# Patient Record
Sex: MALE | Race: BLACK OR AFRICAN AMERICAN | NOT HISPANIC OR LATINO | Employment: FULL TIME | URBAN - METROPOLITAN AREA
[De-identification: names, ages, dates, MRNs, and addresses within clinical notes are randomized per-mention and may not be internally consistent; named-entity substitution may affect disease eponyms.]

---

## 2021-05-02 ENCOUNTER — HOSPITAL ENCOUNTER (EMERGENCY)
Facility: HOSPITAL | Age: 56
Discharge: HOME | End: 2021-05-02
Attending: EMERGENCY MEDICINE

## 2021-05-02 VITALS
TEMPERATURE: 98 F | DIASTOLIC BLOOD PRESSURE: 83 MMHG | HEIGHT: 70 IN | SYSTOLIC BLOOD PRESSURE: 144 MMHG | WEIGHT: 189 LBS | OXYGEN SATURATION: 99 % | HEART RATE: 58 BPM | RESPIRATION RATE: 16 BRPM | BODY MASS INDEX: 27.06 KG/M2

## 2021-05-02 DIAGNOSIS — M10.9 ACUTE GOUT OF RIGHT FOOT, UNSPECIFIED CAUSE: Primary | ICD-10-CM

## 2021-05-02 LAB
CREAT SERPL-MCNC: 1.2 MG/DL (ref 0.5–1.4)
ERYTHROCYTE [SEDIMENTATION RATE] IN BLOOD BY WESTERGREN METHOD: 9 MM/HR (ref 0–10)
SAMPLE: NORMAL
URATE SERPL-MCNC: 8.8 MG/DL (ref 3.4–7)

## 2021-05-02 PROCEDURE — 85651 RBC SED RATE NONAUTOMATED: CPT | Performed by: EMERGENCY MEDICINE

## 2021-05-02 PROCEDURE — 99284 EMERGENCY DEPT VISIT MOD MDM: CPT | Mod: 25

## 2021-05-02 PROCEDURE — 36415 COLL VENOUS BLD VENIPUNCTURE: CPT | Performed by: EMERGENCY MEDICINE

## 2021-05-02 PROCEDURE — 84550 ASSAY OF BLOOD/URIC ACID: CPT | Performed by: EMERGENCY MEDICINE

## 2021-05-02 PROCEDURE — 63600175 PHARM REV CODE 636 W HCPCS: Performed by: STUDENT IN AN ORGANIZED HEALTH CARE EDUCATION/TRAINING PROGRAM

## 2021-05-02 PROCEDURE — 96372 THER/PROPH/DIAG INJ SC/IM: CPT

## 2021-05-02 RX ORDER — KETOROLAC TROMETHAMINE 30 MG/ML
30 INJECTION, SOLUTION INTRAMUSCULAR; INTRAVENOUS
Status: COMPLETED | OUTPATIENT
Start: 2021-05-02 | End: 2021-05-02

## 2021-05-02 RX ORDER — DEXAMETHASONE SODIUM PHOSPHATE 4 MG/ML
12 INJECTION, SOLUTION INTRA-ARTICULAR; INTRALESIONAL; INTRAMUSCULAR; INTRAVENOUS; SOFT TISSUE
Status: COMPLETED | OUTPATIENT
Start: 2021-05-02 | End: 2021-05-02

## 2021-05-02 RX ADMIN — DEXAMETHASONE SODIUM PHOSPHATE 12 MG: 4 INJECTION, SOLUTION INTRA-ARTICULAR; INTRALESIONAL; INTRAMUSCULAR; INTRAVENOUS; SOFT TISSUE at 05:05

## 2021-05-02 RX ADMIN — KETOROLAC TROMETHAMINE 30 MG: 30 INJECTION, SOLUTION INTRAMUSCULAR; INTRAVENOUS at 05:05

## 2021-05-05 ENCOUNTER — TELEPHONE (OUTPATIENT)
Dept: EMERGENCY MEDICINE | Facility: HOSPITAL | Age: 56
End: 2021-05-05

## 2021-05-05 RX ORDER — PREDNISONE 20 MG/1
40 TABLET ORAL DAILY
Qty: 10 TABLET | Refills: 0 | Status: SHIPPED | OUTPATIENT
Start: 2021-05-05 | End: 2021-05-10

## 2021-08-12 ENCOUNTER — TELEPHONE (OUTPATIENT)
Dept: FAMILY MEDICINE | Facility: CLINIC | Age: 56
End: 2021-08-12

## 2021-08-12 ENCOUNTER — CLINICAL SUPPORT (OUTPATIENT)
Dept: FAMILY MEDICINE | Facility: CLINIC | Age: 56
End: 2021-08-12
Payer: OTHER GOVERNMENT

## 2021-08-12 DIAGNOSIS — U07.1 COVID-19 VIRUS INFECTION: Primary | ICD-10-CM

## 2021-08-12 DIAGNOSIS — Z20.822 EXPOSURE TO COVID-19 VIRUS: Primary | ICD-10-CM

## 2021-08-12 LAB — SARS-COV-2 RDRP RESP QL NAA+PROBE: POSITIVE

## 2021-08-12 PROCEDURE — U0002 COVID-19 LAB TEST NON-CDC: HCPCS | Performed by: FAMILY MEDICINE

## 2021-08-16 ENCOUNTER — INFUSION (OUTPATIENT)
Dept: INFECTIOUS DISEASES | Facility: HOSPITAL | Age: 56
End: 2021-08-16
Attending: PHYSICIAN ASSISTANT

## 2021-08-16 VITALS
RESPIRATION RATE: 18 BRPM | OXYGEN SATURATION: 98 % | HEART RATE: 47 BPM | DIASTOLIC BLOOD PRESSURE: 94 MMHG | TEMPERATURE: 98 F | SYSTOLIC BLOOD PRESSURE: 160 MMHG

## 2021-08-16 DIAGNOSIS — U07.1 COVID-19 VIRUS INFECTION: Primary | ICD-10-CM

## 2021-08-16 PROCEDURE — 63600175 PHARM REV CODE 636 W HCPCS: Performed by: PHYSICIAN ASSISTANT

## 2021-08-16 PROCEDURE — M0243 CASIRIVI AND IMDEVI INFUSION: HCPCS | Performed by: PHYSICIAN ASSISTANT

## 2021-08-16 PROCEDURE — 25000003 PHARM REV CODE 250: Performed by: PHYSICIAN ASSISTANT

## 2021-08-16 RX ORDER — DIPHENHYDRAMINE HYDROCHLORIDE 50 MG/ML
25 INJECTION INTRAMUSCULAR; INTRAVENOUS ONCE AS NEEDED
Status: DISCONTINUED | OUTPATIENT
Start: 2021-08-16 | End: 2022-07-30

## 2021-08-16 RX ORDER — ACETAMINOPHEN 325 MG/1
650 TABLET ORAL ONCE AS NEEDED
Status: DISPENSED | OUTPATIENT
Start: 2021-08-16 | End: 2033-01-11

## 2021-08-16 RX ORDER — ALBUTEROL SULFATE 90 UG/1
2 AEROSOL, METERED RESPIRATORY (INHALATION)
Status: DISCONTINUED | OUTPATIENT
Start: 2021-08-16 | End: 2022-09-06

## 2021-08-16 RX ORDER — EPINEPHRINE 0.3 MG/.3ML
0.3 INJECTION SUBCUTANEOUS
Status: DISCONTINUED | OUTPATIENT
Start: 2021-08-16 | End: 2022-07-30

## 2021-08-16 RX ORDER — SODIUM CHLORIDE 0.9 % (FLUSH) 0.9 %
10 SYRINGE (ML) INJECTION
Status: DISCONTINUED | OUTPATIENT
Start: 2021-08-16 | End: 2022-07-30

## 2021-08-16 RX ORDER — ONDANSETRON 4 MG/1
4 TABLET, ORALLY DISINTEGRATING ORAL ONCE AS NEEDED
Status: DISCONTINUED | OUTPATIENT
Start: 2021-08-16 | End: 2022-07-30

## 2021-08-16 RX ADMIN — CASIRIVIMAB AND IMDEVIMAB 600 MG: 600; 600 INJECTION, SOLUTION, CONCENTRATE INTRAVENOUS at 08:08

## 2021-08-16 RX ADMIN — SODIUM CHLORIDE: 0.9 INJECTION, SOLUTION INTRAVENOUS at 08:08

## 2022-05-10 ENCOUNTER — HOSPITAL ENCOUNTER (EMERGENCY)
Facility: HOSPITAL | Age: 57
Discharge: HOME | End: 2022-05-10
Attending: EMERGENCY MEDICINE
Payer: COMMERCIAL

## 2022-05-10 VITALS
DIASTOLIC BLOOD PRESSURE: 104 MMHG | SYSTOLIC BLOOD PRESSURE: 163 MMHG | TEMPERATURE: 98 F | BODY MASS INDEX: 25.05 KG/M2 | RESPIRATION RATE: 16 BRPM | HEIGHT: 70 IN | HEART RATE: 68 BPM | WEIGHT: 175 LBS | OXYGEN SATURATION: 100 %

## 2022-05-10 DIAGNOSIS — M10.9 ACUTE GOUT OF RIGHT FOOT, UNSPECIFIED CAUSE: Primary | ICD-10-CM

## 2022-05-10 PROCEDURE — 25000003 PHARM REV CODE 250: Performed by: EMERGENCY MEDICINE

## 2022-05-10 PROCEDURE — 63600175 PHARM REV CODE 636 W HCPCS: Performed by: EMERGENCY MEDICINE

## 2022-05-10 PROCEDURE — 99284 EMERGENCY DEPT VISIT MOD MDM: CPT | Mod: 25

## 2022-05-10 PROCEDURE — 96372 THER/PROPH/DIAG INJ SC/IM: CPT | Performed by: EMERGENCY MEDICINE

## 2022-05-10 RX ORDER — METOPROLOL SUCCINATE 100 MG/1
100 TABLET, EXTENDED RELEASE ORAL
Status: ON HOLD | COMMUNITY
Start: 2022-04-04 | End: 2022-07-30 | Stop reason: SDUPTHER

## 2022-05-10 RX ORDER — LOSARTAN POTASSIUM 25 MG/1
25 TABLET ORAL
Status: ON HOLD | COMMUNITY
Start: 2022-04-04 | End: 2022-07-30 | Stop reason: SDUPTHER

## 2022-05-10 RX ORDER — KETOROLAC TROMETHAMINE 10 MG/1
10 TABLET, FILM COATED ORAL
Status: COMPLETED | OUTPATIENT
Start: 2022-05-10 | End: 2022-05-10

## 2022-05-10 RX ORDER — HYDROMORPHONE HYDROCHLORIDE 2 MG/ML
1 INJECTION, SOLUTION INTRAMUSCULAR; INTRAVENOUS; SUBCUTANEOUS
Status: COMPLETED | OUTPATIENT
Start: 2022-05-10 | End: 2022-05-10

## 2022-05-10 RX ORDER — PREDNISONE 20 MG/1
40 TABLET ORAL DAILY
Qty: 10 TABLET | Refills: 0 | Status: SHIPPED | OUTPATIENT
Start: 2022-05-10 | End: 2022-05-15

## 2022-05-10 RX ORDER — PREDNISONE 20 MG/1
40 TABLET ORAL
Status: COMPLETED | OUTPATIENT
Start: 2022-05-10 | End: 2022-05-10

## 2022-05-10 RX ORDER — HYDROCODONE BITARTRATE AND ACETAMINOPHEN 10; 325 MG/1; MG/1
1 TABLET ORAL EVERY 4 HOURS PRN
Qty: 12 TABLET | Refills: 0 | Status: ON HOLD | OUTPATIENT
Start: 2022-05-10 | End: 2022-07-30

## 2022-05-10 RX ADMIN — KETOROLAC TROMETHAMINE 10 MG: 10 TABLET, FILM COATED ORAL at 02:05

## 2022-05-10 RX ADMIN — HYDROMORPHONE HYDROCHLORIDE 1 MG: 2 INJECTION INTRAMUSCULAR; INTRAVENOUS; SUBCUTANEOUS at 02:05

## 2022-05-10 RX ADMIN — PREDNISONE 40 MG: 20 TABLET ORAL at 02:05

## 2022-05-10 NOTE — ED PROVIDER NOTES
Encounter Date: 5/10/2022       History     Chief Complaint   Patient presents with    Gout     To right foot since this morning     56-year-old male history of gout history of CABG presents to the ER with right foot pain consistent with prior gout.  He has had several episodes all affecting the right foot.  He estimates that this is his 4th episode.  Last episode was about a year ago and he went to [facility].  No other affected areas.        Review of patient's allergies indicates:  No Known Allergies  Past Medical History:   Diagnosis Date    Coronary artery disease     COVID-19     Gout     Hyperlipidemia     Hypertension      Past Surgical History:   Procedure Laterality Date    CORONARY ARTERY BYPASS GRAFT       No family history on file.  Social History     Tobacco Use    Smoking status: Former Smoker   Substance Use Topics    Alcohol use: Not Currently     Review of Systems   Constitutional: Negative for fever.   Musculoskeletal: Positive for arthralgias and gait problem.   Skin: Negative for wound.       Physical Exam     Initial Vitals [05/10/22 0156]   BP Pulse Resp Temp SpO2   (!) 156/93 60 18 98 °F (36.7 °C) 99 %      MAP       --         Physical Exam    Nursing note and vitals reviewed.  Constitutional: He appears well-developed and well-nourished. He is not diaphoretic.  Non-toxic appearance. He does not have a sickly appearance. He does not appear ill. No distress.   HENT:   Head: Normocephalic and atraumatic.   Eyes: EOM are normal.   Neck: Neck supple.   Normal range of motion.  Pulmonary/Chest: No respiratory distress.   Musculoskeletal:         General: Normal range of motion.      Cervical back: Normal range of motion and neck supple. No rigidity. Normal range of motion.      Right foot: Tenderness and bony tenderness present.        Legs:       Comments: Tenderness to the 1st metatarsal shaft of the right foot     Neurological: He is alert and oriented to person, place, and  time.   Skin: Skin is warm and dry. No rash noted.   Psychiatric: He has a normal mood and affect. His behavior is normal. Judgment and thought content normal.         ED Course   Procedures  Labs Reviewed - No data to display       Imaging Results    None          Medications   HYDROmorphone (PF) injection 1 mg (1 mg Intramuscular Given 5/10/22 0245)   ketorolac tablet 10 mg (10 mg Oral Given 5/10/22 0243)   predniSONE tablet 40 mg (40 mg Oral Given 5/10/22 0244)     Medical Decision Making:   History:   Old Medical Records: I decided to obtain old medical records.             ED Course as of 05/10/22 0338   Tue May 10, 2022   0204 BP(!): 156/93 [EF]   0204 Temp: 98 °F (36.7 °C) [EF]   0204 Temp src: Oral [EF]   0204 Pulse: 60 [EF]   0204 Resp: 18 [EF]   0204 SpO2: 99 % [EF]      ED Course User Index  [EF] Jamie Alba MD             Clinical Impression:   Final diagnoses:  [M10.9] Acute gout of right foot, unspecified cause (Primary)          ED Disposition Condition    Discharge Stable        ED Prescriptions     Medication Sig Dispense Start Date End Date Auth. Provider    predniSONE (DELTASONE) 20 MG tablet Take 2 tablets (40 mg total) by mouth once daily. for 5 days 10 tablet 5/10/2022 5/15/2022 Jamie Alba MD    HYDROcodone-acetaminophen (NORCO)  mg per tablet Take 1 tablet by mouth every 4 (four) hours as needed for Pain. 12 tablet 5/10/2022  Jamie Alba MD        Follow-up Information     Follow up With Specialties Details Why Contact [facility] Emergency Medicine  As needed, If symptoms worsen [address] [place] [id number] [phone]    [facility] Podiatry Schedule an appointment as soon as possible for a visit   [address] [place] [address] [address]  [facility] [id number] [phone]          56-year-old with history of gout presents to the emergency room with right foot pain reminiscent of previous gout episodes.  Pain  well controlled in the emergency room.  He is discharged with prednisone and Norco.  Follow up with Podiatry as an outpatient.     Jamie Alba MD  05/10/22 8218

## 2022-05-10 NOTE — ED NOTES
Pt present with complaints of pain in right foot along left lateral side. No noted swelling or redness. Pt reports eating seafood on mother's day that he believes has caused his gout in that foot to start hurting. Pain level is 9/10. Pt is alert and oriented with neuro intact.

## 2022-05-11 ENCOUNTER — HOSPITAL ENCOUNTER (EMERGENCY)
Facility: HOSPITAL | Age: 57
Discharge: HOME | End: 2022-05-11
Attending: EMERGENCY MEDICINE
Payer: COMMERCIAL

## 2022-05-11 VITALS
BODY MASS INDEX: 25.05 KG/M2 | WEIGHT: 175 LBS | OXYGEN SATURATION: 97 % | RESPIRATION RATE: 17 BRPM | SYSTOLIC BLOOD PRESSURE: 133 MMHG | HEIGHT: 70 IN | HEART RATE: 58 BPM | DIASTOLIC BLOOD PRESSURE: 75 MMHG | TEMPERATURE: 98 F

## 2022-05-11 DIAGNOSIS — M10.00 ACUTE IDIOPATHIC GOUT, UNSPECIFIED SITE: Primary | ICD-10-CM

## 2022-05-11 PROCEDURE — 99284 EMERGENCY DEPT VISIT MOD MDM: CPT | Mod: 25

## 2022-05-11 PROCEDURE — 63600175 PHARM REV CODE 636 W HCPCS: Performed by: EMERGENCY MEDICINE

## 2022-05-11 PROCEDURE — 96372 THER/PROPH/DIAG INJ SC/IM: CPT | Performed by: EMERGENCY MEDICINE

## 2022-05-11 PROCEDURE — 25000003 PHARM REV CODE 250: Performed by: EMERGENCY MEDICINE

## 2022-05-11 RX ORDER — KETOROLAC TROMETHAMINE 30 MG/ML
30 INJECTION, SOLUTION INTRAMUSCULAR; INTRAVENOUS
Status: COMPLETED | OUTPATIENT
Start: 2022-05-11 | End: 2022-05-11

## 2022-05-11 RX ORDER — COLCHICINE 0.6 MG/1
1.2 TABLET, FILM COATED ORAL
Status: COMPLETED | OUTPATIENT
Start: 2022-05-11 | End: 2022-05-11

## 2022-05-11 RX ORDER — COLCHICINE 0.6 MG/1
TABLET ORAL
Qty: 30 TABLET | Refills: 0 | Status: ON HOLD | OUTPATIENT
Start: 2022-05-11 | End: 2022-07-30

## 2022-05-11 RX ORDER — INDOMETHACIN 50 MG/1
50 CAPSULE ORAL 3 TIMES DAILY PRN
Qty: 15 CAPSULE | Refills: 0 | Status: ON HOLD | OUTPATIENT
Start: 2022-05-11 | End: 2022-07-30

## 2022-05-11 RX ADMIN — COLCHICINE 1.2 MG: 0.6 TABLET, FILM COATED ORAL at 06:05

## 2022-05-11 RX ADMIN — KETOROLAC TROMETHAMINE 30 MG: 30 INJECTION, SOLUTION INTRAMUSCULAR at 06:05

## 2022-05-11 NOTE — ED NOTES
Pt identifiers checked and accurate with Irwin Richard    Pt presents to ED with complaints of foot pain x 2 days with hx of gout. Pt denies all other complaints at this time.

## 2022-05-12 NOTE — ED PROVIDER NOTES
"Encounter Date: 5/11/2022       History     Chief Complaint   Patient presents with    Gout     States dx with gout to R foot x 2 days ago -- has appt with podiatrist but "can't wait that long"     Patient is a 56-year-old male who presents emergency room with right foot pain that has been present for the past 2 days.  He is currently taking Norco and prednisone.  He has not been taking anti-inflammatories or colchicine.  He is not on allopurinol.  No other complaints at this time.  No trauma no fevers.        Review of patient's allergies indicates:  No Known Allergies  Past Medical History:   Diagnosis Date    Coronary artery disease     COVID-19     Gout     Hyperlipidemia     Hypertension      Past Surgical History:   Procedure Laterality Date    CORONARY ARTERY BYPASS GRAFT       History reviewed. No pertinent family history.  Social History     Tobacco Use    Smoking status: Former Smoker   Substance Use Topics    Alcohol use: Not Currently     Review of Systems   Constitutional: Negative for fever.   Musculoskeletal: Positive for arthralgias.   Skin: Negative for wound.   Neurological: Negative for weakness and numbness.       Physical Exam     Initial Vitals [05/11/22 1756]   BP Pulse Resp Temp SpO2   133/75 (!) 58 17 97.9 °F (36.6 °C) 97 %      MAP       --         Physical Exam    Nursing note and vitals reviewed.  Constitutional: He appears well-developed and well-nourished.   Cardiovascular: Normal rate, regular rhythm, normal heart sounds and intact distal pulses.   Abdominal: There is no abdominal tenderness.   Musculoskeletal:         General: Tenderness (Tenderness over the right great MTP) present.     Neurological: He is alert and oriented to person, place, and time. He has normal strength. No sensory deficit.         ED Course   Procedures  Labs Reviewed - No data to display       Imaging Results    None          Medications   colchicine capsule/tablet 1.2 mg (1.2 mg Oral Given 5/11/22 " 1830)   ketorolac injection 30 mg (30 mg Intramuscular Given 5/11/22 1820)     Medical Decision Making:   Patient has gout.  Stable for discharge at this time                      Clinical Impression:   Final diagnoses:  [M10.00] Acute idiopathic gout, unspecified site (Primary)          ED Disposition Condition    Discharge Stable        ED Prescriptions     Medication Sig Dispense Start Date End Date Auth. Provider    colchicine (COLCRYS) 0.6 mg tablet Take 2 tablets by mouth.  If the pain is not improving take another tablet 1 hour later.  Do not take more than 3 tablets in 1 day. 30 tablet 5/11/2022  Shubham Lu MD    indomethacin (INDOCIN) 50 MG capsule Take 1 capsule (50 mg total) by mouth 3 (three) times daily as needed. 15 capsule 5/11/2022  Shubham Lu MD        Follow-up Information     Follow up With Specialties Details Why Contact Info        Follow-up with the podiatrist           Shubham Lu MD  05/11/22 0056

## 2022-05-24 ENCOUNTER — HOSPITAL ENCOUNTER (EMERGENCY)
Facility: HOSPITAL | Age: 57
Discharge: HOME | End: 2022-05-24
Attending: EMERGENCY MEDICINE
Payer: COMMERCIAL

## 2022-05-24 VITALS
HEART RATE: 83 BPM | SYSTOLIC BLOOD PRESSURE: 113 MMHG | OXYGEN SATURATION: 96 % | DIASTOLIC BLOOD PRESSURE: 80 MMHG | RESPIRATION RATE: 18 BRPM | BODY MASS INDEX: 25.05 KG/M2 | WEIGHT: 175 LBS | HEIGHT: 70 IN | TEMPERATURE: 98 F

## 2022-05-24 DIAGNOSIS — K04.7 DENTAL ABSCESS: Primary | ICD-10-CM

## 2022-05-24 PROCEDURE — 99284 EMERGENCY DEPT VISIT MOD MDM: CPT

## 2022-05-24 PROCEDURE — 25000003 PHARM REV CODE 250: Performed by: EMERGENCY MEDICINE

## 2022-05-24 RX ORDER — AMOXICILLIN AND CLAVULANATE POTASSIUM 875; 125 MG/1; MG/1
1 TABLET, FILM COATED ORAL
Status: COMPLETED | OUTPATIENT
Start: 2022-05-24 | End: 2022-05-24

## 2022-05-24 RX ORDER — AMOXICILLIN AND CLAVULANATE POTASSIUM 875; 125 MG/1; MG/1
1 TABLET, FILM COATED ORAL 2 TIMES DAILY
Qty: 14 TABLET | Refills: 0 | Status: ON HOLD | OUTPATIENT
Start: 2022-05-24 | End: 2022-07-30

## 2022-05-24 RX ORDER — IBUPROFEN 600 MG/1
600 TABLET ORAL EVERY 6 HOURS PRN
Qty: 20 TABLET | Refills: 0 | Status: SHIPPED | OUTPATIENT
Start: 2022-05-24 | End: 2022-12-08

## 2022-05-24 RX ORDER — IBUPROFEN 600 MG/1
600 TABLET ORAL
Status: COMPLETED | OUTPATIENT
Start: 2022-05-24 | End: 2022-05-24

## 2022-05-24 RX ADMIN — AMOXICILLIN AND CLAVULANATE POTASSIUM 1 TABLET: 875; 125 TABLET, FILM COATED ORAL at 10:05

## 2022-05-24 RX ADMIN — IBUPROFEN 600 MG: 600 TABLET ORAL at 10:05

## 2022-05-24 NOTE — ED PROVIDER NOTES
Encounter Date: 5/24/2022    SCRIBE #1 NOTE: I, Emily Snyder, ayana scribing for, and in the presence of, Kareem Galaviz III, MD.       History     Chief Complaint   Patient presents with    Dental Problem     Time seen by provider: 10:41 AM on 05/24/2022    Irwin Richard is a 56 y.o. male who presents to the ED for dental problem. Patient reports right lower tooth fracture 2 days ago with associated pain. He went to [facility] this morning and states he was advised to see a medical provider first prompting ED visit today. The patient denies any other symptoms at this time. PMHx of HTN, CAD, and HLD. PSHx of CABG.    The history is provided by the patient.     Review of patient's allergies indicates:  No Known Allergies  Past Medical History:   Diagnosis Date    Coronary artery disease     COVID-19     Gout     Hyperlipidemia     Hypertension      Past Surgical History:   Procedure Laterality Date    CORONARY ARTERY BYPASS GRAFT       No family history on file.  Social History     Tobacco Use    Smoking status: Former Smoker   Substance Use Topics    Alcohol use: Not Currently     Review of Systems   Constitutional: Negative for fever.   HENT: Positive for dental problem.    Respiratory: Negative for shortness of breath.    Genitourinary: Negative for flank pain.   Musculoskeletal: Negative for gait problem.   Neurological: Negative for weakness.   Psychiatric/Behavioral: Negative for confusion.       Physical Exam     Initial Vitals [05/24/22 1036]   BP Pulse Resp Temp SpO2   113/80 83 18 98 °F (36.7 °C) 96 %      MAP       --         Physical Exam    Nursing note and vitals reviewed.  Constitutional: He appears well-developed and well-nourished.   HENT:   Head: Normocephalic and atraumatic.   Mouth/Throat: Uvula is midline and oropharynx is clear and moist. No trismus in the jaw. Dental caries present. No oropharyngeal exudate, posterior oropharyngeal edema or posterior oropharyngeal erythema.    Large dental gómez. No gingival swelling. Right mandibular swelling with associated tenderness.    Eyes: Conjunctivae are normal.   Neck: Neck supple. No stridor present.   Normal range of motion.  Cardiovascular: Normal rate, regular rhythm and normal heart sounds. Exam reveals no gallop and no friction rub.    No murmur heard.  Pulmonary/Chest: Breath sounds normal. No respiratory distress. He has no wheezes. He has no rhonchi. He has no rales.   Musculoskeletal:         General: Normal range of motion.      Cervical back: Normal range of motion and neck supple.     Neurological: He is alert and oriented to person, place, and time.   Skin: Skin is warm and dry.   Psychiatric: He has a normal mood and affect.         ED Course   Procedures  Labs Reviewed - No data to display       Imaging Results    None          Medications   amoxicillin-clavulanate 875-125mg per tablet 1 tablet (1 tablet Oral Given 5/24/22 1050)   ibuprofen tablet 600 mg (600 mg Oral Given 5/24/22 1050)     Medical Decision Making:   History:   Old Medical Records: I decided to obtain old medical records.  ED Management:  56-year-old male presents with dental pain with mandibular swelling.  There is no visible drainable abscess.  The symptoms suggest a dental abscess.  He is placed on Augmentin and referred to a dentist.       APC / Resident Notes:   I, Dr. Kareem Galaviz III, personally performed the services described in this documentation. All medical record entries made by the scribe were at my direction and in my presence.  I have reviewed the chart and agree that the record reflects my personal performance and is accurate and complete     Scribe Attestation:   Scribe #1: I performed the above scribed service and the documentation accurately describes the services I performed. I attest to the accuracy of the note.                 Clinical Impression:   Final diagnoses:  [K04.7] Dental abscess (Primary)          ED Disposition Condition     Discharge Stable        ED Prescriptions     Medication Sig Dispense Start Date End Date Auth. Provider    amoxicillin-clavulanate 875-125mg (AUGMENTIN) 875-125 mg per tablet Take 1 tablet by mouth 2 (two) times daily. 14 tablet 5/24/2022  Kareem Galaviz III, MD    ibuprofen (ADVIL,MOTRIN) 600 MG tablet Take 1 tablet (600 mg total) by mouth every 6 (six) hours as needed for Pain. 20 tablet 5/24/2022  Kareem Galaviz III, MD        Follow-up Information     Follow up With Specialties Details Why Contact Info    [facility]  In 1 day  [facility]  [address] [facility] ANGELA Manning III, MD  05/24/22 5399

## 2022-05-24 NOTE — ED NOTES
Assumed care:  Irwin Richard is awake, alert and oriented x 3, skin warm and dry, in NAD.  Patient CO left lower tooth pain.    Patient identifiers for Irwin Richard checked and correct.  LOC:  Irwin Richard is awake, alert, and aware of environment with an appropriate affect. He is oriented x 3 and speaking appropriately.  APPEARANCE:  He is resting comfortably and in no acute distress. He is clean and well groomed, patient's clothing is properly fastened.  SKIN:  The skin is warm and dry. He has normal skin turgor and moist mucus membranes. Skin is intact; no bruising or breakdown noted.  MUSCULOSKELETAL:  He is moving all extremities well, no obvious deformities noted. Pulses intact.   RESPIRATORY:  Airway is open and patent. Respirations are spontaneous and non-labored with normal effort and rate.  CARDIAC:  He has a normal rate and rhythm. No peripheral edema noted. Capillary refill < 3 seconds.  ABDOMEN:  No distention noted.  Soft and non-tender upon palpation.  NEUROLOGICAL:  PERRL. Facial expression is symmetrical. Hand grasps are equal bilaterally. Normal sensation in all extremities when touched with finger.  Allergies reported:  Review of patient's allergies indicates:  No Known Allergies  OTHER NOTES:

## 2022-07-28 ENCOUNTER — HOSPITAL ENCOUNTER (INPATIENT)
Facility: HOSPITAL | Age: 57
LOS: 1 days | Discharge: HOME | DRG: 554 | End: 2022-07-30
Attending: EMERGENCY MEDICINE | Admitting: INTERNAL MEDICINE
Payer: COMMERCIAL

## 2022-07-28 DIAGNOSIS — R07.9 CHEST PAIN: ICD-10-CM

## 2022-07-28 DIAGNOSIS — I50.9 NEW ONSET OF CONGESTIVE HEART FAILURE: ICD-10-CM

## 2022-07-28 DIAGNOSIS — R07.89 CHEST TIGHTNESS: ICD-10-CM

## 2022-07-28 DIAGNOSIS — M1A.0710 IDIOPATHIC CHRONIC GOUT OF RIGHT FOOT WITHOUT TOPHUS: Primary | ICD-10-CM

## 2022-07-28 PROBLEM — Z95.1 HISTORY OF CORONARY ARTERY BYPASS GRAFT: Status: ACTIVE | Noted: 2020-01-15

## 2022-07-28 PROBLEM — I25.10 CAD (CORONARY ARTERY DISEASE): Status: ACTIVE | Noted: 2022-07-28

## 2022-07-28 PROBLEM — G89.29 CHRONIC BILATERAL LOW BACK PAIN WITHOUT SCIATICA: Status: ACTIVE | Noted: 2020-01-15

## 2022-07-28 PROBLEM — M10.9 GOUT OF FOOT: Status: ACTIVE | Noted: 2021-04-05

## 2022-07-28 PROBLEM — M54.50 CHRONIC BILATERAL LOW BACK PAIN WITHOUT SCIATICA: Status: ACTIVE | Noted: 2020-01-15

## 2022-07-28 PROBLEM — E78.5 HLD (HYPERLIPIDEMIA): Status: ACTIVE | Noted: 2022-07-28

## 2022-07-28 PROBLEM — I10 HTN (HYPERTENSION): Status: ACTIVE | Noted: 2022-07-28

## 2022-07-28 LAB
ALBUMIN SERPL BCP-MCNC: 3.9 G/DL (ref 3.5–5.2)
ALP SERPL-CCNC: 73 U/L (ref 55–135)
ALT SERPL W/O P-5'-P-CCNC: 12 U/L (ref 10–44)
ANION GAP SERPL CALC-SCNC: 9 MMOL/L (ref 8–16)
AST SERPL-CCNC: 15 U/L (ref 10–40)
BASOPHILS # BLD AUTO: 0.02 K/UL (ref 0–0.2)
BASOPHILS NFR BLD: 0.3 % (ref 0–1.9)
BILIRUB SERPL-MCNC: 0.4 MG/DL (ref 0.1–1)
BNP SERPL-MCNC: 126 PG/ML (ref 0–99)
BUN SERPL-MCNC: 12 MG/DL (ref 6–20)
CALCIUM SERPL-MCNC: 9.3 MG/DL (ref 8.7–10.5)
CHLORIDE SERPL-SCNC: 107 MMOL/L (ref 95–110)
CO2 SERPL-SCNC: 26 MMOL/L (ref 23–29)
CREAT SERPL-MCNC: 1 MG/DL (ref 0.5–1.4)
DIFFERENTIAL METHOD: ABNORMAL
EOSINOPHIL # BLD AUTO: 0.2 K/UL (ref 0–0.5)
EOSINOPHIL NFR BLD: 2.6 % (ref 0–8)
ERYTHROCYTE [DISTWIDTH] IN BLOOD BY AUTOMATED COUNT: 11.8 % (ref 11.5–14.5)
EST. GFR  (AFRICAN AMERICAN): >60 ML/MIN/1.73 M^2
EST. GFR  (NON AFRICAN AMERICAN): >60 ML/MIN/1.73 M^2
GLUCOSE SERPL-MCNC: 74 MG/DL (ref 70–110)
HCT VFR BLD AUTO: 40.5 % (ref 40–54)
HGB BLD-MCNC: 13.5 G/DL (ref 14–18)
IMM GRANULOCYTES # BLD AUTO: 0.03 K/UL (ref 0–0.04)
IMM GRANULOCYTES NFR BLD AUTO: 0.4 % (ref 0–0.5)
LYMPHOCYTES # BLD AUTO: 3.4 K/UL (ref 1–4.8)
LYMPHOCYTES NFR BLD: 42.2 % (ref 18–48)
MCH RBC QN AUTO: 30.5 PG (ref 27–31)
MCHC RBC AUTO-ENTMCNC: 33.3 G/DL (ref 32–36)
MCV RBC AUTO: 91 FL (ref 82–98)
MONOCYTES # BLD AUTO: 0.7 K/UL (ref 0.3–1)
MONOCYTES NFR BLD: 9.2 % (ref 4–15)
NEUTROPHILS # BLD AUTO: 3.6 K/UL (ref 1.8–7.7)
NEUTROPHILS NFR BLD: 45.3 % (ref 38–73)
NRBC BLD-RTO: 0 /100 WBC
PLATELET # BLD AUTO: 168 K/UL (ref 150–450)
PMV BLD AUTO: 10.6 FL (ref 9.2–12.9)
POTASSIUM SERPL-SCNC: 3.7 MMOL/L (ref 3.5–5.1)
PROT SERPL-MCNC: 7.4 G/DL (ref 6–8.4)
RBC # BLD AUTO: 4.43 M/UL (ref 4.6–6.2)
SODIUM SERPL-SCNC: 142 MMOL/L (ref 136–145)
TROPONIN I SERPL DL<=0.01 NG/ML-MCNC: <0.006 NG/ML (ref 0–0.03)
WBC # BLD AUTO: 7.96 K/UL (ref 3.9–12.7)

## 2022-07-28 PROCEDURE — 93010 ELECTROCARDIOGRAM REPORT: CPT | Mod: ,,, | Performed by: SPECIALIST

## 2022-07-28 PROCEDURE — 93005 ELECTROCARDIOGRAM TRACING: CPT

## 2022-07-28 PROCEDURE — 96374 THER/PROPH/DIAG INJ IV PUSH: CPT

## 2022-07-28 PROCEDURE — 96376 TX/PRO/DX INJ SAME DRUG ADON: CPT

## 2022-07-28 PROCEDURE — 25000003 PHARM REV CODE 250: Performed by: EMERGENCY MEDICINE

## 2022-07-28 PROCEDURE — 96361 HYDRATE IV INFUSION ADD-ON: CPT

## 2022-07-28 PROCEDURE — 93010 EKG 12-LEAD: ICD-10-PCS | Mod: ,,, | Performed by: SPECIALIST

## 2022-07-28 PROCEDURE — 36415 COLL VENOUS BLD VENIPUNCTURE: CPT | Performed by: EMERGENCY MEDICINE

## 2022-07-28 PROCEDURE — 83880 ASSAY OF NATRIURETIC PEPTIDE: CPT | Performed by: EMERGENCY MEDICINE

## 2022-07-28 PROCEDURE — 85025 COMPLETE CBC W/AUTO DIFF WBC: CPT | Performed by: EMERGENCY MEDICINE

## 2022-07-28 PROCEDURE — 96375 TX/PRO/DX INJ NEW DRUG ADDON: CPT

## 2022-07-28 PROCEDURE — 80053 COMPREHEN METABOLIC PANEL: CPT | Performed by: EMERGENCY MEDICINE

## 2022-07-28 PROCEDURE — 99285 EMERGENCY DEPT VISIT HI MDM: CPT | Mod: 25

## 2022-07-28 PROCEDURE — 84484 ASSAY OF TROPONIN QUANT: CPT | Mod: 91 | Performed by: EMERGENCY MEDICINE

## 2022-07-28 PROCEDURE — 63600175 PHARM REV CODE 636 W HCPCS: Performed by: EMERGENCY MEDICINE

## 2022-07-28 RX ORDER — MORPHINE SULFATE 4 MG/ML
8 INJECTION, SOLUTION INTRAMUSCULAR; INTRAVENOUS
Status: COMPLETED | OUTPATIENT
Start: 2022-07-28 | End: 2022-07-28

## 2022-07-28 RX ORDER — KETOROLAC TROMETHAMINE 30 MG/ML
15 INJECTION, SOLUTION INTRAMUSCULAR; INTRAVENOUS
Status: COMPLETED | OUTPATIENT
Start: 2022-07-28 | End: 2022-07-28

## 2022-07-28 RX ORDER — ASPIRIN 325 MG
325 TABLET ORAL
Status: COMPLETED | OUTPATIENT
Start: 2022-07-28 | End: 2022-07-28

## 2022-07-28 RX ORDER — LOSARTAN POTASSIUM 25 MG/1
25 TABLET ORAL DAILY
Status: ON HOLD | COMMUNITY
Start: 2022-05-25 | End: 2022-07-30 | Stop reason: SDUPTHER

## 2022-07-28 RX ORDER — METOPROLOL SUCCINATE 100 MG/1
100 TABLET, EXTENDED RELEASE ORAL DAILY
Status: ON HOLD | COMMUNITY
Start: 2022-05-25 | End: 2022-07-30 | Stop reason: SDUPTHER

## 2022-07-28 RX ADMIN — MORPHINE SULFATE 8 MG: 4 INJECTION INTRAVENOUS at 08:07

## 2022-07-28 RX ADMIN — KETOROLAC TROMETHAMINE 15 MG: 30 INJECTION, SOLUTION INTRAMUSCULAR; INTRAVENOUS at 11:07

## 2022-07-28 RX ADMIN — MORPHINE SULFATE 8 MG: 4 INJECTION INTRAVENOUS at 11:07

## 2022-07-28 RX ADMIN — ASPIRIN 325 MG ORAL TABLET 325 MG: 325 PILL ORAL at 08:07

## 2022-07-28 RX ADMIN — SODIUM CHLORIDE 500 ML: 0.9 INJECTION, SOLUTION INTRAVENOUS at 08:07

## 2022-07-28 NOTE — Clinical Note
Diagnosis: New onset of congestive heart failure [[id number]]   Future Attending Provider: NAMAN MATA [[id number]]   Admitting Provider:: NAMAN MATA [[id number]]   Special Needs:: No Special Needs [1]

## 2022-07-29 PROBLEM — I50.9 NEW ONSET OF CONGESTIVE HEART FAILURE: Status: ACTIVE | Noted: 2022-07-29

## 2022-07-29 LAB
AORTIC ROOT ANNULUS: 2.68 CM
AORTIC VALVE CUSP SEPERATION: 2.08 CM
ASCENDING AORTA: 2.55 CM
AV INDEX (PROSTH): 0.87
AV MEAN GRADIENT: 2 MMHG
AV PEAK GRADIENT: 4 MMHG
AV VALVE AREA: 3.01 CM2
AV VELOCITY RATIO: 0.81
BASOPHILS # BLD AUTO: 0.04 K/UL (ref 0–0.2)
BASOPHILS NFR BLD: 0.6 % (ref 0–1.9)
BSA FOR ECHO PROCEDURE: 2.01 M2
CHOLEST SERPL-MCNC: 202 MG/DL (ref 120–199)
CHOLEST/HDLC SERPL: 3.9 {RATIO} (ref 2–5)
CV ECHO LV RWT: 0.44 CM
CV STRESS BASE HR: 60 BPM
DIASTOLIC BLOOD PRESSURE: 76 MMHG
DIFFERENTIAL METHOD: ABNORMAL
DOP CALC AO PEAK VEL: 0.97 M/S
DOP CALC AO VTI: 16.47 CM
DOP CALC LVOT AREA: 3.5 CM2
DOP CALC LVOT DIAMETER: 2.1 CM
DOP CALC LVOT PEAK VEL: 0.79 M/S
DOP CALC LVOT STROKE VOLUME: 49.57 CM3
DOP CALC MV VTI: 20.93 CM
DOP CALCLVOT PEAK VEL VTI: 14.32 CM
E WAVE DECELERATION TIME: 301.53 MSEC
E/A RATIO: 0.5
E/E' RATIO: 4.4 M/S
ECHO LV POSTERIOR WALL: 0.95 CM (ref 0.6–1.1)
EJECTION FRACTION: 50 %
EOSINOPHIL # BLD AUTO: 0.3 K/UL (ref 0–0.5)
EOSINOPHIL NFR BLD: 3.7 % (ref 0–8)
ERYTHROCYTE [DISTWIDTH] IN BLOOD BY AUTOMATED COUNT: 11.7 % (ref 11.5–14.5)
ESTIMATED AVG GLUCOSE: 117 MG/DL (ref 68–131)
FRACTIONAL SHORTENING: 25 % (ref 28–44)
HBA1C MFR BLD: 5.7 % (ref 4–5.6)
HCT VFR BLD AUTO: 41.7 % (ref 40–54)
HDLC SERPL-MCNC: 52 MG/DL (ref 40–75)
HDLC SERPL: 25.7 % (ref 20–50)
HGB BLD-MCNC: 14 G/DL (ref 14–18)
IMM GRANULOCYTES # BLD AUTO: 0.02 K/UL (ref 0–0.04)
IMM GRANULOCYTES NFR BLD AUTO: 0.3 % (ref 0–0.5)
INTERVENTRICULAR SEPTUM: 1 CM (ref 0.6–1.1)
IVRT: 114.18 MSEC
LA MAJOR: 4.23 CM
LA MINOR: 3.91 CM
LA WIDTH: 2.82 CM
LDLC SERPL CALC-MCNC: 134.6 MG/DL (ref 63–159)
LEFT ATRIUM SIZE: 3.26 CM
LEFT ATRIUM VOLUME INDEX MOD: 14 ML/M2
LEFT ATRIUM VOLUME INDEX: 15.9 ML/M2
LEFT ATRIUM VOLUME MOD: 28.09 CM3
LEFT ATRIUM VOLUME: 31.75 CM3
LEFT INTERNAL DIMENSION IN SYSTOLE: 3.2 CM (ref 2.1–4)
LEFT VENTRICLE DIASTOLIC VOLUME INDEX: 40.91 ML/M2
LEFT VENTRICLE DIASTOLIC VOLUME: 81.82 ML
LEFT VENTRICLE MASS INDEX: 68 G/M2
LEFT VENTRICLE SYSTOLIC VOLUME INDEX: 20.5 ML/M2
LEFT VENTRICLE SYSTOLIC VOLUME: 40.96 ML
LEFT VENTRICULAR INTERNAL DIMENSION IN DIASTOLE: 4.27 CM (ref 3.5–6)
LEFT VENTRICULAR MASS: 136.04 G
LV LATERAL E/E' RATIO: 3.67 M/S
LV SEPTAL E/E' RATIO: 5.5 M/S
LYMPHOCYTES # BLD AUTO: 2.9 K/UL (ref 1–4.8)
LYMPHOCYTES NFR BLD: 40.1 % (ref 18–48)
MAGNESIUM SERPL-MCNC: 2.1 MG/DL (ref 1.6–2.6)
MCH RBC QN AUTO: 30.6 PG (ref 27–31)
MCHC RBC AUTO-ENTMCNC: 33.6 G/DL (ref 32–36)
MCV RBC AUTO: 91 FL (ref 82–98)
MONOCYTES # BLD AUTO: 0.7 K/UL (ref 0.3–1)
MONOCYTES NFR BLD: 9.2 % (ref 4–15)
MV A" WAVE DURATION": 10.28 MSEC
MV MEAN GRADIENT: 0 MMHG
MV PEAK A VEL: 0.66 M/S
MV PEAK E VEL: 0.33 M/S
MV PEAK GRADIENT: 3 MMHG
MV STENOSIS PRESSURE HALF TIME: 87.44 MS
MV VALVE AREA BY CONTINUITY EQUATION: 2.37 CM2
MV VALVE AREA P 1/2 METHOD: 2.52 CM2
NEUTROPHILS # BLD AUTO: 3.3 K/UL (ref 1.8–7.7)
NEUTROPHILS NFR BLD: 46.1 % (ref 38–73)
NONHDLC SERPL-MCNC: 150 MG/DL
NRBC BLD-RTO: 0 /100 WBC
OHS CV CPX 85 PERCENT MAX PREDICTED HEART RATE MALE: 139
OHS CV CPX MAX PREDICTED HEART RATE: 164
OHS CV CPX PATIENT IS FEMALE: 0
OHS CV CPX PATIENT IS MALE: 1
OHS CV CPX PEAK DIASTOLIC BLOOD PRESSURE: 94 MMHG
OHS CV CPX PEAK HEAR RATE: 83 BPM
OHS CV CPX PEAK RATE PRESSURE PRODUCT: NORMAL
OHS CV CPX PEAK SYSTOLIC BLOOD PRESSURE: 135 MMHG
OHS CV CPX PERCENT MAX PREDICTED HEART RATE ACHIEVED: 51
OHS CV CPX RATE PRESSURE PRODUCT PRESENTING: 6660
PHOSPHATE SERPL-MCNC: 3.5 MG/DL (ref 2.7–4.5)
PISA MRMAX VEL: 0.03 M/S
PISA TR MAX VEL: 2.28 M/S
PLATELET # BLD AUTO: 155 K/UL (ref 150–450)
PMV BLD AUTO: 10 FL (ref 9.2–12.9)
PULM VEIN S/D RATIO: 0.81
PV PEAK D VEL: 0.43 M/S
PV PEAK S VEL: 0.35 M/S
PV PEAK VELOCITY: 0.66 CM/S
RA MAJOR: 3.35 CM
RA PRESSURE: 3 MMHG
RA WIDTH: 2.17 CM
RBC # BLD AUTO: 4.58 M/UL (ref 4.6–6.2)
RIGHT VENTRICULAR END-DIASTOLIC DIMENSION: 2.62 CM
RV TISSUE DOPPLER FREE WALL SYSTOLIC VELOCITY 1 (APICAL 4 CHAMBER VIEW): 8.8 CM/S
SINUS: 2.62 CM
STJ: 2.51 CM
SYSTOLIC BLOOD PRESSURE: 111 MMHG
TDI LATERAL: 0.09 M/S
TDI SEPTAL: 0.06 M/S
TDI: 0.08 M/S
TR MAX PG: 21 MMHG
TRICUSPID ANNULAR PLANE SYSTOLIC EXCURSION: 1.99 CM
TRIGL SERPL-MCNC: 77 MG/DL (ref 30–150)
TROPONIN I SERPL DL<=0.01 NG/ML-MCNC: 0.01 NG/ML (ref 0–0.03)
TROPONIN I SERPL DL<=0.01 NG/ML-MCNC: <0.006 NG/ML (ref 0–0.03)
TSH SERPL DL<=0.005 MIU/L-ACNC: 3.81 UIU/ML (ref 0.4–4)
TV REST PULMONARY ARTERY PRESSURE: 24 MMHG
URATE SERPL-MCNC: 11.3 MG/DL (ref 3.4–7)
WBC # BLD AUTO: 7.1 K/UL (ref 3.9–12.7)

## 2022-07-29 PROCEDURE — 85025 COMPLETE CBC W/AUTO DIFF WBC: CPT | Performed by: NURSE PRACTITIONER

## 2022-07-29 PROCEDURE — 25000003 PHARM REV CODE 250: Performed by: NURSE PRACTITIONER

## 2022-07-29 PROCEDURE — 99223 1ST HOSP IP/OBS HIGH 75: CPT | Mod: 25,,, | Performed by: SPECIALIST

## 2022-07-29 PROCEDURE — 99223 PR INITIAL HOSPITAL CARE,LEVL III: ICD-10-PCS | Mod: 25,,, | Performed by: SPECIALIST

## 2022-07-29 PROCEDURE — 83735 ASSAY OF MAGNESIUM: CPT | Performed by: NURSE PRACTITIONER

## 2022-07-29 PROCEDURE — 63600175 PHARM REV CODE 636 W HCPCS: Performed by: EMERGENCY MEDICINE

## 2022-07-29 PROCEDURE — 63600175 PHARM REV CODE 636 W HCPCS: Performed by: NURSE PRACTITIONER

## 2022-07-29 PROCEDURE — 84100 ASSAY OF PHOSPHORUS: CPT | Performed by: NURSE PRACTITIONER

## 2022-07-29 PROCEDURE — 96375 TX/PRO/DX INJ NEW DRUG ADDON: CPT

## 2022-07-29 PROCEDURE — 36415 COLL VENOUS BLD VENIPUNCTURE: CPT | Performed by: SPECIALIST

## 2022-07-29 PROCEDURE — 63600175 PHARM REV CODE 636 W HCPCS: Performed by: SPECIALIST

## 2022-07-29 PROCEDURE — 36415 COLL VENOUS BLD VENIPUNCTURE: CPT | Performed by: NURSE PRACTITIONER

## 2022-07-29 PROCEDURE — 83036 HEMOGLOBIN GLYCOSYLATED A1C: CPT | Performed by: NURSE PRACTITIONER

## 2022-07-29 PROCEDURE — 84484 ASSAY OF TROPONIN QUANT: CPT | Performed by: NURSE PRACTITIONER

## 2022-07-29 PROCEDURE — 84443 ASSAY THYROID STIM HORMONE: CPT | Performed by: NURSE PRACTITIONER

## 2022-07-29 PROCEDURE — 84550 ASSAY OF BLOOD/URIC ACID: CPT | Performed by: SPECIALIST

## 2022-07-29 PROCEDURE — 80061 LIPID PANEL: CPT | Performed by: NURSE PRACTITIONER

## 2022-07-29 PROCEDURE — 12000002 HC ACUTE/MED SURGE SEMI-PRIVATE ROOM

## 2022-07-29 PROCEDURE — 96376 TX/PRO/DX INJ SAME DRUG ADON: CPT

## 2022-07-29 RX ORDER — FUROSEMIDE 10 MG/ML
60 INJECTION INTRAMUSCULAR; INTRAVENOUS
Status: COMPLETED | OUTPATIENT
Start: 2022-07-29 | End: 2022-07-29

## 2022-07-29 RX ORDER — ATORVASTATIN CALCIUM 40 MG/1
40 TABLET, FILM COATED ORAL DAILY
Status: DISCONTINUED | OUTPATIENT
Start: 2022-07-29 | End: 2022-07-30 | Stop reason: HOSPADM

## 2022-07-29 RX ORDER — SODIUM CHLORIDE 0.9 % (FLUSH) 0.9 %
10 SYRINGE (ML) INJECTION
Status: DISCONTINUED | OUTPATIENT
Start: 2022-07-29 | End: 2022-07-30 | Stop reason: HOSPADM

## 2022-07-29 RX ORDER — MORPHINE SULFATE 2 MG/ML
6 INJECTION, SOLUTION INTRAMUSCULAR; INTRAVENOUS EVERY 4 HOURS PRN
Status: DISCONTINUED | OUTPATIENT
Start: 2022-07-29 | End: 2022-07-30

## 2022-07-29 RX ORDER — COLCHICINE 0.6 MG/1
0.6 TABLET, FILM COATED ORAL DAILY
Status: DISCONTINUED | OUTPATIENT
Start: 2022-07-30 | End: 2022-07-30 | Stop reason: HOSPADM

## 2022-07-29 RX ORDER — COLCHICINE 0.6 MG/1
1.2 TABLET, FILM COATED ORAL ONCE
Status: COMPLETED | OUTPATIENT
Start: 2022-07-29 | End: 2022-07-29

## 2022-07-29 RX ORDER — METOPROLOL SUCCINATE 50 MG/1
100 TABLET, EXTENDED RELEASE ORAL DAILY
Status: DISCONTINUED | OUTPATIENT
Start: 2022-07-29 | End: 2022-07-30 | Stop reason: HOSPADM

## 2022-07-29 RX ORDER — ENOXAPARIN SODIUM 100 MG/ML
80 INJECTION SUBCUTANEOUS
Status: DISCONTINUED | OUTPATIENT
Start: 2022-07-29 | End: 2022-07-30 | Stop reason: HOSPADM

## 2022-07-29 RX ORDER — MORPHINE SULFATE 4 MG/ML
4 INJECTION, SOLUTION INTRAMUSCULAR; INTRAVENOUS EVERY 4 HOURS PRN
Status: DISCONTINUED | OUTPATIENT
Start: 2022-07-29 | End: 2022-07-30

## 2022-07-29 RX ORDER — REGADENOSON 0.08 MG/ML
0.4 INJECTION, SOLUTION INTRAVENOUS ONCE
Status: COMPLETED | OUTPATIENT
Start: 2022-07-29 | End: 2022-07-29

## 2022-07-29 RX ORDER — LOSARTAN POTASSIUM 25 MG/1
25 TABLET ORAL DAILY
Status: DISCONTINUED | OUTPATIENT
Start: 2022-07-29 | End: 2022-07-30 | Stop reason: HOSPADM

## 2022-07-29 RX ORDER — NAPROXEN SODIUM 220 MG/1
81 TABLET, FILM COATED ORAL DAILY
Status: DISCONTINUED | OUTPATIENT
Start: 2022-07-29 | End: 2022-07-30 | Stop reason: HOSPADM

## 2022-07-29 RX ORDER — FUROSEMIDE 10 MG/ML
40 INJECTION INTRAMUSCULAR; INTRAVENOUS DAILY
Status: DISCONTINUED | OUTPATIENT
Start: 2022-07-29 | End: 2022-07-30 | Stop reason: HOSPADM

## 2022-07-29 RX ORDER — ENOXAPARIN SODIUM 100 MG/ML
40 INJECTION SUBCUTANEOUS EVERY 24 HOURS
Status: DISCONTINUED | OUTPATIENT
Start: 2022-07-29 | End: 2022-07-29

## 2022-07-29 RX ADMIN — FUROSEMIDE 60 MG: 10 INJECTION, SOLUTION INTRAMUSCULAR; INTRAVENOUS at 12:07

## 2022-07-29 RX ADMIN — COLCHICINE 1.2 MG: 0.6 TABLET, FILM COATED ORAL at 02:07

## 2022-07-29 RX ADMIN — MORPHINE SULFATE 6 MG: 2 INJECTION, SOLUTION INTRAMUSCULAR; INTRAVENOUS at 11:07

## 2022-07-29 RX ADMIN — FUROSEMIDE 40 MG: 10 INJECTION, SOLUTION INTRAMUSCULAR; INTRAVENOUS at 11:07

## 2022-07-29 RX ADMIN — LOSARTAN POTASSIUM 25 MG: 25 TABLET, FILM COATED ORAL at 11:07

## 2022-07-29 RX ADMIN — NITROGLYCERIN 0.5 INCH: 20 OINTMENT TOPICAL at 11:07

## 2022-07-29 RX ADMIN — ASPIRIN 81 MG CHEWABLE TABLET 81 MG: 81 TABLET CHEWABLE at 02:07

## 2022-07-29 RX ADMIN — REGADENOSON 0.4 MG: 0.08 INJECTION, SOLUTION INTRAVENOUS at 10:07

## 2022-07-29 RX ADMIN — METOPROLOL SUCCINATE 100 MG: 50 TABLET, EXTENDED RELEASE ORAL at 11:07

## 2022-07-29 RX ADMIN — ATORVASTATIN CALCIUM 40 MG: 40 TABLET, FILM COATED ORAL at 11:07

## 2022-07-29 RX ADMIN — MORPHINE SULFATE 6 MG: 2 INJECTION, SOLUTION INTRAMUSCULAR; INTRAVENOUS at 05:07

## 2022-07-29 RX ADMIN — MORPHINE SULFATE 6 MG: 2 INJECTION, SOLUTION INTRAMUSCULAR; INTRAVENOUS at 10:07

## 2022-07-29 RX ADMIN — ENOXAPARIN SODIUM 80 MG: 80 INJECTION SUBCUTANEOUS at 02:07

## 2022-07-29 RX ADMIN — MORPHINE SULFATE 6 MG: 2 INJECTION, SOLUTION INTRAMUSCULAR; INTRAVENOUS at 04:07

## 2022-07-29 NOTE — CARE UPDATE
Seen and examined this morning with no acute events since admission.  Cardiology consulted.  NM stress this morning.

## 2022-07-29 NOTE — ED TRIAGE NOTES
Presents with c/o chest tightness and L sided CP that started earlier today; pt has significant cardiac Hx (quadruple bypass in 2016); also has c/o R foot pain

## 2022-07-29 NOTE — ASSESSMENT & PLAN NOTE
Patient is identified as having new onset heart failure that is Acute. CHF is currently uncontrolled due to Rales/crackles on pulmonary exam and Pulmonary edema/pleural effusion on CXR. Latest ECHO performed and demonstrates- No results found for this or any previous visit.  . Continue Beta Blocker ACE/ARB and monitor clinical status closely. Monitor on telemetry. Patient is on CHF pathway.  Monitor strict Is&Os and daily weights.  Place on fluid restriction of 1.5 L. Continue to stress to patient importance of self efficacy and  on diet for CHF. Last BNP reviewed- and noted below   Recent Labs   Lab 07/28/22 2036   *   .

## 2022-07-29 NOTE — ASSESSMENT & PLAN NOTE
Patient with known CAD s/p CABG, which is controlled Will continue Statin and monitor for S/Sx of angina/ACS. Continue to monitor on telemetry.   Trend troponin  #1 <0.006  #2 <0.006

## 2022-07-29 NOTE — ASSESSMENT & PLAN NOTE
Patient is chronically on statin.will continue for now. Monitor clinically. Last LDL was No results found for: LDLCALC

## 2022-07-29 NOTE — PLAN OF CARE
[facility] Emergency Dept  Initial Discharge Assessment       Primary Care Provider: Primary Doctor No    Admission Diagnosis: New onset of congestive heart failure [I50.9]  Chest tightness [R07.89]    Admission Date: 7/28/2022  Expected Discharge Date:     Discharge Barriers Identified: None    Payor: [employer] / Plan: [facility] CHOICE PLUS / Product Type: Commercial /     Extended Emergency Contact Information  Primary Emergency Contact: Jeff Raines  Mobile Phone: [phone]  Relation: Other  Preferred language: English   needed? No    Discharge Plan A: Home  Discharge Plan B: Home with family      [id number] - [place], [place] - [address]  [address]  [place] [address]  Phone: [phone] Fax: [phone]    SW met with patient at bedside to complete discharge planning assessment.  Patient alert and oriented xs 4.  Patient verified all demographic information on facesheet is correct.  Patient verified he has NO PCP.  Patient verified primary health insurance is [facility].  Patient with NO home health or DME.  Patient with NO POA or Living Will.  Patient not on dialysis or medication coumadin.  Patient with no 30 day admission.  Patient with no financial issues at this time.  Patient family will provide transportation upon discharge from facility.  Patient independent with ADLs, live with spouse, drives self.      Initial Assessment (most recent)     Adult Discharge Assessment - 07/29/22 1519        Discharge Assessment    Assessment Type Discharge Planning Assessment     Confirmed/corrected address, phone number and insurance Yes     Confirmed Demographics Correct on Facesheet     Source of Information patient     Communicated ARACELIS with patient/caregiver Date not available/Unable to determine     Lives With spouse     Facility Arrived From: home     Do you expect to return to your current living situation? Yes     Do you have help at home or someone to help you manage your care at home?  Yes     Who are your caregiver(s) and their phone number(s)? spouse     Prior to hospitilization cognitive status: Alert/Oriented     Current cognitive status: Alert/Oriented     Walking or Climbing Stairs Difficulty none     Dressing/Bathing Difficulty none     Equipment Currently Used at Home none     Readmission within 30 days? No     Patient currently being followed by outpatient case management? No     Do you currently have service(s) that help you manage your care at home? No     Do you take prescription medications? Yes     Do you have prescription coverage? Yes     Do you have any problems affording any of your prescribed medications? No     Is the patient taking medications as prescribed? yes     Who is going to help you get home at discharge? spouse     How do you get to doctors appointments? car, drives self     Are you on dialysis? No     Do you take coumadin? No     Discharge Plan A Home     Discharge Plan B Home with family     DME Needed Upon Discharge  none     Discharge Plan discussed with: Patient     Discharge Barriers Identified None

## 2022-07-29 NOTE — H&P
"[facility] Medicine  History & Physical    Patient Name: Irwin Richard  MRN: [id number]  Patient Class: OP- Observation  Admission Date: 7/28/2022  Attending Physician: Justino De Los Santos MD  Primary Care Provider: Primary Doctor No         Patient information was obtained from patient, past medical records and ER records.     Subjective:     Principal Problem:Chest pain  Chief Complaint:   Chief Complaint   Patient presents with    Chest Pain     C/o "chest tightness" and "L sided CP" that started earlier today; significant cardiac Hx        HPI: Irwin Richard is a 57 year old male with a past medical history of HTN, HLD, CAD, chronic back pain and gout, with a past surgical history of quadruple cardiac bypass in 2016, who presented to the ED with a complaint of left sided chest pain. He states it began around noon yesterday, sudden onset with associated SOB, left arm numbness and dizziness. He states its been persistent since the onset. He denies diaphoresis, nausea, or vomiting. He also reports right great toe pain secondary to gout, which he has a long history of.  He denies all other complaints. ED work up was significant for negative troponin x 2. BNP elevated at 126 and CXR showed pulmonary edema. The patient denies ever being told he has heart failure, but states he would not doubt it given his extensive cardiac history. EKG shows no acute ST elevation or depression. Hospital Medicine consulted for admission and further management.       Past Medical History:   Diagnosis Date    Coronary artery disease     COVID-19     Gout     Hyperlipidemia     Hypertension        Past Surgical History:   Procedure Laterality Date    CORONARY ARTERY BYPASS GRAFT         Review of patient's allergies indicates:  No Known Allergies    Current Facility-Administered Medications on File Prior to Encounter   Medication    acetaminophen tablet 650 mg    albuterol inhaler 2 puff    diphenhydrAMINE " injection 25 mg    EPINEPHrine (EPIPEN) 0.3 mg/0.3 mL pen injection 0.3 mg    methylPREDNISolone sodium succinate injection 40 mg    ondansetron disintegrating tablet 4 mg    sodium chloride 0.9% 500 mL flush bag    sodium chloride 0.9% flush 10 mL     Current Outpatient Medications on File Prior to Encounter   Medication Sig    losartan (COZAAR) 25 MG tablet Take 25 mg by mouth once daily.    metoprolol succinate (TOPROL-XL) 100 MG 24 hr tablet Take 100 mg by mouth once daily.    amoxicillin-clavulanate 875-125mg (AUGMENTIN) 875-125 mg per tablet Take 1 tablet by mouth 2 (two) times daily.    colchicine (COLCRYS) 0.6 mg tablet Take 2 tablets by mouth.  If the pain is not improving take another tablet 1 hour later.  Do not take more than 3 tablets in 1 day.    HYDROcodone-acetaminophen (NORCO)  mg per tablet Take 1 tablet by mouth every 4 (four) hours as needed for Pain.    ibuprofen (ADVIL,MOTRIN) 600 MG tablet Take 1 tablet (600 mg total) by mouth every 6 (six) hours as needed for Pain.    indomethacin (INDOCIN) 50 MG capsule Take 1 capsule (50 mg total) by mouth 3 (three) times daily as needed.    losartan (COZAAR) 25 MG tablet Take 25 mg by mouth.    metoprolol succinate (TOPROL-XL) 100 MG 24 hr tablet Take 100 mg by mouth.     Family History    None       Tobacco Use    Smoking status: Former Smoker    Smokeless tobacco: Not on file   Substance and Sexual Activity    Alcohol use: Not Currently    Drug use: Not on file    Sexual activity: Not on file     Review of Systems   Constitutional:  Negative for chills and fever.   HENT:  Negative for congestion and sore throat.    Eyes:  Negative for visual disturbance.   Respiratory:  Negative for cough and shortness of breath.    Cardiovascular:  Positive for chest pain. Negative for palpitations.   Gastrointestinal:  Negative for abdominal pain, constipation, diarrhea, nausea and vomiting.   Endocrine: Negative for cold intolerance and heat  intolerance.   Genitourinary:  Negative for dysuria and hematuria.   Musculoskeletal:  Positive for arthralgias and joint swelling. Negative for myalgias.   Skin:  Negative for rash.   Neurological:  Positive for numbness (left arm). Negative for tremors and seizures.   Hematological:  Negative for adenopathy. Does not bruise/bleed easily.   All other systems reviewed and are negative.  Objective:     Vital Signs (Most Recent):  Temp: 97.9 °F (36.6 °C) (07/28/22 2015)  Pulse: 60 (07/29/22 0002)  Resp: 18 (07/28/22 2340)  BP: (!) 146/97 (07/29/22 0002)  SpO2: 96 % (07/29/22 0002)   Vital Signs (24h Range):  Temp:  [97.9 °F (36.6 °C)] 97.9 °F (36.6 °C)  Pulse:  [53-61] 60  Resp:  [16-18] 18  SpO2:  [95 %-98 %] 96 %  BP: (122-155)/(80-99) 146/97     Weight: 81.6 kg (180 lb)  Body mass index is 25.83 kg/m².    Physical Exam  Vitals and nursing note reviewed.   Constitutional:       General: He is awake.      Appearance: Normal appearance. He is well-developed. He is not ill-appearing.   HENT:      Head: Normocephalic and atraumatic.      Nose: Nose normal. No septal deviation.      Mouth/Throat:      Lips: Pink.      Mouth: Mucous membranes are moist.   Eyes:      Conjunctiva/sclera: Conjunctivae normal.      Pupils: Pupils are equal, round, and reactive to light.   Neck:      Thyroid: No thyroid mass.      Vascular: No JVD.      Trachea: No tracheal tenderness or tracheal deviation.   Cardiovascular:      Rate and Rhythm: Normal rate and regular rhythm.      Heart sounds: Normal heart sounds, S1 normal and S2 normal. No murmur heard.    No friction rub. No gallop.   Pulmonary:      Effort: Pulmonary effort is normal.      Breath sounds: Rales present. No decreased breath sounds, wheezing or rhonchi.   Abdominal:      General: Bowel sounds are normal. There is no distension.      Palpations: Abdomen is soft. There is no hepatomegaly, splenomegaly or mass.      Tenderness: There is no abdominal tenderness.    Musculoskeletal:      Right lower leg: No edema.      Left lower leg: No edema.      Right foot: Swelling and tenderness present.        Feet:    Skin:     General: Skin is warm and dry.      Capillary Refill: Capillary refill takes less than 2 seconds.      Findings: Erythema present. No rash.   Neurological:      General: No focal deficit present.      Mental Status: He is alert and oriented to person, place, and time. Mental status is at baseline.      Cranial Nerves: No cranial nerve deficit.      Sensory: Sensation is intact. No sensory deficit.      Motor: Motor function is intact.   Psychiatric:         Mood and Affect: Mood normal.         Behavior: Behavior normal. Behavior is cooperative.         CRANIAL NERVES     CN III, IV, VI   Pupils are equal, round, and reactive to light.     Significant Labs: All pertinent labs within the past 24 hours have been reviewed.  CBC:   Recent Labs   Lab 07/28/22 2036   WBC 7.96   HGB 13.5*   HCT 40.5        CMP:   Recent Labs   Lab 07/28/22 2036      K 3.7      CO2 26   GLU 74   BUN 12   CREATININE 1.0   CALCIUM 9.3   PROT 7.4   ALBUMIN 3.9   BILITOT 0.4   ALKPHOS 73   AST 15   ALT 12   ANIONGAP 9   EGFRNONAA >60     Cardiac Markers:   Recent Labs   Lab 07/28/22 2036   *     Troponin:   Recent Labs   Lab 07/28/22 2036 07/28/22  2308   TROPONINI <0.006 0.008       Significant Imaging: I have reviewed all pertinent imaging results/findings within the past 24 hours.  Imaging Results              X-Ray Chest AP Portable (Final result)  Result time 07/28/22 22:00:06      Final result by Jose Page DO (07/28/22 22:00:06)                   Impression:      Mild interstitial prominence and pulmonary vascular congestion, suspicious for mild edema.    Cardiomegaly.      Electronically signed by: Jose Page  Date:    07/28/2022  Time:    22:00               Narrative:    EXAMINATION:  XR CHEST AP PORTABLE    CLINICAL HISTORY:  Chest pain,  unspecified    TECHNIQUE:  Single frontal view of the chest was performed.    COMPARISON:  None    FINDINGS:  The lungs are well expanded.  There is mild interstitial prominence and pulmonary vascular congestion.  Suture material overlies the left upper lung.  The pleural spaces are clear.    The cardiac silhouette is enlarged.  There are atherosclerotic calcifications of the aortic arch.    The visualized osseous structures demonstrate degenerative changes.  There are median sternotomy wires.                                        Assessment/Plan:     New onset of congestive heart failure  Patient is identified as having new onset heart failure that is Acute. CHF is currently uncontrolled due to Rales/crackles on pulmonary exam and Pulmonary edema/pleural effusion on CXR. Latest ECHO performed and demonstrates- No results found for this or any previous visit.  . Continue Beta Blocker ACE/ARB and monitor clinical status closely. Monitor on telemetry. Patient is on CHF pathway.  Monitor strict Is&Os and daily weights.  Place on fluid restriction of 1.5 L. Continue to stress to patient importance of self efficacy and  on diet for CHF. Last BNP reviewed- and noted below   Recent Labs   Lab 07/28/22 2036   *   .            CAD (coronary artery disease)  Patient with known CAD s/p CABG, which is controlled Will continue Statin and monitor for S/Sx of angina/ACS. Continue to monitor on telemetry.   Trend troponin  #1 <0.006  #2 <0.006        HLD (hyperlipidemia)     Patient is chronically on statin.will continue for now. Monitor clinically. Last LDL was No results found for: LDLCALC       HTN (hypertension)  Chronic, controlled.  Latest blood pressure and vitals reviewed-   Temp:  [97.9 °F (36.6 °C)]   Pulse:  [53-61]   Resp:  [16-18]   BP: (122-155)/(80-99)   SpO2:  [95 %-98 %] .   Home meds for hypertension were reviewed and noted below.   Hypertension Medications             losartan (COZAAR) 25 MG  tablet Take 25 mg by mouth.    losartan (COZAAR) 25 MG tablet Take 25 mg by mouth once daily.    metoprolol succinate (TOPROL-XL) 100 MG 24 hr tablet Take 100 mg by mouth.    metoprolol succinate (TOPROL-XL) 100 MG 24 hr tablet Take 100 mg by mouth once daily.          While in the hospital, will manage blood pressure as follows; Continue home antihypertensive regimen    Will utilize p.r.n. blood pressure medication only if patient's blood pressure greater than  180/110 and he develops symptoms such as worsening chest pain or shortness of breath.        Chronic bilateral low back pain without sciatica  Noted, chronic      Gout of foot  Noted  Continue gout diet  Pain meds PRN      History of coronary artery bypass graft    Noted, telemetry      VTE Risk Mitigation (From admission, onward)         Ordered     enoxaparin injection 40 mg  Daily         07/29/22 0031     IP VTE HIGH RISK PATIENT  Once         07/29/22 0031     Place sequential compression device  Until discontinued         07/29/22 0031                   ROHAN Osorio  Department of Hospital Medicine   [facility] - Emergency Dept

## 2022-07-29 NOTE — ED PROVIDER NOTES
"Encounter Date: 7/28/2022    SCRIBE #1 NOTE: I, Caitlyn Miranda, am scribing for, and in the presence of, Mike Holliday MD.       History     Chief Complaint   Patient presents with    Chest Pain     C/o "chest tightness" and "L sided CP" that started earlier today; significant cardiac Hx     Time seen by provider: 8:24 PM on 07/28/2022    Irwin Richard is a 57 y.o. male with a PMHx of HTN, HLD and CABG who presents to the ED with an onset of central and left chest pain that started around 12 pm today, as well as an episode of dizziness and numbness in his left arm. Patient also presents with right great toe pain and states he is having a gout flare-up. Patient denies current hx of smoking. The patient denies any other symptoms at this time.    The history is provided by the patient.     Review of patient's allergies indicates:  No Known Allergies  Past Medical History:   Diagnosis Date    Coronary artery disease     COVID-19     Gout     Hyperlipidemia     Hypertension      Past Surgical History:   Procedure Laterality Date    CORONARY ARTERY BYPASS GRAFT       Family History   Problem Relation Age of Onset    Heart disease Father      Social History     Tobacco Use    Smoking status: Former Smoker    Smokeless tobacco: Never Used   Substance Use Topics    Alcohol use: Not Currently     Review of Systems   Constitutional: Negative for fever.   HENT: Negative for congestion.    Eyes: Negative for visual disturbance.   Respiratory: Negative for wheezing.    Cardiovascular: Positive for chest pain.   Gastrointestinal: Negative for abdominal pain.   Genitourinary: Negative for dysuria.   Musculoskeletal: Positive for arthralgias. Negative for joint swelling.   Skin: Negative for rash.   Neurological: Positive for dizziness and numbness. Negative for syncope.   Hematological: Does not bruise/bleed easily.   Psychiatric/Behavioral: Negative for confusion.       Physical Exam     Initial Vitals [07/28/22 2015] "   BP Pulse Resp Temp SpO2   (!) 142/87 61 16 97.9 °F (36.6 °C) 98 %      MAP       --         Physical Exam    Nursing note and vitals reviewed.  Constitutional: Vital signs are normal. He appears well-nourished.   HENT:   Head: Normocephalic and atraumatic.   Eyes: Conjunctivae and EOM are normal.   Neck: Neck supple. No thyroid mass present.   Normal range of motion.  Cardiovascular: Normal rate, regular rhythm and normal heart sounds. Exam reveals no gallop and no friction rub.    No murmur heard.  Pulmonary/Chest: Breath sounds normal. He has no wheezes. He has no rhonchi. He has no rales.   Abdominal: Abdomen is soft. Bowel sounds are normal. There is no abdominal tenderness.   Musculoskeletal:      Cervical back: Normal range of motion and neck supple.      Comments: Right large toe pain. Well-healed midline sternotomy scar.     Neurological: He is alert and oriented to person, place, and time. He has normal strength. No cranial nerve deficit or sensory deficit.   Skin: Skin is warm and dry. No rash noted. No erythema.   Psychiatric: He has a normal mood and affect. His speech is normal. Cognition and memory are normal.         ED Course   Procedures  Labs Reviewed   CBC W/ AUTO DIFFERENTIAL - Abnormal; Notable for the following components:       Result Value    RBC 4.43 (*)     Hemoglobin 13.5 (*)     All other components within normal limits   B-TYPE NATRIURETIC PEPTIDE - Abnormal; Notable for the following components:     (*)     All other components within normal limits   COMPREHENSIVE METABOLIC PANEL   TROPONIN I   TROPONIN I   HEMOGLOBIN A1C   MAGNESIUM   LIPID PANEL   TSH   PHOSPHORUS   TROPONIN I   CBC W/ AUTO DIFFERENTIAL     EKG Readings: (Independently Interpreted)   Sinus bradycardia at a rate of 59 bpm. Normal axis. Normal intervals, possible left lateral infarct, age undetermined. No STEMI.       Imaging Results          X-Ray Chest AP Portable (Final result)  Result time 07/28/22 22:00:06     Final result by Jose Page DO (07/28/22 22:00:06)                 Impression:      Mild interstitial prominence and pulmonary vascular congestion, suspicious for mild edema.    Cardiomegaly.      Electronically signed by: Jose Page  Date:    07/28/2022  Time:    22:00             Narrative:    EXAMINATION:  XR CHEST AP PORTABLE    CLINICAL HISTORY:  Chest pain, unspecified    TECHNIQUE:  Single frontal view of the chest was performed.    COMPARISON:  None    FINDINGS:  The lungs are well expanded.  There is mild interstitial prominence and pulmonary vascular congestion.  Suture material overlies the left upper lung.  The pleural spaces are clear.    The cardiac silhouette is enlarged.  There are atherosclerotic calcifications of the aortic arch.    The visualized osseous structures demonstrate degenerative changes.  There are median sternotomy wires.                                 Medications   losartan tablet 25 mg (has no administration in time range)   metoprolol succinate (TOPROL-XL) 24 hr tablet 100 mg (has no administration in time range)   sodium chloride 0.9% flush 10 mL (has no administration in time range)   enoxaparin injection 40 mg (has no administration in time range)   furosemide injection 40 mg (has no administration in time range)   morphine injection 6 mg (has no administration in time range)   morphine injection 4 mg (has no administration in time range)   atorvastatin tablet 40 mg (has no administration in time range)   aspirin tablet 325 mg (325 mg Oral Given 7/28/22 2036)   sodium chloride 0.9% bolus 500 mL (0 mLs Intravenous Stopped 7/28/22 2339)   morphine injection 8 mg (8 mg Intravenous Given 7/28/22 2043)   ketorolac injection 15 mg (15 mg Intravenous Given 7/28/22 2341)   morphine injection 8 mg (8 mg Intravenous Given 7/28/22 2343)   furosemide injection 60 mg (60 mg Intravenous Given 7/29/22 0012)     Medical Decision Making:   History:   Old Medical Records: I decided to  obtain old medical records.  Independently Interpreted Test(s):   I have ordered and independently interpreted EKG Reading(s) - see prior notes  Clinical Tests:   Lab Tests: Ordered and Reviewed  Medical Tests: Ordered and Reviewed  ED Management:      Irwin Richard is a 57 y.o. male who presents to the Emergency Department with left-sided chest pain, left arm numbness.  My overall impression is congestive heart failure exacerbation.  I do not think the patient has acute coronary syndrome, pulmonary embolism, significant symptomatic anemia, grave electrolyte abnormality, pneumonia, pneumothorax.  Chest x-ray concerning for CHF.  BNP is minimally elevated.  Patient started on Lasix in the emergency department.  He will be placed in observation for further diuresing and cardiac workup.  He is in agreement with this plan of care and will primarily admitted by the on-call nurse practitioner hospitalist.          Scribe Attestation:   Scribe #1: I performed the above scribed service and the documentation accurately describes the services I performed. I attest to the accuracy of the note.               I, Dr. Mike Holliday, personally performed the services described in this documentation. All medical record entries made by the scribe were at my direction and in my presence.  I have reviewed the chart and agree that the record reflects my personal performance and is accurate and complete. Mike Holliday MD.  2:50 AM 07/29/2022    Clinical Impression:   Final diagnoses:  [R07.89] Chest tightness  [R07.9] Chest pain  [I50.9] New onset of congestive heart failure          ED Disposition Condition    Observation               Mike Holliday MD  07/29/22 0252

## 2022-07-29 NOTE — CONSULTS
[facility] Emergency Dept  Cardiology  Consult Note    Patient Name: Irwin Richard  MRN: [id number]  Admission Date: 7/28/2022  Hospital Length of Stay: 0 days  Code Status: Full Code   Attending Provider: Justino De Los Santos MD   Consulting Provider: Zeb Torres MD  Primary Care Physician: Primary Doctor No  Principal Problem:<principal problem not specified>    Patient information was obtained from patient and ER records.     Consults  Subjective:     Chief Complaint:  Chest pain and foot pain     HPI: * patient presented to the emergency room with right foot pain that he felt was gout as well as some pressure type sensation in his chest without acute ST-T changes or troponin rise is  Patient has had CABG in then and proximally 4 years ago had a stent placed  Is admitted now for observation   He is on losartan and metoprolol  He had a nuclear stress test today EKG portion was normal  He MPI images did demonstrate a old anterior lateral infarction small to moderate degree was slight reversibility noted      Past Medical History:   Diagnosis Date    Coronary artery disease     COVID-19     Gout     Hyperlipidemia     Hypertension        Past Surgical History:   Procedure Laterality Date    CORONARY ARTERY BYPASS GRAFT         Review of patient's allergies indicates:  No Known Allergies    Current Facility-Administered Medications on File Prior to Encounter   Medication    acetaminophen tablet 650 mg    albuterol inhaler 2 puff    diphenhydrAMINE injection 25 mg    EPINEPHrine (EPIPEN) 0.3 mg/0.3 mL pen injection 0.3 mg    methylPREDNISolone sodium succinate injection 40 mg    ondansetron disintegrating tablet 4 mg    sodium chloride 0.9% 500 mL flush bag    sodium chloride 0.9% flush 10 mL     Current Outpatient Medications on File Prior to Encounter   Medication Sig    losartan (COZAAR) 25 MG tablet Take 25 mg by mouth once daily.    metoprolol succinate (TOPROL-XL) 100 MG 24 hr tablet Take 100 mg  by mouth once daily.    amoxicillin-clavulanate 875-125mg (AUGMENTIN) 875-125 mg per tablet Take 1 tablet by mouth 2 (two) times daily.    colchicine (COLCRYS) 0.6 mg tablet Take 2 tablets by mouth.  If the pain is not improving take another tablet 1 hour later.  Do not take more than 3 tablets in 1 day.    HYDROcodone-acetaminophen (NORCO)  mg per tablet Take 1 tablet by mouth every 4 (four) hours as needed for Pain.    ibuprofen (ADVIL,MOTRIN) 600 MG tablet Take 1 tablet (600 mg total) by mouth every 6 (six) hours as needed for Pain.    indomethacin (INDOCIN) 50 MG capsule Take 1 capsule (50 mg total) by mouth 3 (three) times daily as needed.    losartan (COZAAR) 25 MG tablet Take 25 mg by mouth.    metoprolol succinate (TOPROL-XL) 100 MG 24 hr tablet Take 100 mg by mouth.     Family History     Problem Relation (Age of Onset)    Heart disease Father        Tobacco Use    Smoking status: Former Smoker    Smokeless tobacco: Never Used   Substance and Sexual Activity    Alcohol use: Not Currently    Drug use: Not on file    Sexual activity: Not on file     ROS  Objective:     Vital Signs (Most Recent):  Temp: 97.7 °F (36.5 °C) (07/29/22 1300)  Pulse: (!) 59 (07/29/22 1400)  Resp: 19 (07/29/22 1136)  BP: 125/89 (07/29/22 1400)  SpO2: 97 % (07/29/22 1400) Vital Signs (24h Range):  Temp:  [97.7 °F (36.5 °C)-97.9 °F (36.6 °C)] 97.7 °F (36.5 °C)  Pulse:  [50-68] 59  Resp:  [16-19] 19  SpO2:  [94 %-99 %] 97 %  BP: (104-155)/(59-99) 125/89     Weight: 81.6 kg (180 lb)  Body mass index is 25.83 kg/m².    SpO2: 97 %         Intake/Output Summary (Last 24 hours) at 7/29/2022 1600  Last data filed at 7/29/2022 1215  Gross per 24 hour   Intake 860 ml   Output 2800 ml   Net -1940 ml       Lines/Drains/Airways     Peripheral Intravenous Line  Duration                Peripheral IV - Single Lumen 07/28/22 2030 20 G Left Antecubital <1 day                Physical Exam blood pressure is 150/90  No bruit  Lungs are  clear  Cardiac S4  Abdomen no mass  Extremities slight decreased pulses    Significant Labs:   CMP   Recent Labs   Lab 07/28/22 2036      K 3.7      CO2 26   GLU 74   BUN 12   CREATININE 1.0   CALCIUM 9.3   PROT 7.4   ALBUMIN 3.9   BILITOT 0.4   ALKPHOS 73   AST 15   ALT 12   ANIONGAP 9   ESTGFRAFRICA >60   EGFRNONAA >60   , CBC   Recent Labs   Lab 07/28/22 2036 07/29/22  0525   WBC 7.96 7.10   HGB 13.5* 14.0   HCT 40.5 41.7    155    and Troponin   Recent Labs   Lab 07/28/22 2036 07/28/22  2308 07/29/22  0525   TROPONINI <0.006 0.008 <0.006       · Significant ImagingThe left ventricle is normal in size with concentric remodeling and low normal systolic function.  · The estimated ejection fraction is 50%.  · Normal left ventricular diastolic function.  · There is mild left ventricular global hypokinesis.  · Normal right ventricular size with normal right ventricular systolic function.  · Normal central venous pressure (3 mmHg).  · The estimated PA systolic pressure is 24 mmHg.       :   Assessment and Plan:   And gout-she should be on Ecotrin 81 a day.  He may not be on this because of increasing uric acid at 0 8 but him on this and then treat uric acid with allopurinol or  Probenecid  2-he does have smallerarea reversible ischemia at slight of old infarction-be medically for now considering Isordil  Keep diastolic pressure below 80  If he continues with chest pain especially with nitroglycerin whole need repeat angiography    Active Diagnoses:    Diagnosis Date Noted POA    New onset of congestive heart failure [I50.9] 07/29/2022 Yes    HTN (hypertension) [I10] 07/28/2022 Yes    HLD (hyperlipidemia) [E78.5] 07/28/2022 Yes    CAD (coronary artery disease) [I25.10] 07/28/2022 Yes    Gout of foot [M10.9] 04/05/2021 Yes    History of coronary artery bypass graft [Z95.1] 01/15/2020 Not Applicable    Chronic bilateral low back pain without sciatica [M54.50, G89.29] 01/15/2020 Yes       Problems Resolved During this Admission:       VTE Risk Mitigation (From admission, onward)         Ordered     enoxaparin injection 80 mg  Every 24 hours (non-standard times)         07/29/22 1332     IP VTE HIGH RISK PATIENT  Once         07/29/22 0031     Place sequential compression device  Until discontinued         07/29/22 0031              I personally reviewed old and new ecg's, lab reports,, xray reports  and  other cardiovascular studies including  echo's, stress tests, angiogram reports, holters,and vascular studies .  In addition I evaluated original cardiac cath  ___echo  ____cxr ______ct ____scan on [employer] view or [employer] or other viewing platforms .  I reviewed  office and hospital notes Yes x ____ of  referring providers.  Thank you for your consult.     Zeb Torres MD  Cardiology   [facility] - Emergency Dept

## 2022-07-29 NOTE — ASSESSMENT & PLAN NOTE
Chronic, controlled.  Latest blood pressure and vitals reviewed-   Temp:  [97.9 °F (36.6 °C)]   Pulse:  [53-61]   Resp:  [16-18]   BP: (122-155)/(80-99)   SpO2:  [95 %-98 %] .   Home meds for hypertension were reviewed and noted below.   Hypertension Medications             losartan (COZAAR) 25 MG tablet Take 25 mg by mouth.    losartan (COZAAR) 25 MG tablet Take 25 mg by mouth once daily.    metoprolol succinate (TOPROL-XL) 100 MG 24 hr tablet Take 100 mg by mouth.    metoprolol succinate (TOPROL-XL) 100 MG 24 hr tablet Take 100 mg by mouth once daily.          While in the hospital, will manage blood pressure as follows; Continue home antihypertensive regimen    Will utilize p.r.n. blood pressure medication only if patient's blood pressure greater than  180/110 and he develops symptoms such as worsening chest pain or shortness of breath.

## 2022-07-29 NOTE — SUBJECTIVE & OBJECTIVE
Past Medical History:   Diagnosis Date    Coronary artery disease     COVID-19     Gout     Hyperlipidemia     Hypertension        Past Surgical History:   Procedure Laterality Date    CORONARY ARTERY BYPASS GRAFT         Review of patient's allergies indicates:  No Known Allergies    Current Facility-Administered Medications on File Prior to Encounter   Medication    acetaminophen tablet 650 mg    albuterol inhaler 2 puff    diphenhydrAMINE injection 25 mg    EPINEPHrine (EPIPEN) 0.3 mg/0.3 mL pen injection 0.3 mg    methylPREDNISolone sodium succinate injection 40 mg    ondansetron disintegrating tablet 4 mg    sodium chloride 0.9% 500 mL flush bag    sodium chloride 0.9% flush 10 mL     Current Outpatient Medications on File Prior to Encounter   Medication Sig    losartan (COZAAR) 25 MG tablet Take 25 mg by mouth once daily.    metoprolol succinate (TOPROL-XL) 100 MG 24 hr tablet Take 100 mg by mouth once daily.    amoxicillin-clavulanate 875-125mg (AUGMENTIN) 875-125 mg per tablet Take 1 tablet by mouth 2 (two) times daily.    colchicine (COLCRYS) 0.6 mg tablet Take 2 tablets by mouth.  If the pain is not improving take another tablet 1 hour later.  Do not take more than 3 tablets in 1 day.    HYDROcodone-acetaminophen (NORCO)  mg per tablet Take 1 tablet by mouth every 4 (four) hours as needed for Pain.    ibuprofen (ADVIL,MOTRIN) 600 MG tablet Take 1 tablet (600 mg total) by mouth every 6 (six) hours as needed for Pain.    indomethacin (INDOCIN) 50 MG capsule Take 1 capsule (50 mg total) by mouth 3 (three) times daily as needed.    losartan (COZAAR) 25 MG tablet Take 25 mg by mouth.    metoprolol succinate (TOPROL-XL) 100 MG 24 hr tablet Take 100 mg by mouth.     Family History    None       Tobacco Use    Smoking status: Former Smoker    Smokeless tobacco: Not on file   Substance and Sexual Activity    Alcohol use: Not Currently    Drug use: Not on file    Sexual activity: Not on file     Review of  Systems   Constitutional:  Negative for chills and fever.   HENT:  Negative for congestion and sore throat.    Eyes:  Negative for visual disturbance.   Respiratory:  Negative for cough and shortness of breath.    Cardiovascular:  Positive for chest pain. Negative for palpitations.   Gastrointestinal:  Negative for abdominal pain, constipation, diarrhea, nausea and vomiting.   Endocrine: Negative for cold intolerance and heat intolerance.   Genitourinary:  Negative for dysuria and hematuria.   Musculoskeletal:  Positive for arthralgias and joint swelling. Negative for myalgias.   Skin:  Negative for rash.   Neurological:  Positive for numbness (left arm). Negative for tremors and seizures.   Hematological:  Negative for adenopathy. Does not bruise/bleed easily.   All other systems reviewed and are negative.  Objective:     Vital Signs (Most Recent):  Temp: 97.9 °F (36.6 °C) (07/28/22 2015)  Pulse: 60 (07/29/22 0002)  Resp: 18 (07/28/22 2340)  BP: (!) 146/97 (07/29/22 0002)  SpO2: 96 % (07/29/22 0002)   Vital Signs (24h Range):  Temp:  [97.9 °F (36.6 °C)] 97.9 °F (36.6 °C)  Pulse:  [53-61] 60  Resp:  [16-18] 18  SpO2:  [95 %-98 %] 96 %  BP: (122-155)/(80-99) 146/97     Weight: 81.6 kg (180 lb)  Body mass index is 25.83 kg/m².    Physical Exam  Vitals and nursing note reviewed.   Constitutional:       General: He is awake.      Appearance: Normal appearance. He is well-developed. He is not ill-appearing.   HENT:      Head: Normocephalic and atraumatic.      Nose: Nose normal. No septal deviation.      Mouth/Throat:      Lips: Pink.      Mouth: Mucous membranes are moist.   Eyes:      Conjunctiva/sclera: Conjunctivae normal.      Pupils: Pupils are equal, round, and reactive to light.   Neck:      Thyroid: No thyroid mass.      Vascular: No JVD.      Trachea: No tracheal tenderness or tracheal deviation.   Cardiovascular:      Rate and Rhythm: Normal rate and regular rhythm.      Heart sounds: Normal heart sounds, S1  normal and S2 normal. No murmur heard.    No friction rub. No gallop.   Pulmonary:      Effort: Pulmonary effort is normal.      Breath sounds: Rales present. No decreased breath sounds, wheezing or rhonchi.   Abdominal:      General: Bowel sounds are normal. There is no distension.      Palpations: Abdomen is soft. There is no hepatomegaly, splenomegaly or mass.      Tenderness: There is no abdominal tenderness.   Musculoskeletal:      Right lower leg: No edema.      Left lower leg: No edema.      Right foot: Swelling and tenderness present.        Feet:    Skin:     General: Skin is warm and dry.      Capillary Refill: Capillary refill takes less than 2 seconds.      Findings: Erythema present. No rash.   Neurological:      General: No focal deficit present.      Mental Status: He is alert and oriented to person, place, and time. Mental status is at baseline.      Cranial Nerves: No cranial nerve deficit.      Sensory: Sensation is intact. No sensory deficit.      Motor: Motor function is intact.   Psychiatric:         Mood and Affect: Mood normal.         Behavior: Behavior normal. Behavior is cooperative.         CRANIAL NERVES     CN III, IV, VI   Pupils are equal, round, and reactive to light.     Significant Labs: All pertinent labs within the past 24 hours have been reviewed.  CBC:   Recent Labs   Lab 07/28/22 2036   WBC 7.96   HGB 13.5*   HCT 40.5        CMP:   Recent Labs   Lab 07/28/22 2036      K 3.7      CO2 26   GLU 74   BUN 12   CREATININE 1.0   CALCIUM 9.3   PROT 7.4   ALBUMIN 3.9   BILITOT 0.4   ALKPHOS 73   AST 15   ALT 12   ANIONGAP 9   EGFRNONAA >60     Cardiac Markers:   Recent Labs   Lab 07/28/22 2036   *     Troponin:   Recent Labs   Lab 07/28/22 2036 07/28/22  2308   TROPONINI <0.006 0.008       Significant Imaging: I have reviewed all pertinent imaging results/findings within the past 24 hours.  Imaging Results              X-Ray Chest AP Portable (Final result)   Result time 07/28/22 22:00:06      Final result by Jose Page DO (07/28/22 22:00:06)                   Impression:      Mild interstitial prominence and pulmonary vascular congestion, suspicious for mild edema.    Cardiomegaly.      Electronically signed by: Jose Page  Date:    07/28/2022  Time:    22:00               Narrative:    EXAMINATION:  XR CHEST AP PORTABLE    CLINICAL HISTORY:  Chest pain, unspecified    TECHNIQUE:  Single frontal view of the chest was performed.    COMPARISON:  None    FINDINGS:  The lungs are well expanded.  There is mild interstitial prominence and pulmonary vascular congestion.  Suture material overlies the left upper lung.  The pleural spaces are clear.    The cardiac silhouette is enlarged.  There are atherosclerotic calcifications of the aortic arch.    The visualized osseous structures demonstrate degenerative changes.  There are median sternotomy wires.

## 2022-07-29 NOTE — HPI
Irwin Richard is a 57 year old male with a past medical history of HTN, HLD, CAD, chronic back pain and gout, with a past surgical history of quadruple cardiac bypass in 2016, who presented to the ED with a complaint of left sided chest pain. He states it began around noon yesterday, sudden onset with associated SOB, left arm numbness and dizziness. He states its been persistent since the onset. He denies diaphoresis, nausea, or vomiting. He also reports right great toe pain secondary to gout, which he has a long history of.  He denies all other complaints. ED work up was significant for negative troponin x 2. BNP elevated at 126 and CXR showed pulmonary edema. The patient denies ever being told he has heart failure, but states he would not doubt it given his extensive cardiac history. EKG shows no acute ST elevation or depression. Hospital Medicine consulted for admission and further management.

## 2022-07-30 VITALS
RESPIRATION RATE: 18 BRPM | WEIGHT: 178.81 LBS | OXYGEN SATURATION: 93 % | HEART RATE: 74 BPM | HEIGHT: 70 IN | DIASTOLIC BLOOD PRESSURE: 72 MMHG | BODY MASS INDEX: 25.6 KG/M2 | SYSTOLIC BLOOD PRESSURE: 140 MMHG | TEMPERATURE: 97 F

## 2022-07-30 LAB
ANION GAP SERPL CALC-SCNC: 12 MMOL/L (ref 8–16)
BASOPHILS # BLD AUTO: 0.03 K/UL (ref 0–0.2)
BASOPHILS NFR BLD: 0.4 % (ref 0–1.9)
BUN SERPL-MCNC: 13 MG/DL (ref 6–20)
CALCIUM SERPL-MCNC: 9.2 MG/DL (ref 8.7–10.5)
CHLORIDE SERPL-SCNC: 102 MMOL/L (ref 95–110)
CO2 SERPL-SCNC: 28 MMOL/L (ref 23–29)
CREAT SERPL-MCNC: 0.9 MG/DL (ref 0.5–1.4)
DIFFERENTIAL METHOD: NORMAL
EOSINOPHIL # BLD AUTO: 0.2 K/UL (ref 0–0.5)
EOSINOPHIL NFR BLD: 3.2 % (ref 0–8)
ERYTHROCYTE [DISTWIDTH] IN BLOOD BY AUTOMATED COUNT: 11.5 % (ref 11.5–14.5)
EST. GFR  (AFRICAN AMERICAN): >60 ML/MIN/1.73 M^2
EST. GFR  (NON AFRICAN AMERICAN): >60 ML/MIN/1.73 M^2
GLUCOSE SERPL-MCNC: 87 MG/DL (ref 70–110)
HCT VFR BLD AUTO: 42.6 % (ref 40–54)
HGB BLD-MCNC: 14.1 G/DL (ref 14–18)
IMM GRANULOCYTES # BLD AUTO: 0.02 K/UL (ref 0–0.04)
IMM GRANULOCYTES NFR BLD AUTO: 0.3 % (ref 0–0.5)
LYMPHOCYTES # BLD AUTO: 2.7 K/UL (ref 1–4.8)
LYMPHOCYTES NFR BLD: 36 % (ref 18–48)
MCH RBC QN AUTO: 30.3 PG (ref 27–31)
MCHC RBC AUTO-ENTMCNC: 33.1 G/DL (ref 32–36)
MCV RBC AUTO: 91 FL (ref 82–98)
MONOCYTES # BLD AUTO: 0.7 K/UL (ref 0.3–1)
MONOCYTES NFR BLD: 9.4 % (ref 4–15)
NEUTROPHILS # BLD AUTO: 3.9 K/UL (ref 1.8–7.7)
NEUTROPHILS NFR BLD: 50.7 % (ref 38–73)
NRBC BLD-RTO: 0 /100 WBC
PLATELET # BLD AUTO: 163 K/UL (ref 150–450)
PMV BLD AUTO: 10 FL (ref 9.2–12.9)
POTASSIUM SERPL-SCNC: 3.9 MMOL/L (ref 3.5–5.1)
RBC # BLD AUTO: 4.66 M/UL (ref 4.6–6.2)
SODIUM SERPL-SCNC: 142 MMOL/L (ref 136–145)
WBC # BLD AUTO: 7.58 K/UL (ref 3.9–12.7)

## 2022-07-30 PROCEDURE — 94761 N-INVAS EAR/PLS OXIMETRY MLT: CPT

## 2022-07-30 PROCEDURE — 25000003 PHARM REV CODE 250: Performed by: NURSE PRACTITIONER

## 2022-07-30 PROCEDURE — 63600175 PHARM REV CODE 636 W HCPCS: Performed by: NURSE PRACTITIONER

## 2022-07-30 PROCEDURE — 99232 PR SUBSEQUENT HOSPITAL CARE,LEVL II: ICD-10-PCS | Mod: ,,, | Performed by: INTERNAL MEDICINE

## 2022-07-30 PROCEDURE — 36415 COLL VENOUS BLD VENIPUNCTURE: CPT | Performed by: NURSE PRACTITIONER

## 2022-07-30 PROCEDURE — 85025 COMPLETE CBC W/AUTO DIFF WBC: CPT | Performed by: NURSE PRACTITIONER

## 2022-07-30 PROCEDURE — 99232 SBSQ HOSP IP/OBS MODERATE 35: CPT | Mod: ,,, | Performed by: INTERNAL MEDICINE

## 2022-07-30 PROCEDURE — 80048 BASIC METABOLIC PNL TOTAL CA: CPT | Performed by: NURSE PRACTITIONER

## 2022-07-30 RX ORDER — LOSARTAN POTASSIUM 25 MG/1
25 TABLET ORAL DAILY
Qty: 90 TABLET | Refills: 0 | Status: SHIPPED | OUTPATIENT
Start: 2022-07-30 | End: 2022-11-21 | Stop reason: SDUPTHER

## 2022-07-30 RX ORDER — NITROGLYCERIN 0.4 MG/1
0.4 TABLET SUBLINGUAL EVERY 5 MIN PRN
Qty: 15 TABLET | Refills: 0 | Status: SHIPPED | OUTPATIENT
Start: 2022-07-30 | End: 2022-11-21 | Stop reason: SDUPTHER

## 2022-07-30 RX ORDER — HYDROCODONE BITARTRATE AND ACETAMINOPHEN 10; 325 MG/1; MG/1
1 TABLET ORAL EVERY 4 HOURS PRN
Qty: 12 TABLET | Refills: 0 | Status: CANCELLED | OUTPATIENT
Start: 2022-07-30

## 2022-07-30 RX ORDER — METHYLPREDNISOLONE 4 MG/1
TABLET ORAL
Qty: 21 EACH | Refills: 0 | Status: SHIPPED | OUTPATIENT
Start: 2022-07-30 | End: 2022-08-18

## 2022-07-30 RX ORDER — NAPROXEN SODIUM 220 MG/1
81 TABLET, FILM COATED ORAL DAILY
Qty: 90 TABLET | Refills: 0 | Status: SHIPPED | OUTPATIENT
Start: 2022-07-30 | End: 2023-07-30

## 2022-07-30 RX ORDER — HYDROCODONE BITARTRATE AND ACETAMINOPHEN 5; 325 MG/1; MG/1
1 TABLET ORAL EVERY 6 HOURS PRN
Status: DISCONTINUED | OUTPATIENT
Start: 2022-07-30 | End: 2022-07-30 | Stop reason: HOSPADM

## 2022-07-30 RX ORDER — INDOMETHACIN 50 MG/1
50 CAPSULE ORAL
Qty: 15 CAPSULE | Refills: 0 | Status: SHIPPED | OUTPATIENT
Start: 2022-07-30 | End: 2022-09-01

## 2022-07-30 RX ORDER — ATORVASTATIN CALCIUM 40 MG/1
40 TABLET, FILM COATED ORAL DAILY
Qty: 90 TABLET | Refills: 0 | Status: SHIPPED | OUTPATIENT
Start: 2022-07-30 | End: 2022-09-29 | Stop reason: SDUPTHER

## 2022-07-30 RX ORDER — HYDROCODONE BITARTRATE AND ACETAMINOPHEN 7.5; 325 MG/1; MG/1
1 TABLET ORAL EVERY 6 HOURS PRN
Qty: 12 TABLET | Refills: 0 | Status: SHIPPED | OUTPATIENT
Start: 2022-07-30 | End: 2022-08-18

## 2022-07-30 RX ORDER — HYDROCODONE BITARTRATE AND ACETAMINOPHEN 10; 325 MG/1; MG/1
1 TABLET ORAL EVERY 6 HOURS PRN
Status: DISCONTINUED | OUTPATIENT
Start: 2022-07-30 | End: 2022-07-30 | Stop reason: HOSPADM

## 2022-07-30 RX ORDER — METOPROLOL SUCCINATE 100 MG/1
100 TABLET, EXTENDED RELEASE ORAL DAILY
Qty: 90 TABLET | Refills: 0 | Status: SHIPPED | OUTPATIENT
Start: 2022-07-30 | End: 2022-11-21 | Stop reason: SDUPTHER

## 2022-07-30 RX ADMIN — LOSARTAN POTASSIUM 25 MG: 25 TABLET, FILM COATED ORAL at 10:07

## 2022-07-30 RX ADMIN — ENOXAPARIN SODIUM 80 MG: 80 INJECTION SUBCUTANEOUS at 02:07

## 2022-07-30 RX ADMIN — COLCHICINE 0.6 MG: 0.6 TABLET, FILM COATED ORAL at 10:07

## 2022-07-30 RX ADMIN — METOPROLOL SUCCINATE 100 MG: 50 TABLET, EXTENDED RELEASE ORAL at 10:07

## 2022-07-30 RX ADMIN — HYDROCODONE BITARTRATE AND ACETAMINOPHEN 1 TABLET: 5; 325 TABLET ORAL at 12:07

## 2022-07-30 RX ADMIN — FUROSEMIDE 40 MG: 10 INJECTION, SOLUTION INTRAMUSCULAR; INTRAVENOUS at 10:07

## 2022-07-30 RX ADMIN — ASPIRIN 81 MG CHEWABLE TABLET 81 MG: 81 TABLET CHEWABLE at 10:07

## 2022-07-30 RX ADMIN — ATORVASTATIN CALCIUM 40 MG: 40 TABLET, FILM COATED ORAL at 10:07

## 2022-07-30 NOTE — PROGRESS NOTES
[facility]  Department of Cardiology  Progress Note    PATIENT NAME: Irwin Richard  MRN: [id number]  TODAY'S DATE: 07/30/2022  ADMIT DATE: 7/28/2022    SUBJECTIVE     PRINCIPLE PROBLEM: <principal problem not specified>    INTERVAL HISTORY:    7/30/2022  Denies any chest pains any chest tightness.  EKG, troponins have been within normal limits.  Stress test no EKG changes the myocardial perfusion scan no evidence of ischemia.    HPI  Mr. Richard is a 57-year-old male with history of coronary artery disease previous coronary artery bypass performed in [place].  He had a stent placed after his coronary artery bypass.  He is fairly active without exercise induced angina.  He has occasional episodes of angina an uses sublingual nitroglycerin.  He came to the emergency room complaining of gout exacerbation.  He also complained of mild chest tightness that have resolved.    Review of patient's allergies indicates:  No Known Allergies    REVIEW OF SYSTEMS  CARDIOVASCULAR: No recent chest pain, palpitations, arm, neck, or jaw pain  RESPIRATORY: No recent fever, cough chills, SOB or congestion  : No blood in the urine  GI: No Nausea, vomiting, constipation, diarrhea, blood, or reflux.  MUSCULOSKELETAL: No myalgias  NEURO: No lightheadedness or dizziness  EYES: No Double vision, blurry, vision or headache     OBJECTIVE     VITAL SIGNS (Most Recent)  Temp: 98.8 °F (37.1 °C) (07/30/22 0725)  Pulse: (!) 57 (07/30/22 0725)  Resp: 18 (07/30/22 0725)  BP: 111/63 (07/30/22 0725)  SpO2: 98 % (07/30/22 0725)    VENTILATION STATUS  Resp: 18 (07/30/22 0725)  SpO2: 98 % (07/30/22 0725)       I & O (Last 24H):    Intake/Output Summary (Last 24 hours) at 7/30/2022 0946  Last data filed at 7/29/2022 1215  Gross per 24 hour   Intake 360 ml   Output 1600 ml   Net -1240 ml       WEIGHTS  Wt Readings from Last 1 Encounters:   07/29/22 2025 81.1 kg (178 lb 12.7 oz)   07/29/22 0645 81.6 kg (180 lb)   07/28/22 2015 81.6 kg (180 lb)        PHYSICAL EXAM  CONSTITUTIONAL: Well built, well nourished in no apparent distress  NECK: no carotid bruit, no JVD  LUNGS: CTA  CHEST WALL: no tenderness  HEART: regular rate and rhythm, S1, S2 normal, no murmur, click, rub or gallop   ABDOMEN: soft, non-tender; bowel sounds normal; no masses,  no organomegaly  EXTREMITIES: Extremities normal, no edema  NEURO: AAO X 3    SCHEDULED MEDS:   aspirin  81 mg Oral Daily    atorvastatin  40 mg Oral Daily    colchicine  0.6 mg Oral Daily    enoxaparin  80 mg Subcutaneous Q24H    furosemide (LASIX) injection  40 mg Intravenous Daily    losartan  25 mg Oral Daily    metoprolol succinate  100 mg Oral Daily    nitroGLYCERIN 2% TD oint  0.5 inch Topical (Top) Q6H       CONTINUOUS INFUSIONS:    PRN MEDS:morphine, morphine, sodium chloride 0.9%    LABS AND DIAGNOSTICS     CBC LAST 3 DAYS  Recent Labs   Lab 07/28/22 2036 07/29/22  0525 07/30/22  0508   WBC 7.96 7.10 7.58   RBC 4.43* 4.58* 4.66   HGB 13.5* 14.0 14.1   HCT 40.5 41.7 42.6   MCV 91 91 91   MCH 30.5 30.6 30.3   MCHC 33.3 33.6 33.1   RDW 11.8 11.7 11.5    155 163   MPV 10.6 10.0 10.0   GRAN 45.3  3.6 46.1  3.3 50.7  3.9   LYMPH 42.2  3.4 40.1  2.9 36.0  2.7   MONO 9.2  0.7 9.2  0.7 9.4  0.7   BASO 0.02 0.04 0.03   NRBC 0 0 0       COAGULATION LAST 3 DAYS  No results for input(s): LABPT, INR, APTT in the last 168 hours.    CHEMISTRY LAST 3 DAYS  Recent Labs   Lab 07/28/22 2036 07/29/22  0525 07/30/22  0508     --  142   K 3.7  --  3.9     --  102   CO2 26  --  28   ANIONGAP 9  --  12   BUN 12  --  13   CREATININE 1.0  --  0.9   GLU 74  --  87   CALCIUM 9.3  --  9.2   MG  --  2.1  --    ALBUMIN 3.9  --   --    PROT 7.4  --   --    ALKPHOS 73  --   --    ALT 12  --   --    AST 15  --   --    BILITOT 0.4  --   --        CARDIAC PROFILE LAST 3 DAYS  Recent Labs   Lab 07/28/22 2036 07/28/22  2308 07/29/22  0525   *  --   --    TROPONINI <0.006 0.008 <0.006       ENDOCRINE LAST  3 DAYS  Recent Labs   Lab 07/29/22  0525   TSH 3.808       LAST ARTERIAL BLOOD GAS  ABG  No results for input(s): PH, PO2, PCO2, HCO3, BE in the last 168 hours.    LAST 7 DAYS MICROBIOLOGY   Microbiology Results (last 7 days)     ** No results found for the last 168 hours. **          MOST RECENT IMAGING  Nuclear Stress Test    The EKG portion of this study is negative for ischemia.    The patient reported no chest pain during the stress test.    During stress, rare PVCs are noted.    The nuclear portion of this study will be reported separately.  Echo  · The left ventricle is normal in size with concentric remodeling and low   normal systolic function.  · The estimated ejection fraction is 50%.  · Normal left ventricular diastolic function.  · There is mild left ventricular global hypokinesis.  · Normal right ventricular size with normal right ventricular systolic   function.  · Normal central venous pressure (3 mmHg).  · The estimated PA systolic pressure is 24 mmHg.     NM Myocardial Perfusion Spect Multi Pharmacologic  Narrative: EXAMINATION:  NM MYOCARDIAL PERFUSION SPECT MULTI PHARM    CLINICAL HISTORY:  Chest pain/anginal equiv, high CAD risk, not treadmill candidate;    TECHNIQUE:  SPECT images in short, vertical and horizontal long axis were acquired 30 minutes after the injection of 11.8 mCi of Tc-99m tetrofosmin at rest and 33 mCi during a cardiac stress. The clinical stress and ECG portion of the study is to be read separately.    COMPARISON:  None.    FINDINGS:  The quality of the study is good.    Stress SPECT images demonstrate a large focus of absent tracer accumulation in the lateral wall extending from the anterior to the inferior wall and from the apex FPC to the base.  This large defect is unchanged on the rest images without reversible ischemia demonstrated.  This is an unusual pattern distribution.    The gated post-stress images reveal mildly impaired wall motion and diminished with an  estimated LVEF of 50 %. The LV cavity is not dilated with an end-diastolic volume of 99 ml and an end-systolic volume of 50 ml.  Impression: There is a large defect in tracer accumulation in the lateral wall of the left ventricle extending from the anterior to the inferior wall and from the apex half way to the base on both the stress and rest imaging.  This is consistent with a myocardial infarction without reversible ischemia.    There is mild decreased ejection fraction at 50% without left ventricular dilation.    Electronically signed by: Emre Laguna MD  Date:    07/29/2022  Time:    11:29      [facility]  Results for orders placed during the hospital encounter of 07/28/22    Echo    Interpretation Summary  · The left ventricle is normal in size with concentric remodeling and low normal systolic function.  · The estimated ejection fraction is 50%.  · Normal left ventricular diastolic function.  · There is mild left ventricular global hypokinesis.  · Normal right ventricular size with normal right ventricular systolic function.  · Normal central venous pressure (3 mmHg).  · The estimated PA systolic pressure is 24 mmHg.      CURRENT/PREVIOUS VISIT EKG  Results for orders placed or performed during the hospital encounter of 07/28/22   EKG 12-lead    Collection Time: 07/28/22  8:11 PM    Narrative    Test Reason : R07.89,    Vent. Rate : 059 BPM     Atrial Rate : 059 BPM     P-R Int : 162 ms          QRS Dur : 086 ms      QT Int : 402 ms       P-R-T Axes : 034 011 071 degrees     QTc Int : 397 ms    Sinus bradycardia  Possible Lateral infarct ,age undetermined  Abnormal ECG  No previous ECGs available  Confirmed by Zeb Torres MD ([id number]) on 7/29/2022 4:23:24 PM    Referred By: [email or web address]   SELF           Confirmed By:Zeb Torres MD       ASSESSMENT/PLAN:     Active Hospital Problems    Diagnosis    New onset of congestive heart failure    HTN (hypertension)    HLD (hyperlipidemia)    CAD (coronary  artery disease)    Gout of foot     Last Assessment & Plan:   Formatting of this note might be different from the original.  I shared some educational material about gout today      History of coronary artery bypass graft     Formatting of this note might be different from the original.  In [place], [place] at [facility]  [address]    Last Assessment & Plan:   Formatting of this note might be different from the original.  We discussed weight loss.   1. Make healthy food choices - no sweets, lots of fruit and veg.   2. Eat smaller quantities of food. Don't eat if you are not hungry.  3. No late night eating  4. No juice or soda    I will refill your medicines  Please restart the bp medications  I also will refer you to our cardiologists at [place] to establish care.      Chronic bilateral low back pain without sciatica     Last Assessment & Plan:   Formatting of this note might be different from the original.  54 y.o. male presents with ongoing, intermittent low back pain, unalleviated by Tylenol as prescribed by PCP at visit 1/15/2020. Patient has appointment with Physical Therapy 2/6/2020. Advised on use of ice and heat at site, prescribed muscle relaxant for use at bedtime. RTC prn.         ASSESSMENT & PLAN:   1. Gout with markedly increased uric acid level.  The patient is been treated for it with slow improvement.  2. Coronary artery disease with previous coronary artery bypass and percutaneous revascularization he had an episodes of chest tightness with no troponin elevation, no EKG changes the myocardial perfusion scan showed no evidence of ischemia.  Continue sublingual nitroglycerin p.r.n..  3. Hypercholesterolemia agree with initiating therapy with a statin.      RECOMMENDATIONS:  Continue treatment with baby aspirin, beta-blockers, ARB, a statin, sublingual nitroglycerin p.r.n..  Continue therapy you for his gout.  If he continues to have symptoms of chest tightness consider further cardiac evaluation  this has been discussed with the patient.  He can be follow as an outpatient        Jameel Hinton MD  Department of Cardiology  Date of Service: 07/30/2022  9:46 AM

## 2022-07-30 NOTE — PLAN OF CARE
Problem: Chest Pain  Goal: Resolution of Chest Pain Symptoms  Outcome: Ongoing, Progressing     Pt denies chest pain. VSS. Pt ready for discharge. IV discontinued. Tele discontinued.

## 2022-07-30 NOTE — PLAN OF CARE
Pt is cleared from  for discharge.       07/30/22 2033   Final Note   Assessment Type Final Discharge Note   Anticipated Discharge Disposition Home   Hospital Resources/Appts/Education Provided   (pt need to f/u with a PCP)

## 2022-07-30 NOTE — DISCHARGE INSTRUCTIONS
Please follow up with Primary Care and Cardiology as we discussed.    Discharge Instructions, [facility]    Thank you for choosing [facility] for your medical care. The primary provider who is taking care of you at the time of your discharge is Allison Patel NP    You were admitted to the hospital with CAD (coronary artery disease).     Please note your discharge instructions, including diet/activity restrictions, follow-up appointments, and medication changes.  If you have any questions about your medical issues, prescriptions, or any other questions, please feel free to contact the [facility] Medicine Dept at [phone] and we will help.    If you are previously with Home health, outpatient PT/OT or under a therapy program, you are cleared to return to those programs.    Please direct all long term medication refills and follow up to your primary care provider, Primary Doctor Sneha  Thank you again for letting us take care of your health care needs.    Please note the following discharge instructions per your discharging provider -  Allison Patel NP ru

## 2022-07-30 NOTE — PLAN OF CARE
Problem: Adult Inpatient Plan of Care  Goal: Optimal Comfort and Wellbeing  Outcome: Ongoing, Progressing     Problem: Chest Pain  Goal: Resolution of Chest Pain Symptoms  Outcome: Ongoing, Progressing

## 2022-07-30 NOTE — NURSING
Spoke with pt in depth regarding safety policies due to new medications, new health diagnosis, fall risk.  Pt ambulating in his room, denies dizziness.  States the bed alarm makes him very uncomfortable.  Refusing bed alarm during the night.  House supervisor, Vasu, notified.    Update 2004:  Vasu spoke with pt. Still refusing bed alarm.

## 2022-07-30 NOTE — NURSING
Notified by monitor room pt had a 7 beat run followed by a 12 beat run of vtach, VSS pt asymptomatic. Np notified. O2 applied.

## 2022-07-30 NOTE — NURSING
Pt arrived to 205, @2030. Pt educated on safety precautions and refuses to us the bed alarm at this time. Notifed charge nurse.

## 2022-07-31 NOTE — HOSPITAL COURSE
Patient was admitted with left-sided chest pain and acute gout attack.  EKG was obtained, troponins were trended, and patient had chest x-ray which revealed mild pulmonary edema.  He was placed on CHF pathway and Cardiology was consulted.  His troponins remained negative.  Echocardiogram was obtained and did not noted any structural defects or heart failure.  Patient underwent nuclear stress testing.  He was placed on aspirin and Lipitor.  He was monitored on continuous telemetry.  His vital signs were trended closely.  He was initiated on nitropaste for his HTN.  For his gout he was placed on steroids and colchicine.  He was given oral narcotics for his pain.  He was cleared from cardiology perspective for discharge.  Discharge instructions with return precautions were discussed with patient with good understanding.    Physical exam:  Awake alert oriented x4, no acute distress  Cardiac:  RRR  Pulmonary:  Clear to auscultation  Abdomen:  Soft, nontender  Extremities: normal range of motion

## 2022-08-11 ENCOUNTER — TELEPHONE (OUTPATIENT)
Dept: CARDIOLOGY | Facility: CLINIC | Age: 57
End: 2022-08-11

## 2022-08-11 NOTE — TELEPHONE ENCOUNTER
----- Message from Amna Petersen MA sent at 8/11/2022  2:25 PM CDT -----  Contact: CARMELO DANIEL [[id number]]  Type: Needs Medical Advice    Who Called: CARMELO DANIEL [[id number]]  Best Call Back Number: [phone]  Inquiry/Question: Please call regarding hospital follow up appt      Thank you~

## 2022-08-18 ENCOUNTER — OFFICE VISIT (OUTPATIENT)
Dept: PODIATRY | Facility: CLINIC | Age: 57
End: 2022-08-18
Payer: COMMERCIAL

## 2022-08-18 VITALS — WEIGHT: 178.5 LBS | HEART RATE: 63 BPM | OXYGEN SATURATION: 95 % | HEIGHT: 70 IN | BODY MASS INDEX: 25.56 KG/M2

## 2022-08-18 DIAGNOSIS — M25.571 ACUTE BILATERAL ANKLE PAIN: ICD-10-CM

## 2022-08-18 DIAGNOSIS — M10.071 ACUTE IDIOPATHIC GOUT OF RIGHT FOOT: Primary | ICD-10-CM

## 2022-08-18 DIAGNOSIS — M10.072 ACUTE IDIOPATHIC GOUT OF LEFT ANKLE: ICD-10-CM

## 2022-08-18 DIAGNOSIS — M79.671 RIGHT FOOT PAIN: ICD-10-CM

## 2022-08-18 DIAGNOSIS — M25.572 ACUTE BILATERAL ANKLE PAIN: ICD-10-CM

## 2022-08-18 DIAGNOSIS — M10.071 ACUTE IDIOPATHIC GOUT OF RIGHT ANKLE: ICD-10-CM

## 2022-08-18 PROCEDURE — 3044F HG A1C LEVEL LT 7.0%: CPT | Mod: CPTII,S$GLB,, | Performed by: PODIATRIST

## 2022-08-18 PROCEDURE — 1111F PR DISCHARGE MEDS RECONCILED W/ CURRENT OUTPATIENT MED LIST: ICD-10-PCS | Mod: CPTII,S$GLB,, | Performed by: PODIATRIST

## 2022-08-18 PROCEDURE — 3044F PR MOST RECENT HEMOGLOBIN A1C LEVEL <7.0%: ICD-10-PCS | Mod: CPTII,S$GLB,, | Performed by: PODIATRIST

## 2022-08-18 PROCEDURE — 4010F PR ACE/ARB THEARPY RXD/TAKEN: ICD-10-PCS | Mod: CPTII,S$GLB,, | Performed by: PODIATRIST

## 2022-08-18 PROCEDURE — 99204 OFFICE O/P NEW MOD 45 MIN: CPT | Mod: S$GLB,,, | Performed by: PODIATRIST

## 2022-08-18 PROCEDURE — 1111F DSCHRG MED/CURRENT MED MERGE: CPT | Mod: CPTII,S$GLB,, | Performed by: PODIATRIST

## 2022-08-18 PROCEDURE — 3008F PR BODY MASS INDEX (BMI) DOCUMENTED: ICD-10-PCS | Mod: CPTII,S$GLB,, | Performed by: PODIATRIST

## 2022-08-18 PROCEDURE — 1160F PR REVIEW ALL MEDS BY PRESCRIBER/CLIN PHARMACIST DOCUMENTED: ICD-10-PCS | Mod: CPTII,S$GLB,, | Performed by: PODIATRIST

## 2022-08-18 PROCEDURE — 1159F MED LIST DOCD IN RCRD: CPT | Mod: CPTII,S$GLB,, | Performed by: PODIATRIST

## 2022-08-18 PROCEDURE — 3008F BODY MASS INDEX DOCD: CPT | Mod: CPTII,S$GLB,, | Performed by: PODIATRIST

## 2022-08-18 PROCEDURE — 4010F ACE/ARB THERAPY RXD/TAKEN: CPT | Mod: CPTII,S$GLB,, | Performed by: PODIATRIST

## 2022-08-18 PROCEDURE — 99204 PR OFFICE/OUTPT VISIT, NEW, LEVL IV, 45-59 MIN: ICD-10-PCS | Mod: S$GLB,,, | Performed by: PODIATRIST

## 2022-08-18 PROCEDURE — 1159F PR MEDICATION LIST DOCUMENTED IN MEDICAL RECORD: ICD-10-PCS | Mod: CPTII,S$GLB,, | Performed by: PODIATRIST

## 2022-08-18 PROCEDURE — 1160F RVW MEDS BY RX/DR IN RCRD: CPT | Mod: CPTII,S$GLB,, | Performed by: PODIATRIST

## 2022-08-18 RX ORDER — ALLOPURINOL 300 MG/1
300 TABLET ORAL DAILY
Qty: 30 TABLET | Refills: 1 | Status: SHIPPED | OUTPATIENT
Start: 2022-08-18 | End: 2022-09-20 | Stop reason: SDUPTHER

## 2022-08-18 RX ORDER — HYDROCODONE BITARTRATE AND ACETAMINOPHEN 5; 325 MG/1; MG/1
1 TABLET ORAL EVERY 6 HOURS PRN
Qty: 28 TABLET | Refills: 0 | Status: SHIPPED | OUTPATIENT
Start: 2022-08-18 | End: 2022-09-20 | Stop reason: SDUPTHER

## 2022-08-18 RX ORDER — METHYLPREDNISOLONE 4 MG/1
TABLET ORAL
Qty: 1 EACH | Refills: 0 | Status: SHIPPED | OUTPATIENT
Start: 2022-08-18 | End: 2022-09-01

## 2022-08-18 NOTE — PROGRESS NOTES
"  [address] [facility] [address]  [place] [address]  Phone: [phone]   [phone]    Patient's PCP:Primary Doctor No  Referring Provider: Aaareferral Self    Subjective:      Chief Complaint:: Foot Pain (Right)    SHANDRA Richard is a 57 y.o. male who presents today with a complaint of pain in right 1st toe joint and underneath - possible right foot gout lasting for about 2 days this round. Onset of symptoms unknown and reports no trauma.  Current symptoms include warm, painful.  Aggravating factors are painful to bend the toe, weight-bearing. Symptoms have worsened. Treatment to date have included CBD ointment/cream around the foot and ankle, going to the ER on 07/28 and prescribed Hydrocodone, Medrol Dosepack, ordered a Uric Acid which was 11.3.  He has completed the medication - states the medication did help improve the symptoms.  Since he has completed the medication, the symptoms have returned.      He has a history of gout.  Started about 1 year ago and has gotten worse in the last year.  Flare-ups have been off and on.    Now bilateral ankles are painful.        Vitals:    08/18/22 1441   Pulse: 63   SpO2: 95%   Weight: 81 kg (178 lb 8 oz)   Height: 5' 10" (1.778 m)   PainSc:   7      Shoe Size: 9    Past Surgical History:   Procedure Laterality Date    CORONARY ARTERY BYPASS GRAFT       Past Medical History:   Diagnosis Date    Coronary artery disease     COVID-19     Gout     Hyperlipidemia     Hypertension      Family History   Problem Relation Age of Onset    Heart disease Father         Social History:   Marital Status: Single  Alcohol History:  reports previous alcohol use.  Tobacco History:  reports that he has quit smoking. He has never used smokeless tobacco.  Drug History:  has no history on file for drug use.    Review of patient's allergies indicates:  No Known Allergies    Current Outpatient Medications   Medication Sig Dispense Refill    aspirin 81 MG Chew Take 1 tablet (81 mg " total) by mouth once daily. 90 tablet 0    atorvastatin (LIPITOR) 40 MG tablet Take 1 tablet (40 mg total) by mouth once daily. 90 tablet 0    ibuprofen (ADVIL,MOTRIN) 600 MG tablet Take 1 tablet (600 mg total) by mouth every 6 (six) hours as needed for Pain. 20 tablet 0    indomethacin (INDOCIN) 50 MG capsule Take 1 capsule (50 mg total) by mouth 3 (three) times daily with meals. 15 capsule 0    losartan (COZAAR) 25 MG tablet Take 1 tablet (25 mg total) by mouth once daily. 90 tablet 0    metoprolol succinate (TOPROL-XL) 100 MG 24 hr tablet Take 1 tablet (100 mg total) by mouth once daily. 90 tablet 0    nitroGLYCERIN (NITROSTAT) 0.4 MG SL tablet Place 1 tablet (0.4 mg total) under the tongue every 5 (five) minutes as needed for Chest pain. 15 tablet 0    allopurinoL (ZYLOPRIM) 300 MG tablet Take 1 tablet (300 mg total) by mouth once daily. 30 tablet 1    HYDROcodone-acetaminophen (NORCO) 5-325 mg per tablet Take 1 tablet by mouth every 6 (six) hours as needed for Pain. 28 tablet 0    methylPREDNISolone (MEDROL DOSEPACK) 4 mg tablet use as directed 1 each 0     Current Facility-Administered Medications   Medication Dose Route Frequency Provider Last Rate Last Admin    acetaminophen tablet 650 mg  650 mg Oral Once PRN Cristina Hammond MD        albuterol inhaler 2 puff  2 puff Inhalation Q20 Min PRN Cristina Hammond MD           Review of Systems   Constitutional: Negative for chills, fatigue, fever and unexpected weight change.   HENT: Negative for hearing loss and trouble swallowing.    Eyes: Negative for photophobia and visual disturbance.   Respiratory: Negative for cough, shortness of breath and wheezing.    Cardiovascular: Negative for chest pain, palpitations and leg swelling.   Gastrointestinal: Negative for abdominal pain and nausea.   Genitourinary: Negative for dysuria and frequency.   Musculoskeletal: Positive for arthralgias, gait problem and joint swelling. Negative for back pain and  myalgias.   Skin: Positive for color change. Negative for rash and wound.   Neurological: Negative for tremors, seizures, weakness, numbness and headaches.   Hematological: Does not bruise/bleed easily.         Objective:        Physical Exam:   Foot Exam    General  Orientation: alert and oriented to person, place, and time   Affect: appropriate   Gait: unimpaired       Right Foot/Ankle     Inspection and Palpation  Ecchymosis: none  Tenderness: great toe metatarsophalangeal joint and ankle   Swelling: great toe metatarsophalangeal joint and ankle   Arch: normal  Hallux valgus: yes  Skin Exam: skin intact; no drainage, no ulcer and no erythema   Neurovascular  Dorsalis pedis: 2+  Posterior tibial: 2+  Capillary Refill: 2+  Varicose veins: not present  Saphenous nerve sensation: normal  Tibial nerve sensation: normal  Superficial peroneal nerve sensation: normal  Deep peroneal nerve sensation: normal  Sural nerve sensation: normal    Edema  Type of edema: non-pitting    Muscle Strength  Ankle dorsiflexion: 5  Ankle plantar flexion: 5  Ankle inversion: 5  Ankle eversion: 5  Great toe extension: 5  Great toe flexion: 5    Range of Motion    Normal right ankle ROM  Passive  Ankle dorsiflexion: pain  Ankle plantar flexion: pain  1st MTP extension: pain  1st MTP flexion: pain      Tests  Anterior drawer: negative   Talar tilt: negative   PT Tinel's sign: negative    Paresthesia: negative    Left Foot/Ankle      Inspection and Palpation  Ecchymosis: none  Tenderness: ankle   Swelling: none and ankle   Arch: normal  Hallux valgus: yes  Skin Exam: skin intact; no drainage, no ulcer and no erythema   Neurovascular  Dorsalis pedis: 2+  Posterior tibial: 2+  Capillary refill: 2+  Varicose veins: not present  Saphenous nerve sensation: normal  Tibial nerve sensation: normal  Superficial peroneal nerve sensation: normal  Deep peroneal nerve sensation: normal  Sural nerve sensation: normal    Edema  Type of edema:  non-pitting    Muscle Strength  Ankle dorsiflexion: 5  Ankle plantar flexion: 5  Ankle inversion: 5  Ankle eversion: 5  Great toe extension: 5  Great toe flexion: 5    Range of Motion    Normal left ankle ROM  Passive  Ankle dorsiflexion: pain  Plantar flexion: pain      Tests  Anterior drawer: negative   Talar tilt: negative   PT Tinel's sign: negative  Paresthesia: negative        Physical Exam  Cardiovascular:      Pulses:           Dorsalis pedis pulses are 2+ on the right side and 2+ on the left side.        Posterior tibial pulses are 2+ on the right side and 2+ on the left side.   Musculoskeletal:      Right ankle: Swelling present. Tenderness present.      Left ankle: Swelling present. Tenderness present.      Right foot: Bunion present.      Left foot: Bunion present.   Feet:      Right foot:      Skin integrity: No ulcer or erythema.      Left foot:      Skin integrity: No ulcer or erythema.               Right Ankle/Foot Exam     Swelling   The patient is swollen on the great toe metatarsophalangeal joint.    Tenderness   The patient is tender to palpation of the great toe metatarsophalangeal joint.    Range of Motion   The patient has normal right ankle ROM.    Left Ankle/Foot Exam     Range of Motion   The patient has normal left ankle ROM.       Muscle Strength   Right Lower Extremity   Ankle Dorsiflexion:  5   Plantar flexion:  5/5  Left Lower Extremity   Ankle Dorsiflexion:  5   Plantar flexion:  5/5     Vascular Exam     Right Pulses  Dorsalis Pedis:      2+  Posterior Tibial:      2+        Left Pulses  Dorsalis Pedis:      2+  Posterior Tibial:      2+             Imaging: none          Assessment:       1. Acute idiopathic gout of right foot    2. Acute idiopathic gout of left ankle    3. Acute idiopathic gout of right ankle    4. Right foot pain    5. Acute bilateral ankle pain      Plan:   Acute idiopathic gout of right foot  -     allopurinoL (ZYLOPRIM) 300 MG tablet; Take 1 tablet (300 mg  total) by mouth once daily.  Dispense: 30 tablet; Refill: 1  -     methylPREDNISolone (MEDROL DOSEPACK) 4 mg tablet; use as directed  Dispense: 1 each; Refill: 0  -     HYDROcodone-acetaminophen (NORCO) 5-325 mg per tablet; Take 1 tablet by mouth every 6 (six) hours as needed for Pain.  Dispense: 28 tablet; Refill: 0  -     Uric acid; Future; Expected date: 08/18/2022    Acute idiopathic gout of left ankle  -     allopurinoL (ZYLOPRIM) 300 MG tablet; Take 1 tablet (300 mg total) by mouth once daily.  Dispense: 30 tablet; Refill: 1  -     methylPREDNISolone (MEDROL DOSEPACK) 4 mg tablet; use as directed  Dispense: 1 each; Refill: 0  -     HYDROcodone-acetaminophen (NORCO) 5-325 mg per tablet; Take 1 tablet by mouth every 6 (six) hours as needed for Pain.  Dispense: 28 tablet; Refill: 0  -     Uric acid; Future; Expected date: 08/18/2022    Acute idiopathic gout of right ankle  -     allopurinoL (ZYLOPRIM) 300 MG tablet; Take 1 tablet (300 mg total) by mouth once daily.  Dispense: 30 tablet; Refill: 1  -     methylPREDNISolone (MEDROL DOSEPACK) 4 mg tablet; use as directed  Dispense: 1 each; Refill: 0  -     HYDROcodone-acetaminophen (NORCO) 5-325 mg per tablet; Take 1 tablet by mouth every 6 (six) hours as needed for Pain.  Dispense: 28 tablet; Refill: 0  -     Uric acid; Future; Expected date: 08/18/2022    Right foot pain  -     allopurinoL (ZYLOPRIM) 300 MG tablet; Take 1 tablet (300 mg total) by mouth once daily.  Dispense: 30 tablet; Refill: 1  -     methylPREDNISolone (MEDROL DOSEPACK) 4 mg tablet; use as directed  Dispense: 1 each; Refill: 0  -     HYDROcodone-acetaminophen (NORCO) 5-325 mg per tablet; Take 1 tablet by mouth every 6 (six) hours as needed for Pain.  Dispense: 28 tablet; Refill: 0  -     Uric acid; Future; Expected date: 08/18/2022    Acute bilateral ankle pain  -     allopurinoL (ZYLOPRIM) 300 MG tablet; Take 1 tablet (300 mg total) by mouth once daily.  Dispense: 30 tablet; Refill: 1  -      methylPREDNISolone (MEDROL DOSEPACK) 4 mg tablet; use as directed  Dispense: 1 each; Refill: 0  -     HYDROcodone-acetaminophen (NORCO) 5-325 mg per tablet; Take 1 tablet by mouth every 6 (six) hours as needed for Pain.  Dispense: 28 tablet; Refill: 0  -     Uric acid; Future; Expected date: 08/18/2022      Follow up if symptoms worsen or fail to improve.    Procedures        Discussed with the patient findings of acute on chronic gout of his bilateral feet.  I explained that his uric acid level is significantly elevated.  I am going to send in a Medrol Dosepak and Norco for the acute attack.  I am also going to start him on allopurinol 300 mg daily.  I am also placing an order for a uric acid level and instructed the patient to obtain this proximally 4 weeks.  Also discussed the patient that if his symptoms are not improving over the next week then he should return for possible intra-articular steroid injection.    Counseling:     I provided patient education verbally regarding:   Patient diagnosis, treatment options, as well as alternatives, risks, and benefits.     We discussed causes of gout, diet for gout and oral medication for gout.  I discussed lowering the uric acid level to below 6 and as close to 5.5 to stop uric acid crystal formation.      This note was created using Dragon voice recognition software that occasionally misinterpreted phrases or words.

## 2022-08-18 NOTE — PATIENT INSTRUCTIONS
"What Is Gout?  Gout is a disease that affects the joints. Left untreated, it can lead to painful foot and joint deformities and even kidney problems. But, by treating gout early, you can relieve pain and help prevent future problems. Gout can usually be treated with medication and proper diet. In severe cases, surgery may be needed.    What causes gout?  Gout is caused by an excess of uric acid (a waste product made by the body). Uric acid is excreted by the kidneys. If the uric acid level in your blood rises too high, the uric acid may form crystals that collect in the joints, bringing on a gout attack. If you have many gout attacks, crystals may form large deposits called tophi. Tophi can damage joints and cause deformity.    Who is at risk for gout?  Men are more likely to have gout than women. But women can also be affected, mostly after menopause. Some health problems, such as obesity and high cholesterol, make gout more likely. And some medications, such as diuretics (water pills), can trigger a gout attack. People who drink a lot of alcohol are at high risk for gout. Certain foods can also trigger a gout attack.    Substances that may trigger a gout attack  To help prevent a gout attack, avoid these foods:  Alcohol (particularly beer, but also red wine and spirits)  Certain meats (red meat, processed meat, turkey)  Organ meats (kidney, liver, sweetbread)  Shellfish (lobster, crab, shrimp, scallop, mussel)  Certain fish (anchovy, sardine, herring, mackerel)     Treatment  Lifestyle changes, including weight loss, exercise, and quitting tobacco use  Reducing consumption of the food groups above as well as high fructose corn syrup, found in many foods including sodas and energy drinks  Changing non-essential medications that may contribute to gout (such as thiazide diuretics--"water pills")  Medications to reduce the amount of uric acid in the blood, such as allopurinol or others  Medications to treat acute " gout attacks, including NSAIDs (nonsteroidal anti-inflammatory medicines), steroids, and colchicine    Date Last Reviewed: 2/1/2016  © 5872-9542 [employer]. [address] [place] [address], [place] [address]. All rights reserved. This information is not intended as a substitute for professional medical care. Always follow your healthcare professional's instructions.       Gout Diet  Gout is a painful condition caused by an excess of uric acid, a waste product made by the body. Uric acid forms crystals that collect in the joints. The immune response to these crystals brings on symptoms of joint pain and swelling. This is called a gout attack. Often, medications and diet changes are combined to manage gout. Below are some guidelines for changing your diet to help you manage gout and prevent attacks. Your health care provider will help you determine the best eating plan for you.     Eating to manage gout  Weight loss for those who are overweight may help reduce gout attacks.    Eat less of these foods  Eating too many foods containing purines may raise the levels of uric acid in your body. This raises your risk for a gout attack. Try to limit these foods and drinks:  Alcohol, such as beer and red wine. You may be told to avoid alcohol completely.  Soft drinks that contain sugar or high fructose corn syrup  Certain fish, including anchovies, sardines, fish eggs, and herring  Shellfish  Certain meats, such as red meat, hot dogs, luncheon meats, and turkey  Organ meats, such as liver, kidneys, and sweetbreads  Legumes, such as dried beans and peas  Other high fat foods such as gravy, whole milk, and high fat cheeses  Vegetables such as asparagus, cauliflower, spinach, and mushrooms used to be thought to contribute to an increased risk for a gout attack, but recent studies show that high purine vegetables don't increase the risk for a gout attack.    Eat more of these foods  Other foods may be helpful for people with  gout. Add some of these foods to your diet:  Cherries contain chemicals that may lower uric acid.  Omega fatty acids. These are found in some fatty fish such as salmon, certain oils (flax, olive, or nut), and nuts themselves. Omega fatty acids may help prevent inflammation due to gout.  Dairy products that are low-fat or fat-free, such as cheese and yogurt  Complex carbohydrate foods, including whole grains, brown rice, oats, and beans  Coffee, in moderation  Water, approximately 64 ounces per day    Follow-up care  Follow up with your healthcare provider as advised.    When to seek medical advice  Call your healthcare provider right away if any of these occur:  Return of gout symptoms, usually at night:  Severe pain, swelling, and heat in a joint, especially the base of the big toe  Affected joint is hard to move  Skin of the affected joint is purple or red  Fever of 100.4°F (38°C) or higher  Pain that doesn't get better even with prescribed medicine     Date Last Reviewed: 1/12/2016  © 3023-5166 [employer]. [address] [place] [address]. All rights reserved. This information is not intended as a substitute for professional medical care. Always follow your healthcare professional's instructions.

## 2022-08-23 ENCOUNTER — TELEPHONE (OUTPATIENT)
Dept: CARDIOLOGY | Facility: CLINIC | Age: 57
End: 2022-08-23
Payer: COMMERCIAL

## 2022-08-23 NOTE — TELEPHONE ENCOUNTER
----- Message from Emre Dickinson sent at 8/23/2022 11:00 AM CDT -----  Type:  Sooner Appointment Request    Caller is requesting a sooner appointment.  Caller declined first available appointment listed below.  Caller will not accept being placed on the waitlist and is requesting a message be sent to doctor.    Name of Caller:  Pt  When is the first available appointment?     Symptoms:  sharp pain shooting across heart, tightness in chest,     Best Call Back Number:  [phone]    Additional Information:  Please advise -- Thank you

## 2022-09-01 ENCOUNTER — OFFICE VISIT (OUTPATIENT)
Dept: CARDIOLOGY | Facility: CLINIC | Age: 57
End: 2022-09-01
Payer: COMMERCIAL

## 2022-09-01 VITALS
WEIGHT: 178.56 LBS | HEART RATE: 57 BPM | SYSTOLIC BLOOD PRESSURE: 116 MMHG | DIASTOLIC BLOOD PRESSURE: 68 MMHG | OXYGEN SATURATION: 98 % | HEIGHT: 70 IN | BODY MASS INDEX: 25.56 KG/M2

## 2022-09-01 DIAGNOSIS — Z95.1 HISTORY OF CORONARY ARTERY BYPASS GRAFT: Primary | ICD-10-CM

## 2022-09-01 DIAGNOSIS — I10 ESSENTIAL HYPERTENSION: ICD-10-CM

## 2022-09-01 DIAGNOSIS — E78.2 MIXED HYPERLIPIDEMIA: ICD-10-CM

## 2022-09-01 DIAGNOSIS — R07.9 CHEST PAIN, UNSPECIFIED TYPE: ICD-10-CM

## 2022-09-01 DIAGNOSIS — R94.39 ABNORMAL STRESS TEST: ICD-10-CM

## 2022-09-01 PROCEDURE — 93000 EKG 12-LEAD: ICD-10-PCS | Mod: S$GLB,,, | Performed by: SPECIALIST

## 2022-09-01 PROCEDURE — 99214 OFFICE O/P EST MOD 30 MIN: CPT | Mod: S$GLB,,, | Performed by: INTERNAL MEDICINE

## 2022-09-01 PROCEDURE — 3008F PR BODY MASS INDEX (BMI) DOCUMENTED: ICD-10-PCS | Mod: CPTII,S$GLB,, | Performed by: INTERNAL MEDICINE

## 2022-09-01 PROCEDURE — 4010F PR ACE/ARB THEARPY RXD/TAKEN: ICD-10-PCS | Mod: CPTII,S$GLB,, | Performed by: INTERNAL MEDICINE

## 2022-09-01 PROCEDURE — 93000 ELECTROCARDIOGRAM COMPLETE: CPT | Mod: S$GLB,,, | Performed by: SPECIALIST

## 2022-09-01 PROCEDURE — 3078F DIAST BP <80 MM HG: CPT | Mod: CPTII,S$GLB,, | Performed by: INTERNAL MEDICINE

## 2022-09-01 PROCEDURE — 99214 PR OFFICE/OUTPT VISIT, EST, LEVL IV, 30-39 MIN: ICD-10-PCS | Mod: S$GLB,,, | Performed by: INTERNAL MEDICINE

## 2022-09-01 PROCEDURE — 1159F MED LIST DOCD IN RCRD: CPT | Mod: CPTII,S$GLB,, | Performed by: INTERNAL MEDICINE

## 2022-09-01 PROCEDURE — 3044F PR MOST RECENT HEMOGLOBIN A1C LEVEL <7.0%: ICD-10-PCS | Mod: CPTII,S$GLB,, | Performed by: INTERNAL MEDICINE

## 2022-09-01 PROCEDURE — 1159F PR MEDICATION LIST DOCUMENTED IN MEDICAL RECORD: ICD-10-PCS | Mod: CPTII,S$GLB,, | Performed by: INTERNAL MEDICINE

## 2022-09-01 PROCEDURE — 3074F PR MOST RECENT SYSTOLIC BLOOD PRESSURE < 130 MM HG: ICD-10-PCS | Mod: CPTII,S$GLB,, | Performed by: INTERNAL MEDICINE

## 2022-09-01 PROCEDURE — 3078F PR MOST RECENT DIASTOLIC BLOOD PRESSURE < 80 MM HG: ICD-10-PCS | Mod: CPTII,S$GLB,, | Performed by: INTERNAL MEDICINE

## 2022-09-01 PROCEDURE — 4010F ACE/ARB THERAPY RXD/TAKEN: CPT | Mod: CPTII,S$GLB,, | Performed by: INTERNAL MEDICINE

## 2022-09-01 PROCEDURE — 1160F PR REVIEW ALL MEDS BY PRESCRIBER/CLIN PHARMACIST DOCUMENTED: ICD-10-PCS | Mod: CPTII,S$GLB,, | Performed by: INTERNAL MEDICINE

## 2022-09-01 PROCEDURE — 3008F BODY MASS INDEX DOCD: CPT | Mod: CPTII,S$GLB,, | Performed by: INTERNAL MEDICINE

## 2022-09-01 PROCEDURE — 1160F RVW MEDS BY RX/DR IN RCRD: CPT | Mod: CPTII,S$GLB,, | Performed by: INTERNAL MEDICINE

## 2022-09-01 PROCEDURE — 3044F HG A1C LEVEL LT 7.0%: CPT | Mod: CPTII,S$GLB,, | Performed by: INTERNAL MEDICINE

## 2022-09-01 PROCEDURE — 3074F SYST BP LT 130 MM HG: CPT | Mod: CPTII,S$GLB,, | Performed by: INTERNAL MEDICINE

## 2022-09-01 RX ORDER — SODIUM CHLORIDE 9 MG/ML
INJECTION, SOLUTION INTRAVENOUS ONCE
Status: CANCELLED | OUTPATIENT
Start: 2022-09-01 | End: 2022-09-01

## 2022-09-01 NOTE — PROGRESS NOTES
[facility] - Cardiology    Subjective:     Patient ID:  Irwin Richard is a 57 y.o. male patient here for evaluation New patient (Establish care . He had a quadruple by pass in 2016. He gets tightness around his heart and poking. )      HPI:  57-year-old male with history of coronary artery disease status post CABG and PCIs here to establish care.  Patient reports he has had chest tightness in the chest which lasts for minutes to hours, sometimes with rest, sometimes with exertion.  He had a stress test done about 2 months ago which showed no evidence of ischemia but did show a lateral infarct.  EF is 50%.    Review of Systems   All other systems reviewed and are negative.     Past Medical History:   Diagnosis Date    Coronary artery disease     COVID-19     Gout     Hyperlipidemia     Hypertension        Past Surgical History:   Procedure Laterality Date    CORONARY ARTERY BYPASS GRAFT         Family History   Problem Relation Age of Onset    Heart disease Father        Social History     Socioeconomic History    Marital status: Single   Tobacco Use    Smoking status: Former    Smokeless tobacco: Never   Substance and Sexual Activity    Alcohol use: Not Currently       Current Outpatient Medications   Medication Sig Dispense Refill    allopurinoL (ZYLOPRIM) 300 MG tablet Take 1 tablet (300 mg total) by mouth once daily. 30 tablet 1    aspirin 81 MG Chew Take 1 tablet (81 mg total) by mouth once daily. 90 tablet 0    atorvastatin (LIPITOR) 40 MG tablet Take 1 tablet (40 mg total) by mouth once daily. 90 tablet 0    HYDROcodone-acetaminophen (NORCO) 5-325 mg per tablet Take 1 tablet by mouth every 6 (six) hours as needed for Pain. 28 tablet 0    ibuprofen (ADVIL,MOTRIN) 600 MG tablet Take 1 tablet (600 mg total) by mouth every 6 (six) hours as needed for Pain. 20 tablet 0    losartan (COZAAR) 25 MG tablet Take 1 tablet (25 mg total) by mouth once daily. 90 tablet 0    metoprolol succinate (TOPROL-XL) 100 MG 24 hr tablet Take 1  tablet (100 mg total) by mouth once daily. 90 tablet 0    nitroGLYCERIN (NITROSTAT) 0.4 MG SL tablet Place 1 tablet (0.4 mg total) under the tongue every 5 (five) minutes as needed for Chest pain. 15 tablet 0     Current Facility-Administered Medications   Medication Dose Route Frequency Provider Last Rate Last Admin    acetaminophen tablet 650 mg  650 mg Oral Once PRN Cristina Hammond MD        albuterol inhaler 2 puff  2 puff Inhalation Q20 Min PRN Cristina Hammond MD           Review of patient's allergies indicates:  No Known Allergies      Objective:        Vitals:    09/01/22 0924   BP: 116/68   Pulse: (!) 57       Physical Exam  Vitals reviewed.   Constitutional:       Appearance: Normal appearance.   HENT:      Mouth/Throat:      Mouth: Mucous membranes are moist.   Eyes:      Extraocular Movements: Extraocular movements intact.      Pupils: Pupils are equal, round, and reactive to light.   Cardiovascular:      Rate and Rhythm: Normal rate and regular rhythm.      Pulses: Normal pulses.      Heart sounds: Normal heart sounds. No murmur heard.    No gallop.   Pulmonary:      Effort: Pulmonary effort is normal.      Breath sounds: Normal breath sounds.   Abdominal:      General: Bowel sounds are normal.      Palpations: Abdomen is soft.   Musculoskeletal:         General: Normal range of motion.      Cervical back: Normal range of motion.   Skin:     General: Skin is warm and dry.   Neurological:      General: No focal deficit present.      Mental Status: He is alert and oriented to person, place, and time.   Psychiatric:         Mood and Affect: Mood normal.       LIPIDS - LAST 2   Lab Results   Component Value Date    CHOL 202 (H) 07/29/2022    HDL 52 07/29/2022    LDLCALC 134.6 07/29/2022    TRIG 77 07/29/2022    CHOLHDL 25.7 07/29/2022       CBC - LAST 2  Lab Results   Component Value Date    WBC 7.58 07/30/2022    WBC 7.10 07/29/2022    RBC 4.66 07/30/2022    RBC 4.58 (L) 07/29/2022    HGB 14.1 07/30/2022     HGB 14.0 07/29/2022    HCT 42.6 07/30/2022    HCT 41.7 07/29/2022    MCV 91 07/30/2022    MCV 91 07/29/2022    MCH 30.3 07/30/2022    MCH 30.6 07/29/2022    MCHC 33.1 07/30/2022    MCHC 33.6 07/29/2022    RDW 11.5 07/30/2022    RDW 11.7 07/29/2022     07/30/2022     07/29/2022    MPV 10.0 07/30/2022    MPV 10.0 07/29/2022    GRAN 3.9 07/30/2022    GRAN 50.7 07/30/2022    LYMPH 2.7 07/30/2022    LYMPH 36.0 07/30/2022    MONO 0.7 07/30/2022    MONO 9.4 07/30/2022    BASO 0.03 07/30/2022    BASO 0.04 07/29/2022    NRBC 0 07/30/2022    NRBC 0 07/29/2022       CHEMISTRY & LIVER FUNCTION - LAST 2  Lab Results   Component Value Date     07/30/2022     07/28/2022    K 3.9 07/30/2022    K 3.7 07/28/2022     07/30/2022     07/28/2022    CO2 28 07/30/2022    CO2 26 07/28/2022    ANIONGAP 12 07/30/2022    ANIONGAP 9 07/28/2022    BUN 13 07/30/2022    BUN 12 07/28/2022    CREATININE 0.9 07/30/2022    CREATININE 1.0 07/28/2022    GLU 87 07/30/2022    GLU 74 07/28/2022    CALCIUM 9.2 07/30/2022    CALCIUM 9.3 07/28/2022    MG 2.1 07/29/2022    ALBUMIN 3.9 07/28/2022    PROT 7.4 07/28/2022    ALKPHOS 73 07/28/2022    ALT 12 07/28/2022    AST 15 07/28/2022    BILITOT 0.4 07/28/2022        CARDIAC PROFILE - LAST 2  Lab Results   Component Value Date     (H) 07/28/2022    TROPONINI <0.006 07/29/2022    TROPONINI 0.008 07/28/2022        COAGULATION - LAST 2  No results found for: LABPT, INR, APTT    ENDOCRINE & PSA - LAST 2  Lab Results   Component Value Date    HGBA1C 5.7 (H) 07/29/2022    TSH 3.808 07/29/2022        ECHOCARDIOGRAM RESULTS  Results for orders placed during the hospital encounter of 07/28/22    Echo    Interpretation Summary  · The left ventricle is normal in size with concentric remodeling and low normal systolic function.  · The estimated ejection fraction is 50%.  · Normal left ventricular diastolic function.  · There is mild left ventricular global hypokinesis.  ·  Normal right ventricular size with normal right ventricular systolic function.  · Normal central venous pressure (3 mmHg).  · The estimated PA systolic pressure is 24 mmHg.      CURRENT/PREVIOUS VISIT EKG  Results for orders placed or performed during the hospital encounter of 07/28/22   EKG 12-lead    Collection Time: 07/28/22  8:11 PM    Narrative    Test Reason : R07.89,    Vent. Rate : 059 BPM     Atrial Rate : 059 BPM     P-R Int : 162 ms          QRS Dur : 086 ms      QT Int : 402 ms       P-R-T Axes : 034 011 071 degrees     QTc Int : 397 ms    Sinus bradycardia  Possible Lateral infarct ,age undetermined  Abnormal ECG  No previous ECGs available  Confirmed by Zeb Torres MD ([id number]) on 7/29/2022 4:23:24 PM    Referred By: HONG   SELF           Confirmed By:Zeb Torres MD     No valid procedures specified.   Results for orders placed during the hospital encounter of 07/28/22    Nuclear Stress Test    Interpretation Summary    The EKG portion of this study is negative for ischemia.    The patient reported no chest pain during the stress test.    During stress, rare PVCs are noted.    The nuclear portion of this study will be reported separately.    No valid procedures specified.        Assessment:       1. History of coronary artery bypass graft    2. Chest pain, unspecified type    3. Abnormal stress test    4. Mixed hyperlipidemia    5. Essential hypertension             Plan:       History of coronary artery bypass graft  -     IN OFFICE EKG 12-LEAD (to [facility])  -     Case Request-Cath Lab: ANGIOGRAM, CORONARY, INCLUDING BYPASS GRAFT, WITH LEFT HEART CATHETERIZATION; Standing    Chest pain, unspecified type  -     Full code; Standing  -     Case Request-Cath Lab: ANGIOGRAM, CORONARY, INCLUDING BYPASS GRAFT, WITH LEFT HEART CATHETERIZATION; Standing  -     Height and weight; Standing  -     Insert 2 peripheral IVs; Standing  -     Void; Standing  -     Clip and Prep Right Radial, Right Groin, Left  Groin; Standing  -     Verify Modification of Diabetic Agents; Standing  -     Verify informed consent; Standing  -     Vital signs; Standing  -     Cardiac Monitoring - Adult; Standing  -     Pulse Oximetry Q4H; Standing  -     Diet NPO; Standing  -     CBC auto differential; Standing  -     Basic metabolic panel; Standing  -     EKG 12-lead; Standing    Abnormal stress test  -     Full code; Standing  -     Case Request-Cath Lab: ANGIOGRAM, CORONARY, INCLUDING BYPASS GRAFT, WITH LEFT HEART CATHETERIZATION; Standing  -     Height and weight; Standing  -     Insert 2 peripheral IVs; Standing  -     Void; Standing  -     Clip and Prep Right Radial, Right Groin, Left Groin; Standing  -     Verify Modification of Diabetic Agents; Standing  -     Verify informed consent; Standing  -     Vital signs; Standing  -     Cardiac Monitoring - Adult; Standing  -     Pulse Oximetry Q4H; Standing  -     Diet NPO; Standing  -     CBC auto differential; Standing  -     Basic metabolic panel; Standing  -     EKG 12-lead; Standing    Mixed hyperlipidemia    Essential hypertension    Other orders  -     0.9%  NaCl infusion  Unsure of the exact etiology of patient's chest pain.  Given abnormal stress test and features of angina like chest tightness, will proceed with invasive angiography and possible PCI.  Patient informed that given his atypical symptoms, there is a chance his symptoms may not improve with PCI.  For patient's last LDL was elevated to 137, he reports intermittent compliance, discussed importance of compliance with statins.  Patient will continue taking his statin, recommend annual lipid profile via primary care physician with a target LDL of around 70.  Hypertension is well controlled, continue with current therapy.  Continue with aspirin.  Patient reports he had a 4 vessel bypass.  Will try to get the records office bypass surgery as well as PCI since then from outside hospital.  Follow up in about 4 weeks (around  9/29/2022) for post angiogram.          Andrews Lopez MD  [facility] - Cardiology

## 2022-09-06 ENCOUNTER — HOSPITAL ENCOUNTER (OUTPATIENT)
Dept: PREADMISSION TESTING | Facility: HOSPITAL | Age: 57
Discharge: HOME | End: 2022-09-06
Attending: INTERNAL MEDICINE
Payer: COMMERCIAL

## 2022-09-06 VITALS — BODY MASS INDEX: 25.48 KG/M2 | WEIGHT: 178 LBS | HEIGHT: 70 IN

## 2022-09-06 DIAGNOSIS — R07.89 OTHER CHEST PAIN: ICD-10-CM

## 2022-09-06 DIAGNOSIS — R07.9 CHEST PAIN, UNSPECIFIED TYPE: ICD-10-CM

## 2022-09-06 DIAGNOSIS — M10.072 ACUTE IDIOPATHIC GOUT OF LEFT ANKLE: ICD-10-CM

## 2022-09-06 DIAGNOSIS — M79.671 RIGHT FOOT PAIN: ICD-10-CM

## 2022-09-06 DIAGNOSIS — R94.39 ABNORMAL STRESS TEST: ICD-10-CM

## 2022-09-06 DIAGNOSIS — M25.572 ACUTE BILATERAL ANKLE PAIN: ICD-10-CM

## 2022-09-06 DIAGNOSIS — M10.071 ACUTE IDIOPATHIC GOUT OF RIGHT FOOT: ICD-10-CM

## 2022-09-06 DIAGNOSIS — M25.571 ACUTE BILATERAL ANKLE PAIN: ICD-10-CM

## 2022-09-06 DIAGNOSIS — M10.071 ACUTE IDIOPATHIC GOUT OF RIGHT ANKLE: ICD-10-CM

## 2022-09-06 LAB
ANION GAP SERPL CALC-SCNC: 5 MMOL/L (ref 8–16)
BASOPHILS # BLD AUTO: 0.02 K/UL (ref 0–0.2)
BASOPHILS NFR BLD: 0.3 % (ref 0–1.9)
BUN SERPL-MCNC: 11 MG/DL (ref 6–20)
CALCIUM SERPL-MCNC: 9.4 MG/DL (ref 8.7–10.5)
CHLORIDE SERPL-SCNC: 106 MMOL/L (ref 95–110)
CO2 SERPL-SCNC: 29 MMOL/L (ref 23–29)
CREAT SERPL-MCNC: 0.9 MG/DL (ref 0.5–1.4)
DIFFERENTIAL METHOD: NORMAL
EOSINOPHIL # BLD AUTO: 0.2 K/UL (ref 0–0.5)
EOSINOPHIL NFR BLD: 3.1 % (ref 0–8)
ERYTHROCYTE [DISTWIDTH] IN BLOOD BY AUTOMATED COUNT: 11.7 % (ref 11.5–14.5)
EST. GFR  (NO RACE VARIABLE): >60 ML/MIN/1.73 M^2
GLUCOSE SERPL-MCNC: 86 MG/DL (ref 70–110)
HCT VFR BLD AUTO: 44.1 % (ref 40–54)
HGB BLD-MCNC: 14.2 G/DL (ref 14–18)
IMM GRANULOCYTES # BLD AUTO: 0.03 K/UL (ref 0–0.04)
IMM GRANULOCYTES NFR BLD AUTO: 0.4 % (ref 0–0.5)
LYMPHOCYTES # BLD AUTO: 2.9 K/UL (ref 1–4.8)
LYMPHOCYTES NFR BLD: 42.6 % (ref 18–48)
MCH RBC QN AUTO: 29.6 PG (ref 27–31)
MCHC RBC AUTO-ENTMCNC: 32.2 G/DL (ref 32–36)
MCV RBC AUTO: 92 FL (ref 82–98)
MONOCYTES # BLD AUTO: 0.7 K/UL (ref 0.3–1)
MONOCYTES NFR BLD: 10.9 % (ref 4–15)
NEUTROPHILS # BLD AUTO: 2.9 K/UL (ref 1.8–7.7)
NEUTROPHILS NFR BLD: 42.7 % (ref 38–73)
NRBC BLD-RTO: 0 /100 WBC
PLATELET # BLD AUTO: 203 K/UL (ref 150–450)
PMV BLD AUTO: 10 FL (ref 9.2–12.9)
POTASSIUM SERPL-SCNC: 4 MMOL/L (ref 3.5–5.1)
RBC # BLD AUTO: 4.8 M/UL (ref 4.6–6.2)
SODIUM SERPL-SCNC: 140 MMOL/L (ref 136–145)
URATE SERPL-MCNC: 3.9 MG/DL (ref 3.4–7)
WBC # BLD AUTO: 6.76 K/UL (ref 3.9–12.7)

## 2022-09-06 PROCEDURE — 80048 BASIC METABOLIC PNL TOTAL CA: CPT | Performed by: INTERNAL MEDICINE

## 2022-09-06 PROCEDURE — 93010 ELECTROCARDIOGRAM REPORT: CPT | Mod: ,,, | Performed by: INTERNAL MEDICINE

## 2022-09-06 PROCEDURE — 84550 ASSAY OF BLOOD/URIC ACID: CPT | Performed by: PODIATRIST

## 2022-09-06 PROCEDURE — 93005 ELECTROCARDIOGRAM TRACING: CPT | Performed by: INTERNAL MEDICINE

## 2022-09-06 PROCEDURE — 85025 COMPLETE CBC W/AUTO DIFF WBC: CPT | Performed by: INTERNAL MEDICINE

## 2022-09-06 PROCEDURE — 93010 EKG 12-LEAD: ICD-10-PCS | Mod: ,,, | Performed by: INTERNAL MEDICINE

## 2022-09-06 PROCEDURE — 36415 COLL VENOUS BLD VENIPUNCTURE: CPT | Performed by: INTERNAL MEDICINE

## 2022-09-06 NOTE — DISCHARGE INSTRUCTIONS
Nothing to eat or drink after midnight the night before your procedure.  Do not take any medications the morning of your procedure  Bring all your medications with you in the original pill bottles from pharmacy.  If you take blood thinners, ask your doctor if you should stop taking them.  Do not take metformin 24 hours prior to your procedure.  Adjust your insulin or other diabetes medications if needed.   Do your chlorhexidine wash the night before and morning of your procedure.  Make arrangements for someone you know to drive you home after your procedure. Taxi and Uber are not acceptable.

## 2022-09-08 ENCOUNTER — HOSPITAL ENCOUNTER (OUTPATIENT)
Facility: HOSPITAL | Age: 57
Discharge: HOME | End: 2022-09-08
Attending: INTERNAL MEDICINE | Admitting: INTERNAL MEDICINE
Payer: COMMERCIAL

## 2022-09-08 VITALS
OXYGEN SATURATION: 98 % | HEART RATE: 66 BPM | RESPIRATION RATE: 20 BRPM | DIASTOLIC BLOOD PRESSURE: 82 MMHG | SYSTOLIC BLOOD PRESSURE: 159 MMHG

## 2022-09-08 DIAGNOSIS — R94.39 ABNORMAL STRESS TEST: ICD-10-CM

## 2022-09-08 DIAGNOSIS — R07.9 CHEST PAIN, UNSPECIFIED TYPE: ICD-10-CM

## 2022-09-08 DIAGNOSIS — I25.10 CAD (CORONARY ARTERY DISEASE): ICD-10-CM

## 2022-09-08 PROCEDURE — 93459 L HRT ART/GRFT ANGIO: CPT | Performed by: INTERNAL MEDICINE

## 2022-09-08 PROCEDURE — 63600175 PHARM REV CODE 636 W HCPCS: Performed by: INTERNAL MEDICINE

## 2022-09-08 PROCEDURE — 25500020 PHARM REV CODE 255: Performed by: INTERNAL MEDICINE

## 2022-09-08 PROCEDURE — 99152 MOD SED SAME PHYS/QHP 5/>YRS: CPT | Performed by: INTERNAL MEDICINE

## 2022-09-08 PROCEDURE — 99152 PR MOD CONSCIOUS SEDATION, SAME PHYS, 5+ YRS, FIRST 15 MIN: ICD-10-PCS | Mod: ,,, | Performed by: INTERNAL MEDICINE

## 2022-09-08 PROCEDURE — 93459 L HRT ART/GRFT ANGIO: CPT | Mod: 26,,, | Performed by: INTERNAL MEDICINE

## 2022-09-08 PROCEDURE — 25000003 PHARM REV CODE 250: Performed by: INTERNAL MEDICINE

## 2022-09-08 PROCEDURE — C1760 CLOSURE DEV, VASC: HCPCS | Performed by: INTERNAL MEDICINE

## 2022-09-08 PROCEDURE — C1887 CATHETER, GUIDING: HCPCS | Performed by: INTERNAL MEDICINE

## 2022-09-08 PROCEDURE — 93459: ICD-10-PCS | Mod: 26,,, | Performed by: INTERNAL MEDICINE

## 2022-09-08 PROCEDURE — C1894 INTRO/SHEATH, NON-LASER: HCPCS | Performed by: INTERNAL MEDICINE

## 2022-09-08 PROCEDURE — 27201423 OPTIME MED/SURG SUP & DEVICES STERILE SUPPLY: Performed by: INTERNAL MEDICINE

## 2022-09-08 PROCEDURE — 99153 MOD SED SAME PHYS/QHP EA: CPT | Performed by: INTERNAL MEDICINE

## 2022-09-08 PROCEDURE — C1769 GUIDE WIRE: HCPCS | Performed by: INTERNAL MEDICINE

## 2022-09-08 PROCEDURE — 99152 MOD SED SAME PHYS/QHP 5/>YRS: CPT | Mod: ,,, | Performed by: INTERNAL MEDICINE

## 2022-09-08 DEVICE — ANGIO-SEAL VIP VASCULAR CLOSURE DEVICE
Type: IMPLANTABLE DEVICE | Site: GROIN | Status: FUNCTIONAL
Brand: ANGIO-SEAL

## 2022-09-08 RX ORDER — LIDOCAINE HYDROCHLORIDE 10 MG/ML
INJECTION INFILTRATION; PERINEURAL
Status: DISCONTINUED | OUTPATIENT
Start: 2022-09-08 | End: 2022-09-08 | Stop reason: HOSPADM

## 2022-09-08 RX ORDER — SODIUM CHLORIDE 9 MG/ML
INJECTION, SOLUTION INTRAVENOUS
Status: DISCONTINUED | OUTPATIENT
Start: 2022-09-08 | End: 2022-09-08 | Stop reason: HOSPADM

## 2022-09-08 RX ORDER — SODIUM CHLORIDE 9 MG/ML
INJECTION, SOLUTION INTRAVENOUS ONCE
Status: COMPLETED | OUTPATIENT
Start: 2022-09-08 | End: 2022-09-08

## 2022-09-08 RX ORDER — FENTANYL CITRATE 50 UG/ML
INJECTION, SOLUTION INTRAMUSCULAR; INTRAVENOUS
Status: DISCONTINUED | OUTPATIENT
Start: 2022-09-08 | End: 2022-09-08 | Stop reason: HOSPADM

## 2022-09-08 RX ORDER — MIDAZOLAM HYDROCHLORIDE 1 MG/ML
INJECTION INTRAMUSCULAR; INTRAVENOUS
Status: DISCONTINUED | OUTPATIENT
Start: 2022-09-08 | End: 2022-09-08 | Stop reason: HOSPADM

## 2022-09-08 RX ADMIN — SODIUM CHLORIDE: 0.9 INJECTION, SOLUTION INTRAVENOUS at 09:09

## 2022-09-08 NOTE — Clinical Note
The catheter was inserted into the ostial  left coronary artery. An angiography was performed of the left coronary arteries. Multiple views were taken. The angiography was performed via power injection. The injected amount was 8 mL contrast at 4 mL/s.

## 2022-09-08 NOTE — Clinical Note
100 ml of contrast were injected throughout the case. 25 mL of contrast was the total wasted during the case. 125 mL was the total amount used during the case.

## 2022-09-08 NOTE — Clinical Note
The catheter was repositioned into the ostial SVG graft. An angiography was performed of the graft. Multiple views were taken. The angiography was performed via power injection. The injected amount was 8 mL contrast at 4 mL/s.  SVG-DIAG

## 2022-09-08 NOTE — Clinical Note
The catheter was inserted into the ostial SVG graft. An angiography was performed of the graft. Multiple views were taken. The angiography was performed via power injection. The injected amount was 8 mL contrast at 4 mL/s.  SVG-RCA

## 2022-09-08 NOTE — Clinical Note
The catheter was removed from the and insertion attempt was made into the ostial LIMA graft. LIMA-LAD

## 2022-09-08 NOTE — PROGRESS NOTES
Dr. Lopez notified of patient's right groin dressing requiring areas of bright red blood to be marked x3 since arriving from the cath lab. Dr. Lopez assessed the site and states that there is no need for concern at this time. Will cont to monitor.

## 2022-09-08 NOTE — PROGRESS NOTES
Scant amount of new bright red blood noted outside of marked area. Notified Mike cath lab tech, and he states that they will come look at it.

## 2022-09-08 NOTE — Clinical Note
The site was marked. The groin and right radial was prepped. The site was prepped with ChloraPrep. The site was clipped. The patient was draped. The patient was positioned supine. The patient was secured using safety straps.

## 2022-09-08 NOTE — Clinical Note
The catheter was inserted into the left ventricle. Hemodynamics were performed.  and Pullback was recorded.

## 2022-09-08 NOTE — Clinical Note
The catheter was inserted into the ostial LIMA graft. An angiography was performed of the graft. Multiple views were taken. The angiography was performed via power injection. The injected amount was 8 mL contrast at 4 mL/s.  LIMA-LAD

## 2022-09-08 NOTE — Clinical Note
The catheter was repositioned into the ostium   right coronary artery. An angiography was performed of the right coronary arteries. Multiple views were taken. The angiography was performed via power injection. The injected amount was 6 mL contrast at 3 mL/s.

## 2022-09-08 NOTE — PROGRESS NOTES
Small amount of bright red blood noted on right groin dressing. Area marked, will cont to monitor.

## 2022-09-09 ENCOUNTER — TELEPHONE (OUTPATIENT)
Dept: CARDIOLOGY | Facility: CLINIC | Age: 57
End: 2022-09-09
Payer: COMMERCIAL

## 2022-09-09 NOTE — TELEPHONE ENCOUNTER
I called and spoke to this patient , he had an angiogram yesterday .   He is complaining of a clear runny nose not stopping. Also where his groin site is he is having excruciating pain .  Please advise. Thank you

## 2022-09-09 NOTE — TELEPHONE ENCOUNTER
----- Message from Nitesh Kahn sent at 9/9/2022  1:32 PM CDT -----  Type: Needs Medical Advice  Who Called: Pt   Symptoms (please be specific): pt in pain  How long has patient had these symptoms:    Pharmacy name and phone #:  [id number] - [place], [place] - [address]   Phone:  [phone]  Fax:  [phone]  Best Call Back Number: [phone]  Additional Information: Pt requesting a call back from the office, concerning him being in lots of pain, pt had a procedure on yesterday, and requesting advice and medication.

## 2022-09-20 ENCOUNTER — HOSPITAL ENCOUNTER (EMERGENCY)
Facility: HOSPITAL | Age: 57
Discharge: HOME | End: 2022-09-20
Attending: EMERGENCY MEDICINE
Payer: COMMERCIAL

## 2022-09-20 VITALS
BODY MASS INDEX: 25.54 KG/M2 | OXYGEN SATURATION: 98 % | RESPIRATION RATE: 18 BRPM | SYSTOLIC BLOOD PRESSURE: 138 MMHG | DIASTOLIC BLOOD PRESSURE: 83 MMHG | TEMPERATURE: 98 F | WEIGHT: 178 LBS | HEART RATE: 65 BPM

## 2022-09-20 DIAGNOSIS — M10.071 ACUTE IDIOPATHIC GOUT OF RIGHT ANKLE: ICD-10-CM

## 2022-09-20 DIAGNOSIS — M79.671 RIGHT FOOT PAIN: ICD-10-CM

## 2022-09-20 DIAGNOSIS — M10.072 ACUTE IDIOPATHIC GOUT OF LEFT ANKLE: ICD-10-CM

## 2022-09-20 DIAGNOSIS — M10.9 ACUTE GOUT INVOLVING TOE OF RIGHT FOOT, UNSPECIFIED CAUSE: Primary | ICD-10-CM

## 2022-09-20 DIAGNOSIS — M10.071 ACUTE IDIOPATHIC GOUT OF RIGHT FOOT: ICD-10-CM

## 2022-09-20 DIAGNOSIS — M25.571 ACUTE BILATERAL ANKLE PAIN: ICD-10-CM

## 2022-09-20 DIAGNOSIS — M25.572 ACUTE BILATERAL ANKLE PAIN: ICD-10-CM

## 2022-09-20 PROCEDURE — 99284 EMERGENCY DEPT VISIT MOD MDM: CPT

## 2022-09-20 RX ORDER — ALLOPURINOL 300 MG/1
300 TABLET ORAL DAILY
Qty: 30 TABLET | Refills: 1 | Status: SHIPPED | OUTPATIENT
Start: 2022-09-20 | End: 2022-11-19

## 2022-09-20 RX ORDER — HYDROCODONE BITARTRATE AND ACETAMINOPHEN 5; 325 MG/1; MG/1
1 TABLET ORAL EVERY 6 HOURS PRN
Qty: 28 TABLET | Refills: 0 | Status: SHIPPED | OUTPATIENT
Start: 2022-09-20 | End: 2022-10-27 | Stop reason: SDUPTHER

## 2022-09-20 RX ORDER — COLCHICINE 0.6 MG/1
0.6 TABLET ORAL DAILY
Qty: 3 TABLET | Refills: 0 | Status: SHIPPED | OUTPATIENT
Start: 2022-09-20 | End: 2022-09-29

## 2022-09-21 NOTE — FIRST PROVIDER EVALUATION
" Emergency Department TeleTriage Encounter Note      CHIEF COMPLAINT    Chief Complaint   Patient presents with    Foot Pain     R foot pain; Hx of gout; started hurting this AM       VITAL SIGNS   Initial Vitals [09/20/22 2153]   BP Pulse Resp Temp SpO2   138/83 65 18 97.6 °F (36.4 °C) 98 %      MAP       --            ALLERGIES    Review of patient's allergies indicates:  No Known Allergies    PROVIDER TRIAGE NOTE  This is a teletriage evaluation of a 57 y.o. male presenting to the ED with c/o "I have gout in my right foot and its killing me"       ROS: (-) fever, (-) rash  PE:  NAD    Initial orders will be placed and care will be transferred to an alternate provider when patient is roomed for a full evaluation. Any additional orders and the final disposition will be determined by that provider.         ORDERS  Labs Reviewed - No data to display    ED Orders (720h ago, onward)      None              Virtual Visit Note: The provider triage portion of this emergency department evaluation and documentation was performed via [employer], a HIPAA-compliant telemedicine application, in concert with a tele-presenter in the room. A face to face patient evaluation with one of my colleagues will occur once the patient is placed in an emergency department room.      DISCLAIMER: This note was prepared with CURRENT voice recognition transcription software. Garbled syntax, mangled pronouns, and other bizarre constructions may be attributed to that software system.    "

## 2022-09-21 NOTE — ED PROVIDER NOTES
Encounter Date: 9/20/2022       History     Chief Complaint   Patient presents with    Foot Pain     R foot pain; Hx of gout; started hurting this AM; recently ran out gout medication (2 days ago)     HPI 57-year-old man with a history of gout presents emergency department for several days pain in his right great toe that is now radiating to his ankle and became worse this morning.  States he was on colchicine, allopurinol and hydrocodone but recently ran out.  He does have an appointment established with Podiatry this Thursday.  Denies any fever.  Review of patient's allergies indicates:  No Known Allergies  Past Medical History:   Diagnosis Date    Coronary artery disease     COVID-19     Gout     Hyperlipidemia     Hypertension      Past Surgical History:   Procedure Laterality Date    CORONARY ANGIOGRAPHY INCLUDING BYPASS GRAFTS WITH CATHETERIZATION OF LEFT HEART N/A 9/8/2022    Procedure: ANGIOGRAM, CORONARY, INCLUDING BYPASS GRAFT, WITH LEFT HEART CATHETERIZATION;  Surgeon: Andrews Lopez MD;  Location: [facility] CATH/EP LAB;  Service: Cardiology;  Laterality: N/A;    CORONARY ARTERY BYPASS GRAFT       Family History   Problem Relation Age of Onset    Heart disease Father      Social History     Tobacco Use    Smoking status: Former    Smokeless tobacco: Never   Substance Use Topics    Alcohol use: Not Currently    Drug use: Never     Review of Systems   Constitutional:  Negative for fever.   HENT:  Negative for sore throat.    Respiratory:  Negative for shortness of breath.    Cardiovascular:  Negative for chest pain.   Gastrointestinal:  Negative for nausea.   Genitourinary:  Negative for dysuria.   Musculoskeletal:  Positive for arthralgias. Negative for back pain.   Skin:  Negative for rash.   Neurological:  Negative for weakness.   Hematological:  Does not bruise/bleed easily.     Physical Exam     Initial Vitals [09/20/22 2153]   BP Pulse Resp Temp SpO2   138/83 65 18 97.6 °F (36.4 °C) 98 %      MAP       --          Physical Exam    Nursing note and vitals reviewed.  Constitutional: He appears well-developed and well-nourished.   HENT:   Head: Normocephalic and atraumatic.   Eyes: EOM are normal. Pupils are equal, round, and reactive to light.   Neck: Neck supple.   Pulmonary/Chest: No respiratory distress.   Musculoskeletal:         General: Normal range of motion.      Cervical back: Neck supple.      Comments: Patient with tenderness over the 1st MTP of the right foot with mild warmth.  Mild swelling.  No erythema.  There is also some tenderness with range of motion the ankle without swelling, warmth or erythema.     Neurological: He is alert and oriented to person, place, and time.   Skin: Skin is warm and dry.       ED Course   Procedures  Labs Reviewed - No data to display       Imaging Results    None          Medications - No data to display  Medical Decision Making:   History:   Old Medical Records: I decided to obtain old medical records.  Initial Assessment:   57-year-old male with a history of gout presents emergency department with podagra in his right foot.  He is out of his gout medication.  I doubt septic joint.  He is given prescription for colchicine, allopurinol and hydrocodone.  Has follow-up with Podiatry in 2 days for steroid injection.  Return precautions discussed.  Discharged in no acute distress.                        Clinical Impression:   Final diagnoses:  [M10.9] Acute gout involving toe of right foot, unspecified cause (Primary)  [M79.671] Right foot pain  [M10.071] Acute idiopathic gout of right foot  [M10.072] Acute idiopathic gout of left ankle  [M10.071] Acute idiopathic gout of right ankle  [M25.571, M25.572] Acute bilateral ankle pain      ED Disposition Condition    Discharge Stable          ED Prescriptions       Medication Sig Dispense Start Date End Date Auth. Provider    allopurinoL (ZYLOPRIM) 300 MG tablet Take 1 tablet (300 mg total) by mouth once daily. 30 tablet 9/20/2022  11/19/2022 Nicola Khan MD    HYDROcodone-acetaminophen (NORCO) 5-325 mg per tablet Take 1 tablet by mouth every 6 (six) hours as needed for Pain. 28 tablet 9/20/2022 -- Nicola Khan MD    colchicine (COLCRYS) 0.6 mg tablet Take 1 tablet (0.6 mg total) by mouth once daily. Take 2 tablets by mouth and then take 1 tablet 1 hour later 3 tablet 9/20/2022 9/20/2023 Nicola Khan MD          Follow-up Information    None          Nicola Khan MD  09/20/22 2733

## 2022-09-21 NOTE — DISCHARGE INSTRUCTIONS
Future Appointments   Date Time Provider Department Center   9/22/2022  3:10 PM VJ GormanO PODIAT O at [facility]   9/29/2022 11:15 AM MD NAT TejedaO CARDIO O at [facility]

## 2022-09-22 ENCOUNTER — OFFICE VISIT (OUTPATIENT)
Dept: PODIATRY | Facility: CLINIC | Age: 57
End: 2022-09-22
Payer: COMMERCIAL

## 2022-09-22 VITALS — BODY MASS INDEX: 25.48 KG/M2 | WEIGHT: 178 LBS | HEIGHT: 70 IN

## 2022-09-22 DIAGNOSIS — M10.071 ACUTE IDIOPATHIC GOUT OF RIGHT FOOT: Primary | ICD-10-CM

## 2022-09-22 DIAGNOSIS — M79.671 RIGHT FOOT PAIN: ICD-10-CM

## 2022-09-22 PROCEDURE — 1160F PR REVIEW ALL MEDS BY PRESCRIBER/CLIN PHARMACIST DOCUMENTED: ICD-10-PCS | Mod: CPTII,S$GLB,, | Performed by: PODIATRIST

## 2022-09-22 PROCEDURE — 4010F PR ACE/ARB THEARPY RXD/TAKEN: ICD-10-PCS | Mod: CPTII,S$GLB,, | Performed by: PODIATRIST

## 2022-09-22 PROCEDURE — 3008F PR BODY MASS INDEX (BMI) DOCUMENTED: ICD-10-PCS | Mod: CPTII,S$GLB,, | Performed by: PODIATRIST

## 2022-09-22 PROCEDURE — 20600 PR DRAIN/INJECT SMALL JOINT/BURSA: ICD-10-PCS | Mod: RT,S$GLB,, | Performed by: PODIATRIST

## 2022-09-22 PROCEDURE — 3008F BODY MASS INDEX DOCD: CPT | Mod: CPTII,S$GLB,, | Performed by: PODIATRIST

## 2022-09-22 PROCEDURE — 1159F MED LIST DOCD IN RCRD: CPT | Mod: CPTII,S$GLB,, | Performed by: PODIATRIST

## 2022-09-22 PROCEDURE — 4010F ACE/ARB THERAPY RXD/TAKEN: CPT | Mod: CPTII,S$GLB,, | Performed by: PODIATRIST

## 2022-09-22 PROCEDURE — 99213 OFFICE O/P EST LOW 20 MIN: CPT | Mod: 25,S$GLB,, | Performed by: PODIATRIST

## 2022-09-22 PROCEDURE — 3044F HG A1C LEVEL LT 7.0%: CPT | Mod: CPTII,S$GLB,, | Performed by: PODIATRIST

## 2022-09-22 PROCEDURE — 3044F PR MOST RECENT HEMOGLOBIN A1C LEVEL <7.0%: ICD-10-PCS | Mod: CPTII,S$GLB,, | Performed by: PODIATRIST

## 2022-09-22 PROCEDURE — 20600 DRAIN/INJ JOINT/BURSA W/O US: CPT | Mod: RT,S$GLB,, | Performed by: PODIATRIST

## 2022-09-22 PROCEDURE — 1160F RVW MEDS BY RX/DR IN RCRD: CPT | Mod: CPTII,S$GLB,, | Performed by: PODIATRIST

## 2022-09-22 PROCEDURE — 99213 PR OFFICE/OUTPT VISIT, EST, LEVL III, 20-29 MIN: ICD-10-PCS | Mod: 25,S$GLB,, | Performed by: PODIATRIST

## 2022-09-22 PROCEDURE — 1159F PR MEDICATION LIST DOCUMENTED IN MEDICAL RECORD: ICD-10-PCS | Mod: CPTII,S$GLB,, | Performed by: PODIATRIST

## 2022-09-22 RX ORDER — METHYLPREDNISOLONE ACETATE 40 MG/ML
20 INJECTION, SUSPENSION INTRA-ARTICULAR; INTRALESIONAL; INTRAMUSCULAR; SOFT TISSUE
Status: COMPLETED | OUTPATIENT
Start: 2022-09-22 | End: 2022-09-22

## 2022-09-22 RX ORDER — BUPIVACAINE HYDROCHLORIDE 5 MG/ML
1.5 INJECTION, SOLUTION PERINEURAL
Status: COMPLETED | OUTPATIENT
Start: 2022-09-22 | End: 2022-09-22

## 2022-09-22 RX ORDER — DEXAMETHASONE SODIUM PHOSPHATE 4 MG/ML
4 INJECTION, SOLUTION INTRA-ARTICULAR; INTRALESIONAL; INTRAMUSCULAR; INTRAVENOUS; SOFT TISSUE
Status: COMPLETED | OUTPATIENT
Start: 2022-09-22 | End: 2022-09-22

## 2022-09-22 RX ADMIN — BUPIVACAINE HYDROCHLORIDE 7.5 MG: 5 INJECTION, SOLUTION PERINEURAL at 03:09

## 2022-09-22 RX ADMIN — METHYLPREDNISOLONE ACETATE 20 MG: 40 INJECTION, SUSPENSION INTRA-ARTICULAR; INTRALESIONAL; INTRAMUSCULAR; SOFT TISSUE at 03:09

## 2022-09-22 RX ADMIN — DEXAMETHASONE SODIUM PHOSPHATE 4 MG: 4 INJECTION, SOLUTION INTRA-ARTICULAR; INTRALESIONAL; INTRAMUSCULAR; INTRAVENOUS; SOFT TISSUE at 03:09

## 2022-09-22 NOTE — PATIENT INSTRUCTIONS
"What Is Gout?  Gout is a disease that affects the joints. Left untreated, it can lead to painful foot and joint deformities and even kidney problems. But, by treating gout early, you can relieve pain and help prevent future problems. Gout can usually be treated with medication and proper diet. In severe cases, surgery may be needed.    What causes gout?  Gout is caused by an excess of uric acid (a waste product made by the body). Uric acid is excreted by the kidneys. If the uric acid level in your blood rises too high, the uric acid may form crystals that collect in the joints, bringing on a gout attack. If you have many gout attacks, crystals may form large deposits called tophi. Tophi can damage joints and cause deformity.    Who is at risk for gout?  Men are more likely to have gout than women. But women can also be affected, mostly after menopause. Some health problems, such as obesity and high cholesterol, make gout more likely. And some medications, such as diuretics (water pills), can trigger a gout attack. People who drink a lot of alcohol are at high risk for gout. Certain foods can also trigger a gout attack.    Substances that may trigger a gout attack  To help prevent a gout attack, avoid these foods:  Alcohol (particularly beer, but also red wine and spirits)  Certain meats (red meat, processed meat, turkey)  Organ meats (kidney, liver, sweetbread)  Shellfish (lobster, crab, shrimp, scallop, mussel)  Certain fish (anchovy, sardine, herring, mackerel)     Treatment  Lifestyle changes, including weight loss, exercise, and quitting tobacco use  Reducing consumption of the food groups above as well as high fructose corn syrup, found in many foods including sodas and energy drinks  Changing non-essential medications that may contribute to gout (such as thiazide diuretics--"water pills")  Medications to reduce the amount of uric acid in the blood, such as allopurinol or others  Medications to treat acute " gout attacks, including NSAIDs (nonsteroidal anti-inflammatory medicines), steroids, and colchicine    Date Last Reviewed: 2/1/2016  © 7481-6213 [employer]. [address] [place] [address], [place] [address]. All rights reserved. This information is not intended as a substitute for professional medical care. Always follow your healthcare professional's instructions.       Gout Diet  Gout is a painful condition caused by an excess of uric acid, a waste product made by the body. Uric acid forms crystals that collect in the joints. The immune response to these crystals brings on symptoms of joint pain and swelling. This is called a gout attack. Often, medications and diet changes are combined to manage gout. Below are some guidelines for changing your diet to help you manage gout and prevent attacks. Your health care provider will help you determine the best eating plan for you.     Eating to manage gout  Weight loss for those who are overweight may help reduce gout attacks.    Eat less of these foods  Eating too many foods containing purines may raise the levels of uric acid in your body. This raises your risk for a gout attack. Try to limit these foods and drinks:  Alcohol, such as beer and red wine. You may be told to avoid alcohol completely.  Soft drinks that contain sugar or high fructose corn syrup  Certain fish, including anchovies, sardines, fish eggs, and herring  Shellfish  Certain meats, such as red meat, hot dogs, luncheon meats, and turkey  Organ meats, such as liver, kidneys, and sweetbreads  Legumes, such as dried beans and peas  Other high fat foods such as gravy, whole milk, and high fat cheeses  Vegetables such as asparagus, cauliflower, spinach, and mushrooms used to be thought to contribute to an increased risk for a gout attack, but recent studies show that high purine vegetables don't increase the risk for a gout attack.    Eat more of these foods  Other foods may be helpful for people with  gout. Add some of these foods to your diet:  Cherries contain chemicals that may lower uric acid.  Omega fatty acids. These are found in some fatty fish such as salmon, certain oils (flax, olive, or nut), and nuts themselves. Omega fatty acids may help prevent inflammation due to gout.  Dairy products that are low-fat or fat-free, such as cheese and yogurt  Complex carbohydrate foods, including whole grains, brown rice, oats, and beans  Coffee, in moderation  Water, approximately 64 ounces per day    Follow-up care  Follow up with your healthcare provider as advised.    When to seek medical advice  Call your healthcare provider right away if any of these occur:  Return of gout symptoms, usually at night:  Severe pain, swelling, and heat in a joint, especially the base of the big toe  Affected joint is hard to move  Skin of the affected joint is purple or red  Fever of 100.4°F (38°C) or higher  Pain that doesn't get better even with prescribed medicine     Date Last Reviewed: 1/12/2016  © 2300-8086 [employer]. [address] [place] [address]. All rights reserved. This information is not intended as a substitute for professional medical care. Always follow your healthcare professional's instructions.

## 2022-09-22 NOTE — PROGRESS NOTES
"  [address] [facility] [address]  [place] [address]  Phone: [phone]   [phone]    Patient's PCP:Primary Doctor No  Referring Provider: No ref. provider found    Subjective:      Chief Complaint:: Gout (Right foot)    SHANDRA Richard is a 57 y.o. male who presents today with a complaint of right foot pain from gout lasting since 9/20/2022. Patient was seen in the emergency room and was told to follow up with podiatry and reports no trauma.  Current symptoms include pain 10/10 when walking and standing for long periods of time. Patient was recently treated for gout and now patient states that he has finished the Allopurinol and symptoms have recurred. Symptoms have progressed. Treatment to date have included Allopurinol. Patient is requesting cortisone injection.        Vitals:    09/22/22 1517   Weight: 80.7 kg (178 lb)   Height: 5' 10" (1.778 m)   PainSc: 10-Worst pain ever      Shoe Size: 9    Past Surgical History:   Procedure Laterality Date    CORONARY ANGIOGRAPHY INCLUDING BYPASS GRAFTS WITH CATHETERIZATION OF LEFT HEART N/A 9/8/2022    Procedure: ANGIOGRAM, CORONARY, INCLUDING BYPASS GRAFT, WITH LEFT HEART CATHETERIZATION;  Surgeon: Andrews Lopez MD;  Location: [facility] CATH/EP LAB;  Service: Cardiology;  Laterality: N/A;    CORONARY ARTERY BYPASS GRAFT       Past Medical History:   Diagnosis Date    Coronary artery disease     COVID-19     Gout     Hyperlipidemia     Hypertension      Family History   Problem Relation Age of Onset    Heart disease Father         Social History:   Marital Status: Single  Alcohol History:  reports that he does not currently use alcohol.  Tobacco History:  reports that he has quit smoking. He has never used smokeless tobacco.  Drug History:  reports no history of drug use.    Review of patient's allergies indicates:  No Known Allergies    Current Outpatient Medications   Medication Sig Dispense Refill    allopurinoL (ZYLOPRIM) 300 MG tablet Take 1 tablet (300 mg total) " by mouth once daily. 30 tablet 1    aspirin 81 MG Chew Take 1 tablet (81 mg total) by mouth once daily. 90 tablet 0    atorvastatin (LIPITOR) 40 MG tablet Take 1 tablet (40 mg total) by mouth once daily. 90 tablet 0    HYDROcodone-acetaminophen (NORCO) 5-325 mg per tablet Take 1 tablet by mouth every 6 (six) hours as needed for Pain. 28 tablet 0    ibuprofen (ADVIL,MOTRIN) 600 MG tablet Take 1 tablet (600 mg total) by mouth every 6 (six) hours as needed for Pain. 20 tablet 0    losartan (COZAAR) 25 MG tablet Take 1 tablet (25 mg total) by mouth once daily. 90 tablet 0    metoprolol succinate (TOPROL-XL) 100 MG 24 hr tablet Take 1 tablet (100 mg total) by mouth once daily. 90 tablet 0    nitroGLYCERIN (NITROSTAT) 0.4 MG SL tablet Place 1 tablet (0.4 mg total) under the tongue every 5 (five) minutes as needed for Chest pain. 15 tablet 0    colchicine (COLCRYS) 0.6 mg tablet Take 1 tablet (0.6 mg total) by mouth once daily. Take 2 tablets by mouth and then take 1 tablet 1 hour later 3 tablet 0     Current Facility-Administered Medications   Medication Dose Route Frequency Provider Last Rate Last Admin    acetaminophen tablet 650 mg  650 mg Oral Once PRN Cristina Hammond MD           Review of Systems   Constitutional:  Negative for chills, fatigue, fever and unexpected weight change.   HENT:  Negative for hearing loss and trouble swallowing.    Eyes:  Negative for photophobia and visual disturbance.   Respiratory:  Negative for cough, shortness of breath and wheezing.    Cardiovascular:  Negative for chest pain, palpitations and leg swelling.   Gastrointestinal:  Negative for abdominal pain and nausea.   Genitourinary:  Negative for dysuria and frequency.   Musculoskeletal:  Positive for arthralgias and joint swelling. Negative for back pain, gait problem and myalgias.   Skin:  Negative for rash and wound.   Neurological:  Negative for tremors, seizures, weakness, numbness and headaches.   Hematological:  Does not  bruise/bleed easily.       Objective:        Physical Exam:   Foot Exam    General  Orientation: alert and oriented to person, place, and time   Affect: appropriate   Gait: unimpaired       Right Foot/Ankle     Inspection and Palpation  Ecchymosis: none  Tenderness: great toe metatarsophalangeal joint   Swelling: great toe metatarsophalangeal joint   Arch: normal  Hallux valgus: yes  Skin Exam: skin intact; no drainage, no ulcer and no erythema   Neurovascular  Dorsalis pedis: 2+  Posterior tibial: 2+  Capillary Refill: 2+  Varicose veins: not present  Saphenous nerve sensation: normal  Tibial nerve sensation: normal  Superficial peroneal nerve sensation: normal  Deep peroneal nerve sensation: normal  Sural nerve sensation: normal    Edema  Type of edema: non-pitting    Muscle Strength  Ankle dorsiflexion: 5  Ankle plantar flexion: 5  Ankle inversion: 5  Ankle eversion: 5  Great toe extension: 5  Great toe flexion: 5    Range of Motion    Normal right ankle ROM  Passive  Ankle dorsiflexion: pain  1st MTP extension: pain  1st MTP flexion: pain    Active  1st MTP extension: pain  1st MTP flexion: pain    Tests  Anterior drawer: negative   Talar tilt: negative   PT Tinel's sign: negative    Paresthesia: negative      Physical Exam  Cardiovascular:      Pulses:           Dorsalis pedis pulses are 2+ on the right side.        Posterior tibial pulses are 2+ on the right side.   Musculoskeletal:      Right foot: Bunion present.   Feet:      Right foot:      Skin integrity: No ulcer or erythema.             Right Ankle/Foot Exam     Swelling   The patient is swollen on the great toe metatarsophalangeal joint.    Tenderness   The patient is tender to palpation of the great toe metatarsophalangeal joint.    Range of Motion   The patient has normal right ankle ROM.        Muscle Strength   Right Lower Extremity   Ankle Dorsiflexion:  5   Plantar flexion:  5/5    Vascular Exam     Right Pulses  Dorsalis Pedis:       2+  Posterior Tibial:      2+         Imaging: none            Assessment:       1. Acute idiopathic gout of right foot    2. Right foot pain      Plan:   Acute idiopathic gout of right foot  -     methylPREDNISolone acetate injection 20 mg  -     BUPivacaine injection 7.5 mg  -     dexamethasone injection 4 mg  -     Uric acid; Future; Expected date: 09/22/2022    Right foot pain  -     methylPREDNISolone acetate injection 20 mg  -     BUPivacaine injection 7.5 mg  -     dexamethasone injection 4 mg  -     Uric acid; Future; Expected date: 09/22/2022    Follow up if symptoms worsen or fail to improve.    Procedures Informed consent was obtained.  Time-out was called.  The area was prepped with alcohol, and a steroid injection was performed at right 1st MPJ using 1.5 cc of 0.5% Marcaine w/out epi, 1 cc Dexamethasone, 0.5 cc Methylprednisolone. Patient tolerated the procedure well.             Counseling:     I provided patient education verbally regarding:   Patient diagnosis, treatment options, as well as alternatives, risks, and benefits.     causes of gout, diet for gout and oral medication for gout.  I discussed lowering the uric acid level to below 6 and as close to 5.5 to stop uric acid crystal formation.      This note was created using Dragon voice recognition software that occasionally misinterpreted phrases or words.

## 2022-09-29 ENCOUNTER — OFFICE VISIT (OUTPATIENT)
Dept: CARDIOLOGY | Facility: CLINIC | Age: 57
End: 2022-09-29
Payer: COMMERCIAL

## 2022-09-29 ENCOUNTER — TELEPHONE (OUTPATIENT)
Dept: CARDIOLOGY | Facility: CLINIC | Age: 57
End: 2022-09-29

## 2022-09-29 VITALS
WEIGHT: 180 LBS | RESPIRATION RATE: 16 BRPM | HEART RATE: 61 BPM | OXYGEN SATURATION: 97 % | BODY MASS INDEX: 25.77 KG/M2 | DIASTOLIC BLOOD PRESSURE: 74 MMHG | HEIGHT: 70 IN | SYSTOLIC BLOOD PRESSURE: 122 MMHG

## 2022-09-29 DIAGNOSIS — I10 ESSENTIAL HYPERTENSION: ICD-10-CM

## 2022-09-29 DIAGNOSIS — E78.2 MIXED HYPERLIPIDEMIA: ICD-10-CM

## 2022-09-29 DIAGNOSIS — Z95.1 HISTORY OF CORONARY ARTERY BYPASS GRAFT: ICD-10-CM

## 2022-09-29 DIAGNOSIS — R94.39 ABNORMAL CARDIOVASCULAR STRESS TEST: ICD-10-CM

## 2022-09-29 DIAGNOSIS — I25.10 CORONARY ARTERY DISEASE INVOLVING NATIVE CORONARY ARTERY OF NATIVE HEART WITHOUT ANGINA PECTORIS: ICD-10-CM

## 2022-09-29 DIAGNOSIS — R07.9 CHEST PAIN, UNSPECIFIED TYPE: Primary | ICD-10-CM

## 2022-09-29 DIAGNOSIS — R94.39 ABNORMAL STRESS TEST: ICD-10-CM

## 2022-09-29 PROCEDURE — 4010F ACE/ARB THERAPY RXD/TAKEN: CPT | Mod: CPTII,S$GLB,, | Performed by: INTERNAL MEDICINE

## 2022-09-29 PROCEDURE — 1160F RVW MEDS BY RX/DR IN RCRD: CPT | Mod: CPTII,S$GLB,, | Performed by: INTERNAL MEDICINE

## 2022-09-29 PROCEDURE — 3044F PR MOST RECENT HEMOGLOBIN A1C LEVEL <7.0%: ICD-10-PCS | Mod: CPTII,S$GLB,, | Performed by: INTERNAL MEDICINE

## 2022-09-29 PROCEDURE — 1159F MED LIST DOCD IN RCRD: CPT | Mod: CPTII,S$GLB,, | Performed by: INTERNAL MEDICINE

## 2022-09-29 PROCEDURE — 3078F DIAST BP <80 MM HG: CPT | Mod: CPTII,S$GLB,, | Performed by: INTERNAL MEDICINE

## 2022-09-29 PROCEDURE — 3008F PR BODY MASS INDEX (BMI) DOCUMENTED: ICD-10-PCS | Mod: CPTII,S$GLB,, | Performed by: INTERNAL MEDICINE

## 2022-09-29 PROCEDURE — 1159F PR MEDICATION LIST DOCUMENTED IN MEDICAL RECORD: ICD-10-PCS | Mod: CPTII,S$GLB,, | Performed by: INTERNAL MEDICINE

## 2022-09-29 PROCEDURE — 4010F PR ACE/ARB THEARPY RXD/TAKEN: ICD-10-PCS | Mod: CPTII,S$GLB,, | Performed by: INTERNAL MEDICINE

## 2022-09-29 PROCEDURE — 3078F PR MOST RECENT DIASTOLIC BLOOD PRESSURE < 80 MM HG: ICD-10-PCS | Mod: CPTII,S$GLB,, | Performed by: INTERNAL MEDICINE

## 2022-09-29 PROCEDURE — 99214 OFFICE O/P EST MOD 30 MIN: CPT | Mod: S$GLB,,, | Performed by: INTERNAL MEDICINE

## 2022-09-29 PROCEDURE — 3074F PR MOST RECENT SYSTOLIC BLOOD PRESSURE < 130 MM HG: ICD-10-PCS | Mod: CPTII,S$GLB,, | Performed by: INTERNAL MEDICINE

## 2022-09-29 PROCEDURE — 3008F BODY MASS INDEX DOCD: CPT | Mod: CPTII,S$GLB,, | Performed by: INTERNAL MEDICINE

## 2022-09-29 PROCEDURE — 3074F SYST BP LT 130 MM HG: CPT | Mod: CPTII,S$GLB,, | Performed by: INTERNAL MEDICINE

## 2022-09-29 PROCEDURE — 1160F PR REVIEW ALL MEDS BY PRESCRIBER/CLIN PHARMACIST DOCUMENTED: ICD-10-PCS | Mod: CPTII,S$GLB,, | Performed by: INTERNAL MEDICINE

## 2022-09-29 PROCEDURE — 99214 PR OFFICE/OUTPT VISIT, EST, LEVL IV, 30-39 MIN: ICD-10-PCS | Mod: S$GLB,,, | Performed by: INTERNAL MEDICINE

## 2022-09-29 PROCEDURE — 3044F HG A1C LEVEL LT 7.0%: CPT | Mod: CPTII,S$GLB,, | Performed by: INTERNAL MEDICINE

## 2022-09-29 RX ORDER — ATORVASTATIN CALCIUM 80 MG/1
80 TABLET, FILM COATED ORAL NIGHTLY
Qty: 90 TABLET | Refills: 3 | Status: SHIPPED | OUTPATIENT
Start: 2022-09-29 | End: 2022-11-21 | Stop reason: SDUPTHER

## 2022-09-29 NOTE — PROGRESS NOTES
[facility] - Cardiology    Subjective:     Patient ID:  Irwin Richard is a 57 y.o. male patient here for evaluation Hospital Follow Up (Follow up angiogram, no stent placed)      HPI:  57-year-old male with history of coronary artery disease status post CABG with abnormal fixed defect on the stress test here for follow-up.  He had an angiogram which showed patent grafts.  Patient continues to report tightness on the lateral side of his chest radiating to his back that mostly occurs at rest.  He reports being extremely active without any exertional chest pain or shortness of breath.    Review of Systems   All other systems reviewed and are negative.     Past Medical History:   Diagnosis Date    Coronary artery disease     COVID-19     Gout     Hyperlipidemia     Hypertension        Past Surgical History:   Procedure Laterality Date    CORONARY ANGIOGRAPHY INCLUDING BYPASS GRAFTS WITH CATHETERIZATION OF LEFT HEART N/A 9/8/2022    Procedure: ANGIOGRAM, CORONARY, INCLUDING BYPASS GRAFT, WITH LEFT HEART CATHETERIZATION;  Surgeon: Andrews Lopez MD;  Location: [facility] CATH/EP LAB;  Service: Cardiology;  Laterality: N/A;    CORONARY ARTERY BYPASS GRAFT         Family History   Problem Relation Age of Onset    Heart disease Father        Social History     Socioeconomic History    Marital status: Single   Tobacco Use    Smoking status: Former    Smokeless tobacco: Never   Substance and Sexual Activity    Alcohol use: Not Currently    Drug use: Never       Current Outpatient Medications   Medication Sig Dispense Refill    aspirin 81 MG Chew Take 1 tablet (81 mg total) by mouth once daily. 90 tablet 0    losartan (COZAAR) 25 MG tablet Take 1 tablet (25 mg total) by mouth once daily. 90 tablet 0    metoprolol succinate (TOPROL-XL) 100 MG 24 hr tablet Take 1 tablet (100 mg total) by mouth once daily. 90 tablet 0    allopurinoL (ZYLOPRIM) 300 MG tablet Take 1 tablet (300 mg total) by mouth once daily. 30 tablet 1    atorvastatin (LIPITOR)  80 MG tablet Take 1 tablet (80 mg total) by mouth every evening. 90 tablet 3    HYDROcodone-acetaminophen (NORCO) 5-325 mg per tablet Take 1 tablet by mouth every 6 (six) hours as needed for Pain. 28 tablet 0    ibuprofen (ADVIL,MOTRIN) 600 MG tablet Take 1 tablet (600 mg total) by mouth every 6 (six) hours as needed for Pain. 20 tablet 0    nitroGLYCERIN (NITROSTAT) 0.4 MG SL tablet Place 1 tablet (0.4 mg total) under the tongue every 5 (five) minutes as needed for Chest pain. 15 tablet 0     Current Facility-Administered Medications   Medication Dose Route Frequency Provider Last Rate Last Admin    acetaminophen tablet 650 mg  650 mg Oral Once PRN Cristina Hammond MD           Review of patient's allergies indicates:  No Known Allergies      Objective:        Vitals:    09/29/22 1119   BP: 122/74   Pulse: 61   Resp: 16       Physical Exam  Vitals reviewed.   Constitutional:       Appearance: Normal appearance.   HENT:      Mouth/Throat:      Mouth: Mucous membranes are moist.   Eyes:      Extraocular Movements: Extraocular movements intact.      Pupils: Pupils are equal, round, and reactive to light.   Cardiovascular:      Rate and Rhythm: Normal rate and regular rhythm.      Pulses: Normal pulses.      Heart sounds: Normal heart sounds. No murmur heard.    No gallop.   Pulmonary:      Effort: Pulmonary effort is normal.      Breath sounds: Normal breath sounds.   Abdominal:      General: Bowel sounds are normal.      Palpations: Abdomen is soft.   Musculoskeletal:         General: Normal range of motion.      Cervical back: Normal range of motion.   Skin:     General: Skin is warm and dry.   Neurological:      General: No focal deficit present.      Mental Status: He is alert and oriented to person, place, and time.   Psychiatric:         Mood and Affect: Mood normal.       LIPIDS - LAST 2   Lab Results   Component Value Date    CHOL 202 (H) 07/29/2022    HDL 52 07/29/2022    LDLCALC 134.6 07/29/2022    TRIG 77  07/29/2022    CHOLHDL 25.7 07/29/2022       CBC - LAST 2  Lab Results   Component Value Date    WBC 6.76 09/06/2022    WBC 7.58 07/30/2022    RBC 4.80 09/06/2022    RBC 4.66 07/30/2022    HGB 14.2 09/06/2022    HGB 14.1 07/30/2022    HCT 44.1 09/06/2022    HCT 42.6 07/30/2022    MCV 92 09/06/2022    MCV 91 07/30/2022    MCH 29.6 09/06/2022    MCH 30.3 07/30/2022    MCHC 32.2 09/06/2022    MCHC 33.1 07/30/2022    RDW 11.7 09/06/2022    RDW 11.5 07/30/2022     09/06/2022     07/30/2022    MPV 10.0 09/06/2022    MPV 10.0 07/30/2022    GRAN 2.9 09/06/2022    GRAN 42.7 09/06/2022    LYMPH 2.9 09/06/2022    LYMPH 42.6 09/06/2022    MONO 0.7 09/06/2022    MONO 10.9 09/06/2022    BASO 0.02 09/06/2022    BASO 0.03 07/30/2022    NRBC 0 09/06/2022    NRBC 0 07/30/2022       CHEMISTRY & LIVER FUNCTION - LAST 2  Lab Results   Component Value Date     09/06/2022     07/30/2022    K 4.0 09/06/2022    K 3.9 07/30/2022     09/06/2022     07/30/2022    CO2 29 09/06/2022    CO2 28 07/30/2022    ANIONGAP 5 (L) 09/06/2022    ANIONGAP 12 07/30/2022    BUN 11 09/06/2022    BUN 13 07/30/2022    CREATININE 0.9 09/06/2022    CREATININE 0.9 07/30/2022    GLU 86 09/06/2022    GLU 87 07/30/2022    CALCIUM 9.4 09/06/2022    CALCIUM 9.2 07/30/2022    MG 2.1 07/29/2022    ALBUMIN 3.9 07/28/2022    PROT 7.4 07/28/2022    ALKPHOS 73 07/28/2022    ALT 12 07/28/2022    AST 15 07/28/2022    BILITOT 0.4 07/28/2022        CARDIAC PROFILE - LAST 2  Lab Results   Component Value Date     (H) 07/28/2022    TROPONINI <0.006 07/29/2022    TROPONINI 0.008 07/28/2022        COAGULATION - LAST 2  No results found for: LABPT, INR, APTT    ENDOCRINE & PSA - LAST 2  Lab Results   Component Value Date    HGBA1C 5.7 (H) 07/29/2022    TSH 3.808 07/29/2022        ECHOCARDIOGRAM RESULTS  Results for orders placed during the hospital encounter of 07/28/22    Echo    Interpretation Summary  · The left ventricle is normal in  size with concentric remodeling and low normal systolic function.  · The estimated ejection fraction is 50%.  · Normal left ventricular diastolic function.  · There is mild left ventricular global hypokinesis.  · Normal right ventricular size with normal right ventricular systolic function.  · Normal central venous pressure (3 mmHg).  · The estimated PA systolic pressure is 24 mmHg.      CURRENT/PREVIOUS VISIT EKG  Results for orders placed or performed during the hospital encounter of 09/06/22   EKG 12-lead    Collection Time: 09/06/22  3:19 PM    Narrative    Test Reason : R94.39,R07.9,    Vent. Rate : 055 BPM     Atrial Rate : 055 BPM     P-R Int : 160 ms          QRS Dur : 088 ms      QT Int : 420 ms       P-R-T Axes : 048 -09 053 degrees     QTc Int : 401 ms    Sinus bradycardia  Nonspecific ST abnormality  Abnormal ECG  When compared with ECG of 01-SEP-2022 09:31,  No significant change was found  Confirmed by Abad Bryson MD ([id number]) on 9/15/2022 6:22:19 PM    Referred By:             Confirmed By:Abad Bryson MD     No valid procedures specified.   Results for orders placed during the hospital encounter of 07/28/22    Nuclear Stress Test    Interpretation Summary    The EKG portion of this study is negative for ischemia.    The patient reported no chest pain during the stress test.    During stress, rare PVCs are noted.    The nuclear portion of this study will be reported separately.    No valid procedures specified.        Assessment:       1. Chest pain, unspecified type    2. Abnormal cardiovascular stress test    3. Coronary artery disease involving native coronary artery of native heart without angina pectoris    4. History of coronary artery bypass graft    5. Abnormal stress test    6. Essential hypertension    7. Mixed hyperlipidemia             Plan:       Chest pain, unspecified type    Abnormal cardiovascular stress test    Coronary artery disease involving native coronary artery of native heart  without angina pectoris    History of coronary artery bypass graft    Abnormal stress test    Essential hypertension    Mixed hyperlipidemia    Other orders  -     atorvastatin (LIPITOR) 80 MG tablet; Take 1 tablet (80 mg total) by mouth every evening.  Dispense: 90 tablet; Refill: 3    Patient offered cardiac rehab for further management but he would not like to do any cardiac rehab as he works every day.  Discussed with him regarding continue with current medications strategy as his symptoms appear extremely atypical for angina versus 2nd opinion with Dr. Claudio Cerda at [facility] for any complicated PCIs.  Patient would like to see Dr.Raj Cerda, referral placed.  Will send an angiogram discomfort there.  Continue with aspirin.  Will increase statin as LDL was 134 on last echo.  Check an annual lipid profile via primary care physician with goal LDL of around .  Hypertension is well controlled, continue current therapy.  Follow-up in 6 months with nurse practitioner and in 1 year with me.    Follow up in about 1 year (around 9/29/2023) for CAD s/p CABG.          Andrews Lopez MD  [facility] - Cardiology

## 2022-09-29 NOTE — TELEPHONE ENCOUNTER
I  called Lois at [facility]  She will send patient's CD to Dr. Portillo Ko MD

## 2022-10-24 DIAGNOSIS — I25.10 CORONARY ARTERY DISEASE INVOLVING NATIVE CORONARY ARTERY OF NATIVE HEART WITHOUT ANGINA PECTORIS: Primary | ICD-10-CM

## 2022-10-27 ENCOUNTER — TELEPHONE (OUTPATIENT)
Dept: CARDIOLOGY | Facility: CLINIC | Age: 57
End: 2022-10-27
Payer: COMMERCIAL

## 2022-10-27 ENCOUNTER — OFFICE VISIT (OUTPATIENT)
Dept: PODIATRY | Facility: CLINIC | Age: 57
End: 2022-10-27
Payer: COMMERCIAL

## 2022-10-27 VITALS
HEIGHT: 70 IN | BODY MASS INDEX: 25.77 KG/M2 | HEART RATE: 67 BPM | WEIGHT: 180 LBS | RESPIRATION RATE: 16 BRPM | OXYGEN SATURATION: 98 %

## 2022-10-27 DIAGNOSIS — M79.671 RIGHT FOOT PAIN: ICD-10-CM

## 2022-10-27 DIAGNOSIS — M10.071 ACUTE IDIOPATHIC GOUT OF RIGHT ANKLE: ICD-10-CM

## 2022-10-27 DIAGNOSIS — M10.071 ACUTE IDIOPATHIC GOUT OF RIGHT FOOT: Primary | ICD-10-CM

## 2022-10-27 DIAGNOSIS — M25.571 ACUTE RIGHT ANKLE PAIN: ICD-10-CM

## 2022-10-27 PROCEDURE — 1159F MED LIST DOCD IN RCRD: CPT | Mod: CPTII,S$GLB,, | Performed by: PODIATRIST

## 2022-10-27 PROCEDURE — 3044F PR MOST RECENT HEMOGLOBIN A1C LEVEL <7.0%: ICD-10-PCS | Mod: CPTII,S$GLB,, | Performed by: PODIATRIST

## 2022-10-27 PROCEDURE — 99213 OFFICE O/P EST LOW 20 MIN: CPT | Mod: S$GLB,,, | Performed by: PODIATRIST

## 2022-10-27 PROCEDURE — 4010F PR ACE/ARB THEARPY RXD/TAKEN: ICD-10-PCS | Mod: CPTII,S$GLB,, | Performed by: PODIATRIST

## 2022-10-27 PROCEDURE — 1160F RVW MEDS BY RX/DR IN RCRD: CPT | Mod: CPTII,S$GLB,, | Performed by: PODIATRIST

## 2022-10-27 PROCEDURE — 1160F PR REVIEW ALL MEDS BY PRESCRIBER/CLIN PHARMACIST DOCUMENTED: ICD-10-PCS | Mod: CPTII,S$GLB,, | Performed by: PODIATRIST

## 2022-10-27 PROCEDURE — 3044F HG A1C LEVEL LT 7.0%: CPT | Mod: CPTII,S$GLB,, | Performed by: PODIATRIST

## 2022-10-27 PROCEDURE — 99213 PR OFFICE/OUTPT VISIT, EST, LEVL III, 20-29 MIN: ICD-10-PCS | Mod: S$GLB,,, | Performed by: PODIATRIST

## 2022-10-27 PROCEDURE — 4010F ACE/ARB THERAPY RXD/TAKEN: CPT | Mod: CPTII,S$GLB,, | Performed by: PODIATRIST

## 2022-10-27 PROCEDURE — 1159F PR MEDICATION LIST DOCUMENTED IN MEDICAL RECORD: ICD-10-PCS | Mod: CPTII,S$GLB,, | Performed by: PODIATRIST

## 2022-10-27 RX ORDER — METHYLPREDNISOLONE 4 MG/1
TABLET ORAL
Qty: 1 EACH | Refills: 0 | Status: SHIPPED | OUTPATIENT
Start: 2022-10-27 | End: 2022-12-08

## 2022-10-27 RX ORDER — HYDROCODONE BITARTRATE AND ACETAMINOPHEN 5; 325 MG/1; MG/1
1 TABLET ORAL EVERY 6 HOURS PRN
Qty: 28 TABLET | Refills: 0 | Status: SHIPPED | OUTPATIENT
Start: 2022-10-27 | End: 2022-12-08

## 2022-10-27 NOTE — PROGRESS NOTES
"  [address] [facility] [address] [place] [address]  Phone: [phone]   [phone]    Patient's PCP:Primary Doctor No  Referring Provider: No ref. provider found    Subjective:      Chief Complaint:: Foot Pain (Gout Flare up in R Foot)    Foot Pain  Associated symptoms include arthralgias and joint swelling. Pertinent negatives include no abdominal pain, chest pain, chills, coughing, fatigue, fever, headaches, myalgias, nausea, numbness, rash or weakness.   Irwin Richard is a 57 y.o. male who presents  with a complaint of right foot pain from gout lasting since 9/20/2022. Patient was seen in the emergency room and was told to follow up with podiatry and reports no trauma.  Current symptoms include pain 10/10 when walking and standing for long periods of time. Patient was recently treated for gout and now patient states that he has finished the Allopurinol and symptoms have recurred. Symptoms have progressed. Treatment to date have included Allopurinol and steroid injection. Patient said the pain is not getting any better.    Vitals:    10/27/22 1341   Pulse: 67   Resp: 16   SpO2: 98%   Weight: 81.6 kg (180 lb)   Height: 5' 10" (1.778 m)   PainSc: 10-Worst pain ever      Shoe Size: 9    Past Surgical History:   Procedure Laterality Date    CORONARY ANGIOGRAPHY INCLUDING BYPASS GRAFTS WITH CATHETERIZATION OF LEFT HEART N/A 9/8/2022    Procedure: ANGIOGRAM, CORONARY, INCLUDING BYPASS GRAFT, WITH LEFT HEART CATHETERIZATION;  Surgeon: Andrews Lopez MD;  Location: [facility] CATH/EP LAB;  Service: Cardiology;  Laterality: N/A;    CORONARY ARTERY BYPASS GRAFT       Past Medical History:   Diagnosis Date    Coronary artery disease     COVID-19     Gout     Hyperlipidemia     Hypertension      Family History   Problem Relation Age of Onset    Heart disease Father         Social History:   Marital Status: Single  Alcohol History:  reports that he does not currently use alcohol.  Tobacco History:  reports that he has " quit smoking. He has never used smokeless tobacco.  Drug History:  reports no history of drug use.    Review of patient's allergies indicates:  No Known Allergies    Current Outpatient Medications   Medication Sig Dispense Refill    allopurinoL (ZYLOPRIM) 300 MG tablet Take 1 tablet (300 mg total) by mouth once daily. 30 tablet 1    aspirin 81 MG Chew Take 1 tablet (81 mg total) by mouth once daily. 90 tablet 0    atorvastatin (LIPITOR) 80 MG tablet Take 1 tablet (80 mg total) by mouth every evening. 90 tablet 3    HYDROcodone-acetaminophen (NORCO) 5-325 mg per tablet Take 1 tablet by mouth every 6 (six) hours as needed for Pain. 28 tablet 0    ibuprofen (ADVIL,MOTRIN) 600 MG tablet Take 1 tablet (600 mg total) by mouth every 6 (six) hours as needed for Pain. 20 tablet 0    losartan (COZAAR) 25 MG tablet Take 1 tablet (25 mg total) by mouth once daily. 90 tablet 0    methylPREDNISolone (MEDROL DOSEPACK) 4 mg tablet use as directed 1 each 0    metoprolol succinate (TOPROL-XL) 100 MG 24 hr tablet Take 1 tablet (100 mg total) by mouth once daily. 90 tablet 0    nitroGLYCERIN (NITROSTAT) 0.4 MG SL tablet Place 1 tablet (0.4 mg total) under the tongue every 5 (five) minutes as needed for Chest pain. 15 tablet 0     Current Facility-Administered Medications   Medication Dose Route Frequency Provider Last Rate Last Admin    acetaminophen tablet 650 mg  650 mg Oral Once PRN Cristina Hammond MD           Review of Systems   Constitutional:  Negative for chills, fatigue, fever and unexpected weight change.   HENT:  Negative for hearing loss and trouble swallowing.    Eyes:  Negative for photophobia and visual disturbance.   Respiratory:  Negative for cough, shortness of breath and wheezing.    Cardiovascular:  Negative for chest pain, palpitations and leg swelling.   Gastrointestinal:  Negative for abdominal pain and nausea.   Genitourinary:  Negative for dysuria and frequency.   Musculoskeletal:  Positive for arthralgias and  joint swelling. Negative for back pain, gait problem and myalgias.   Skin:  Negative for rash and wound.   Neurological:  Negative for tremors, seizures, weakness, numbness and headaches.   Hematological:  Does not bruise/bleed easily.       Objective:        Physical Exam:   Foot Exam    General  Orientation: alert and oriented to person, place, and time   Affect: appropriate   Gait: unimpaired       Right Foot/Ankle     Inspection and Palpation  Ecchymosis: none  Tenderness: great toe metatarsophalangeal joint and ankle   Swelling: great toe metatarsophalangeal joint and ankle   Arch: normal  Hallux valgus: yes  Skin Exam: skin intact; no drainage, no ulcer and no erythema   Neurovascular  Dorsalis pedis: 2+  Posterior tibial: 2+  Capillary Refill: 2+  Varicose veins: not present  Saphenous nerve sensation: normal  Tibial nerve sensation: normal  Superficial peroneal nerve sensation: normal  Deep peroneal nerve sensation: normal  Sural nerve sensation: normal    Edema  Type of edema: non-pitting    Muscle Strength  Ankle dorsiflexion: 5  Ankle plantar flexion: 5  Ankle inversion: 5  Ankle eversion: 5  Great toe extension: 5  Great toe flexion: 5    Range of Motion    Normal right ankle ROM  Passive  Ankle dorsiflexion: pain  Ankle plantar flexion: pain  1st MTP extension: pain  1st MTP flexion: pain    Active  Ankle dorsiflexion: pain  Ankle plantar flexion: pain  1st MTP extension: pain  1st MTP flexion: pain    Tests  Anterior drawer: negative   Talar tilt: negative   PT Tinel's sign: negative    Paresthesia: negative      Physical Exam  Cardiovascular:      Pulses:           Dorsalis pedis pulses are 2+ on the right side.        Posterior tibial pulses are 2+ on the right side.   Musculoskeletal:      Right ankle: Swelling present. Tenderness present.      Right foot: Bunion present.   Feet:      Right foot:      Skin integrity: No ulcer or erythema.             Right Ankle/Foot Exam     Swelling   The patient  is swollen on the great toe metatarsophalangeal joint.    Tenderness   The patient is tender to palpation of the great toe metatarsophalangeal joint.    Range of Motion   The patient has normal right ankle ROM.        Muscle Strength   Right Lower Extremity   Ankle Dorsiflexion:  5   Plantar flexion:  5/5    Vascular Exam     Right Pulses  Dorsalis Pedis:      2+  Posterior Tibial:      2+         Imaging: none            Assessment:       1. Acute idiopathic gout of right foot    2. Acute idiopathic gout of right ankle    3. Acute right ankle pain    4. Right foot pain      Plan:   Acute idiopathic gout of right foot  -     methylPREDNISolone (MEDROL DOSEPACK) 4 mg tablet; use as directed  Dispense: 1 each; Refill: 0  -     HYDROcodone-acetaminophen (NORCO) 5-325 mg per tablet; Take 1 tablet by mouth every 6 (six) hours as needed for Pain.  Dispense: 28 tablet; Refill: 0  -     AIR CAST WALKER BOOT FOR HOME USE    Acute idiopathic gout of right ankle  -     methylPREDNISolone (MEDROL DOSEPACK) 4 mg tablet; use as directed  Dispense: 1 each; Refill: 0  -     HYDROcodone-acetaminophen (NORCO) 5-325 mg per tablet; Take 1 tablet by mouth every 6 (six) hours as needed for Pain.  Dispense: 28 tablet; Refill: 0  -     AIR CAST WALKER BOOT FOR HOME USE    Acute right ankle pain  -     methylPREDNISolone (MEDROL DOSEPACK) 4 mg tablet; use as directed  Dispense: 1 each; Refill: 0    Right foot pain  -     methylPREDNISolone (MEDROL DOSEPACK) 4 mg tablet; use as directed  Dispense: 1 each; Refill: 0  -     HYDROcodone-acetaminophen (NORCO) 5-325 mg per tablet; Take 1 tablet by mouth every 6 (six) hours as needed for Pain.  Dispense: 28 tablet; Refill: 0  -     AIR CAST WALKER BOOT FOR HOME USE    Follow up if symptoms worsen or fail to improve.    Procedures        CAM walker boot with weight bearing  Ice to painful area, 15 minutes at a time  Ace-wrap to help with swelling  No barefoot   Keep affected extremity elevated  while seated    I instructed patient to get the uric acid blood test which I ordered on his previous visit.    Counseling:     I provided patient education verbally regarding:   Patient diagnosis, treatment options, as well as alternatives, risks, and benefits.     We discussed causes of gout, diet for gout and oral medication for gout.  I discussed lowering the uric acid level to below 6 and as close to 5.5 to stop uric acid crystal formation.      This note was created using Dragon voice recognition software that occasionally misinterpreted phrases or words.

## 2022-10-27 NOTE — PATIENT INSTRUCTIONS
"What Is Gout?  Gout is a disease that affects the joints. Left untreated, it can lead to painful foot and joint deformities and even kidney problems. But, by treating gout early, you can relieve pain and help prevent future problems. Gout can usually be treated with medication and proper diet. In severe cases, surgery may be needed.    What causes gout?  Gout is caused by an excess of uric acid (a waste product made by the body). Uric acid is excreted by the kidneys. If the uric acid level in your blood rises too high, the uric acid may form crystals that collect in the joints, bringing on a gout attack. If you have many gout attacks, crystals may form large deposits called tophi. Tophi can damage joints and cause deformity.    Who is at risk for gout?  Men are more likely to have gout than women. But women can also be affected, mostly after menopause. Some health problems, such as obesity and high cholesterol, make gout more likely. And some medications, such as diuretics (water pills), can trigger a gout attack. People who drink a lot of alcohol are at high risk for gout. Certain foods can also trigger a gout attack.    Substances that may trigger a gout attack  To help prevent a gout attack, avoid these foods:  Alcohol (particularly beer, but also red wine and spirits)  Certain meats (red meat, processed meat, turkey)  Organ meats (kidney, liver, sweetbread)  Shellfish (lobster, crab, shrimp, scallop, mussel)  Certain fish (anchovy, sardine, herring, mackerel)     Treatment  Lifestyle changes, including weight loss, exercise, and quitting tobacco use  Reducing consumption of the food groups above as well as high fructose corn syrup, found in many foods including sodas and energy drinks  Changing non-essential medications that may contribute to gout (such as thiazide diuretics--"water pills")  Medications to reduce the amount of uric acid in the blood, such as allopurinol or others  Medications to treat acute " gout attacks, including NSAIDs (nonsteroidal anti-inflammatory medicines), steroids, and colchicine    Date Last Reviewed: 2/1/2016  © 1964-6985 [employer]. [address] [place] [address], [place] [address]. All rights reserved. This information is not intended as a substitute for professional medical care. Always follow your healthcare professional's instructions.       Gout Diet  Gout is a painful condition caused by an excess of uric acid, a waste product made by the body. Uric acid forms crystals that collect in the joints. The immune response to these crystals brings on symptoms of joint pain and swelling. This is called a gout attack. Often, medications and diet changes are combined to manage gout. Below are some guidelines for changing your diet to help you manage gout and prevent attacks. Your health care provider will help you determine the best eating plan for you.     Eating to manage gout  Weight loss for those who are overweight may help reduce gout attacks.    Eat less of these foods  Eating too many foods containing purines may raise the levels of uric acid in your body. This raises your risk for a gout attack. Try to limit these foods and drinks:  Alcohol, such as beer and red wine. You may be told to avoid alcohol completely.  Soft drinks that contain sugar or high fructose corn syrup  Certain fish, including anchovies, sardines, fish eggs, and herring  Shellfish  Certain meats, such as red meat, hot dogs, luncheon meats, and turkey  Organ meats, such as liver, kidneys, and sweetbreads  Legumes, such as dried beans and peas  Other high fat foods such as gravy, whole milk, and high fat cheeses  Vegetables such as asparagus, cauliflower, spinach, and mushrooms used to be thought to contribute to an increased risk for a gout attack, but recent studies show that high purine vegetables don't increase the risk for a gout attack.    Eat more of these foods  Other foods may be helpful for people with  gout. Add some of these foods to your diet:  Cherries contain chemicals that may lower uric acid.  Omega fatty acids. These are found in some fatty fish such as salmon, certain oils (flax, olive, or nut), and nuts themselves. Omega fatty acids may help prevent inflammation due to gout.  Dairy products that are low-fat or fat-free, such as cheese and yogurt  Complex carbohydrate foods, including whole grains, brown rice, oats, and beans  Coffee, in moderation  Water, approximately 64 ounces per day    Follow-up care  Follow up with your healthcare provider as advised.    When to seek medical advice  Call your healthcare provider right away if any of these occur:  Return of gout symptoms, usually at night:  Severe pain, swelling, and heat in a joint, especially the base of the big toe  Affected joint is hard to move  Skin of the affected joint is purple or red  Fever of 100.4°F (38°C) or higher  Pain that doesn't get better even with prescribed medicine     Date Last Reviewed: 1/12/2016  © 6754-3400 [employer]. [address] [place] [address]. All rights reserved. This information is not intended as a substitute for professional medical care. Always follow your healthcare professional's instructions.

## 2022-10-27 NOTE — TELEPHONE ENCOUNTER
Patient did not show up for his appointment this morning. Called patient - mailbox not set up, unable to leave message. Will attempt to reschedule.

## 2022-11-21 NOTE — TELEPHONE ENCOUNTER
----- Message from Tam Goode sent at 11/21/2022  4:56 PM CST -----  Contact: pt  Type:  RX Refill Request    Who Called:  pt   Refill or New Rx:  refill   RX Name and Strength:      aspirin 81 MG Chew  atorvastatin (LIPITOR) 80 MG tablet  HYDROcodone-acetaminophen (NORCO) 5-325 mg per tablet  ibuprofen (ADVIL,MOTRIN) 600 MG tablet  losartan (COZAAR) 25 MG tablet  methylPREDNISolone (MEDROL DOSEPACK) 4 mg tablet  metoprolol succinate (TOPROL-XL) 100 MG 24 hr tablet  nitroGLYCERIN (NITROSTAT) 0.4 MG SL tablet    How is the patient currently taking it? (ex. 1XDay):  1 x day   Is this a 30 day or 90 day RX:  90   Preferred Pharmacy with phone number:    [id number] - [place], [place] - [address] [facility]  [address] [facility] [address]  Phone: [phone] Fax: [phone]      Local or Mail Order:  local   Ordering Provider:  Dr. John Park Call Back Number:  [phone]    Additional Information:  pt needs a refill on medications today, states his chest is hurting. Please advise.

## 2022-11-21 NOTE — TELEPHONE ENCOUNTER
----- Message from Pia Katz sent at 11/21/2022  1:58 PM CST -----  Contact: called at [phone]  Type:  RX Refill Request    Who Called:  Pt  Refill or New Rx:  Refill  RX Name and Strength: (metoprolol succinate (TOPROL-XL) 100 MG 24 hr tablet)  (atorvastatin (LIPITOR) 80 MG tablet)  (aspirin 81 MG Chew)  (losartan (COZAAR) 25 MG tablet)  (HYDROcodone-acetaminophen (NORCO) 5-325 mg per tablet)  (methylPREDNISolone (MEDROL DOSEPACK) 4 mg tablet)  How is the patient currently taking it? (ex. 1XDay):  As directed  Is this a 30 day or 90 day RX:  As directed  Preferred Pharmacy with phone number:    [id number] - [place], [place] - [address] [facility]  [address] [facility] [address]  Phone: [phone] Fax: [phone]  Local or Mail Order:  Local  Ordering Provider:  Andrews Park Call Back Number:  [phone]  Additional Information:  Pt states he needs a refill for the meds listed above. Please call back and advice.

## 2022-11-22 ENCOUNTER — CLINICAL SUPPORT (OUTPATIENT)
Dept: CARDIOLOGY | Facility: CLINIC | Age: 57
End: 2022-11-22
Payer: COMMERCIAL

## 2022-11-22 RX ORDER — LOSARTAN POTASSIUM 25 MG/1
25 TABLET ORAL DAILY
Qty: 90 TABLET | Refills: 0 | Status: SHIPPED | OUTPATIENT
Start: 2022-11-22 | End: 2023-02-14 | Stop reason: SDUPTHER

## 2022-11-22 RX ORDER — ATORVASTATIN CALCIUM 80 MG/1
80 TABLET, FILM COATED ORAL NIGHTLY
Qty: 90 TABLET | Refills: 0 | Status: SHIPPED | OUTPATIENT
Start: 2022-11-22 | End: 2023-03-01 | Stop reason: SDUPTHER

## 2022-11-22 RX ORDER — METOPROLOL SUCCINATE 100 MG/1
100 TABLET, EXTENDED RELEASE ORAL DAILY
Qty: 90 TABLET | Refills: 0 | Status: SHIPPED | OUTPATIENT
Start: 2022-11-22 | End: 2023-03-01 | Stop reason: SDUPTHER

## 2022-11-22 RX ORDER — NITROGLYCERIN 0.4 MG/1
0.4 TABLET SUBLINGUAL EVERY 5 MIN PRN
Qty: 15 TABLET | Refills: 0 | Status: SHIPPED | OUTPATIENT
Start: 2022-11-22

## 2022-12-01 ENCOUNTER — HOSPITAL ENCOUNTER (EMERGENCY)
Facility: HOSPITAL | Age: 57
Discharge: HOME | End: 2022-12-01
Attending: EMERGENCY MEDICINE
Payer: COMMERCIAL

## 2022-12-01 VITALS
OXYGEN SATURATION: 100 % | SYSTOLIC BLOOD PRESSURE: 137 MMHG | TEMPERATURE: 99 F | HEIGHT: 70 IN | RESPIRATION RATE: 17 BRPM | WEIGHT: 180 LBS | HEART RATE: 63 BPM | BODY MASS INDEX: 25.77 KG/M2 | DIASTOLIC BLOOD PRESSURE: 86 MMHG

## 2022-12-01 DIAGNOSIS — M10.9 GOUT OF BIG TOE: Primary | ICD-10-CM

## 2022-12-01 PROCEDURE — 63600175 PHARM REV CODE 636 W HCPCS: Performed by: PHYSICIAN ASSISTANT

## 2022-12-01 PROCEDURE — 99284 EMERGENCY DEPT VISIT MOD MDM: CPT

## 2022-12-01 RX ORDER — PREDNISONE 20 MG/1
40 TABLET ORAL DAILY
Qty: 10 TABLET | Refills: 0 | Status: SHIPPED | OUTPATIENT
Start: 2022-12-01 | End: 2022-12-06

## 2022-12-01 RX ORDER — ALLOPURINOL 300 MG/1
300 TABLET ORAL DAILY
Qty: 30 TABLET | Refills: 3 | Status: SHIPPED | OUTPATIENT
Start: 2022-12-01 | End: 2022-12-31

## 2022-12-01 RX ORDER — ACETAMINOPHEN 500 MG
1000 TABLET ORAL
Status: DISCONTINUED | OUTPATIENT
Start: 2022-12-01 | End: 2022-12-01 | Stop reason: HOSPADM

## 2022-12-01 RX ORDER — HYDROCODONE BITARTRATE AND ACETAMINOPHEN 10; 325 MG/1; MG/1
1 TABLET ORAL EVERY 6 HOURS PRN
Qty: 12 TABLET | Refills: 0 | Status: SHIPPED | OUTPATIENT
Start: 2022-12-01 | End: 2022-12-08

## 2022-12-01 RX ORDER — PREDNISONE 20 MG/1
40 TABLET ORAL
Status: COMPLETED | OUTPATIENT
Start: 2022-12-01 | End: 2022-12-01

## 2022-12-01 RX ADMIN — PREDNISONE 40 MG: 20 TABLET ORAL at 02:12

## 2022-12-01 NOTE — FIRST PROVIDER EVALUATION
Emergency Department TeleTriage Encounter Note      CHIEF COMPLAINT    Chief Complaint   Patient presents with    Foot Pain     Right foot pain -- pain similar to previous episodes of gout       VITAL SIGNS   Initial Vitals [12/01/22 1321]   BP Pulse Resp Temp SpO2   112/74 67 17 98.7 °F (37.1 °C) 98 %      MAP       --            ALLERGIES    Review of patient's allergies indicates:  No Known Allergies    PROVIDER TRIAGE NOTE  This is a teletriage evaluation of a 57 y.o. male presenting to the ED with c/o recurrence of suspected gout in R foot- h/o same, atraumatic pain to ankle, foot. Previously treated with steroids last month. H/o CAD. Cr nml.     PE: cannot see foot. Non-toxic/well-appearing. No respiratory distress, speaks in full sentences without issue. No active emesis nor cough. Normal eye contact and mentation.     Plan: meds. Further/augmented workup at discretion of examining provider.     All ED beds are full at present; patient notified of this status.  Patient seen and medically screened by GISSELLE via teletriage. Orders initiated at triage to expedite care.  Patient is stable and will be placed in an ED bed when available.  Care will be transferred to an alternate provider when patient has been placed in an Exam Room further exam, additional orders, and disposition.         ORDERS  Labs Reviewed - No data to display    ED Orders (720h ago, onward)      Start Ordered     Status Ordering Provider    12/01/22 1345 12/01/22 1336  predniSONE tablet 40 mg  ED 1 Time         Ordered DANIEL WOLF    12/01/22 1345 12/01/22 1336  acetaminophen tablet 1,000 mg  ED 1 Time         Ordered DANIEL WOLF              Virtual Visit Note: The provider triage portion of this emergency department evaluation and documentation was performed via [employer], a HIPAA-compliant telemedicine application, in concert with a tele-presenter in the room. A face to face patient evaluation with one of my colleagues will  occur once the patient is placed in an emergency department room.      DISCLAIMER: This note was prepared with [employer] voice recognition transcription software. Garbled syntax, mangled pronouns, and other bizarre constructions may be attributed to that software system.

## 2022-12-01 NOTE — ED PROVIDER NOTES
Encounter Date: 12/1/2022       History     Chief Complaint   Patient presents with    Foot Pain     Right foot pain -- pain similar to previous episodes of gout     57-year-old male with a history of gout CAD hyperlipidemia hypertension presents with 1 day of right great toe pain consistent with multiple prior episodes of gout.  Ran out of allopurinol 2 weeks ago.  No other complaints.  Patient had an ER visit in October and then several days later was able to follow-up with podiatry.    The history is provided by the patient.   Review of patient's allergies indicates:  No Known Allergies  Past Medical History:   Diagnosis Date    Coronary artery disease     COVID-19     Gout     Hyperlipidemia     Hypertension      Past Surgical History:   Procedure Laterality Date    CORONARY ANGIOGRAPHY INCLUDING BYPASS GRAFTS WITH CATHETERIZATION OF LEFT HEART N/A 9/8/2022    Procedure: ANGIOGRAM, CORONARY, INCLUDING BYPASS GRAFT, WITH LEFT HEART CATHETERIZATION;  Surgeon: Andrews Lopez MD;  Location: [facility] CATH/EP LAB;  Service: Cardiology;  Laterality: N/A;    CORONARY ARTERY BYPASS GRAFT       Family History   Problem Relation Age of Onset    Heart disease Father      Social History     Tobacco Use    Smoking status: Former    Smokeless tobacco: Never   Substance Use Topics    Alcohol use: Not Currently    Drug use: Never     Review of Systems   Constitutional:  Negative for fever.   Musculoskeletal:  Positive for arthralgias and gait problem.   Skin:  Negative for rash and wound.     Physical Exam     Initial Vitals [12/01/22 1321]   BP Pulse Resp Temp SpO2   112/74 67 17 98.7 °F (37.1 °C) 98 %      MAP       --         Physical Exam    Nursing note and vitals reviewed.  Constitutional: He appears well-developed and well-nourished. He is not diaphoretic.  Non-toxic appearance. He does not have a sickly appearance. He does not appear ill. No distress.   HENT:   Head: Normocephalic and atraumatic.   Eyes: EOM are normal.    Neck: Neck supple.   Normal range of motion.  Pulmonary/Chest: No respiratory distress.   Musculoskeletal:      Cervical back: Normal range of motion and neck supple. No rigidity. Normal range of motion.      Comments: Swelling tenderness decreased range of motion right great toe     Neurological: He is alert and oriented to person, place, and time.   Skin: Skin is warm and dry. No rash noted.   Psychiatric: He has a normal mood and affect. His behavior is normal. Judgment and thought content normal.       ED Course   Procedures  Labs Reviewed - No data to display       Imaging Results    None          Medications   acetaminophen tablet 1,000 mg (1,000 mg Oral Not Given 12/1/22 1345)   predniSONE tablet 40 mg (40 mg Oral Given 12/1/22 1415)                 ED Course as of 12/01/22 1530   Thu Dec 01, 2022   1504 BP: 112/74 [EF]   1504 Temp: 98.7 °F (37.1 °C) [EF]   1504 Temp src: Oral [EF]   1504 Pulse: 67 [EF]   1504 Resp: 17 [EF]   1504 SpO2: 98 % [EF]      ED Course User Index  [EF] Jamie Alba MD                 Clinical Impression:   Final diagnoses:  [M10.9] Gout of big toe (Primary)        ED Disposition Condition    Discharge Stable          ED Prescriptions       Medication Sig Dispense Start Date End Date Auth. Provider    HYDROcodone-acetaminophen (NORCO)  mg per tablet Take 1 tablet by mouth every 6 (six) hours as needed for Pain. 12 tablet 12/1/2022 -- Jamie Alba MD    predniSONE (DELTASONE) 20 MG tablet Take 2 tablets (40 mg total) by mouth once daily. for 5 days 10 tablet 12/1/2022 12/6/2022 Jamie Alba MD    allopurinoL (ZYLOPRIM) 300 MG tablet Take 1 tablet (300 mg total) by mouth once daily. Take only after acute gout flare resolved 30 tablet 12/1/2022 12/31/2022 Jamie Alba MD          Follow-up Information       Follow up With Specialties Details Why Contact Info    [facility] Emergency Dept Emergency Medicine  As needed, If symptoms worsen [address] [place] [address]  Drive  [facility] [address] [phone]    Sanjiv Blue DPM Podiatry, Surgery, Wound Care Schedule an appointment as soon as possible for a visit   [address] [facility]  [address]  [facility] [address] [phone]              Patient presents with right great toe pain consistent with multiple prior episodes of gout.  Out of his allopurinol.  Patient was given a prescription of pain medicine and prednisone.  Advised to start allopurinol only after the acute flare resolves.  Referred to Podiatry.     Jamie Alba MD  12/01/22 9539

## 2022-12-06 ENCOUNTER — TELEPHONE (OUTPATIENT)
Dept: PODIATRY | Facility: CLINIC | Age: 57
End: 2022-12-06
Payer: COMMERCIAL

## 2022-12-08 ENCOUNTER — OFFICE VISIT (OUTPATIENT)
Dept: PODIATRY | Facility: CLINIC | Age: 57
End: 2022-12-08
Payer: COMMERCIAL

## 2022-12-08 VITALS — HEIGHT: 70 IN | BODY MASS INDEX: 25.77 KG/M2 | WEIGHT: 180 LBS

## 2022-12-08 DIAGNOSIS — M10.071 ACUTE IDIOPATHIC GOUT OF RIGHT ANKLE: ICD-10-CM

## 2022-12-08 DIAGNOSIS — M25.572 ARTHRALGIA OF BOTH ANKLES: ICD-10-CM

## 2022-12-08 DIAGNOSIS — M25.571 ACUTE RIGHT ANKLE PAIN: ICD-10-CM

## 2022-12-08 DIAGNOSIS — M25.571 ARTHRALGIA OF BOTH ANKLES: ICD-10-CM

## 2022-12-08 DIAGNOSIS — M79.671 RIGHT FOOT PAIN: ICD-10-CM

## 2022-12-08 DIAGNOSIS — M10.072 ACUTE IDIOPATHIC GOUT OF LEFT ANKLE: Primary | ICD-10-CM

## 2022-12-08 PROCEDURE — 3044F PR MOST RECENT HEMOGLOBIN A1C LEVEL <7.0%: ICD-10-PCS | Mod: CPTII,S$GLB,, | Performed by: PODIATRIST

## 2022-12-08 PROCEDURE — 1160F RVW MEDS BY RX/DR IN RCRD: CPT | Mod: CPTII,S$GLB,, | Performed by: PODIATRIST

## 2022-12-08 PROCEDURE — 20605 PR DRAIN/INJECT INTERMEDIATE JOINT/BURSA: ICD-10-PCS | Mod: 50,S$GLB,, | Performed by: PODIATRIST

## 2022-12-08 PROCEDURE — 4010F ACE/ARB THERAPY RXD/TAKEN: CPT | Mod: CPTII,S$GLB,, | Performed by: PODIATRIST

## 2022-12-08 PROCEDURE — 3044F HG A1C LEVEL LT 7.0%: CPT | Mod: CPTII,S$GLB,, | Performed by: PODIATRIST

## 2022-12-08 PROCEDURE — 20605 DRAIN/INJ JOINT/BURSA W/O US: CPT | Mod: 50,S$GLB,, | Performed by: PODIATRIST

## 2022-12-08 PROCEDURE — 99214 PR OFFICE/OUTPT VISIT, EST, LEVL IV, 30-39 MIN: ICD-10-PCS | Mod: 25,S$GLB,, | Performed by: PODIATRIST

## 2022-12-08 PROCEDURE — 1160F PR REVIEW ALL MEDS BY PRESCRIBER/CLIN PHARMACIST DOCUMENTED: ICD-10-PCS | Mod: CPTII,S$GLB,, | Performed by: PODIATRIST

## 2022-12-08 PROCEDURE — 3008F BODY MASS INDEX DOCD: CPT | Mod: CPTII,S$GLB,, | Performed by: PODIATRIST

## 2022-12-08 PROCEDURE — 4010F PR ACE/ARB THEARPY RXD/TAKEN: ICD-10-PCS | Mod: CPTII,S$GLB,, | Performed by: PODIATRIST

## 2022-12-08 PROCEDURE — 3008F PR BODY MASS INDEX (BMI) DOCUMENTED: ICD-10-PCS | Mod: CPTII,S$GLB,, | Performed by: PODIATRIST

## 2022-12-08 PROCEDURE — 99214 OFFICE O/P EST MOD 30 MIN: CPT | Mod: 25,S$GLB,, | Performed by: PODIATRIST

## 2022-12-08 PROCEDURE — 1159F PR MEDICATION LIST DOCUMENTED IN MEDICAL RECORD: ICD-10-PCS | Mod: CPTII,S$GLB,, | Performed by: PODIATRIST

## 2022-12-08 PROCEDURE — 1159F MED LIST DOCD IN RCRD: CPT | Mod: CPTII,S$GLB,, | Performed by: PODIATRIST

## 2022-12-08 RX ORDER — DEXAMETHASONE SODIUM PHOSPHATE 4 MG/ML
4 INJECTION, SOLUTION INTRA-ARTICULAR; INTRALESIONAL; INTRAMUSCULAR; INTRAVENOUS; SOFT TISSUE
Status: COMPLETED | OUTPATIENT
Start: 2022-12-08 | End: 2022-12-08

## 2022-12-08 RX ORDER — BUPIVACAINE HYDROCHLORIDE 5 MG/ML
1.5 INJECTION, SOLUTION PERINEURAL
Status: COMPLETED | OUTPATIENT
Start: 2022-12-08 | End: 2022-12-08

## 2022-12-08 RX ORDER — HYDROCODONE BITARTRATE AND ACETAMINOPHEN 5; 325 MG/1; MG/1
1 TABLET ORAL EVERY 6 HOURS PRN
Qty: 28 TABLET | Refills: 0 | Status: SHIPPED | OUTPATIENT
Start: 2022-12-08 | End: 2023-02-15

## 2022-12-08 RX ORDER — METHYLPREDNISOLONE ACETATE 40 MG/ML
20 INJECTION, SUSPENSION INTRA-ARTICULAR; INTRALESIONAL; INTRAMUSCULAR; SOFT TISSUE
Status: COMPLETED | OUTPATIENT
Start: 2022-12-08 | End: 2022-12-08

## 2022-12-08 RX ADMIN — DEXAMETHASONE SODIUM PHOSPHATE 4 MG: 4 INJECTION, SOLUTION INTRA-ARTICULAR; INTRALESIONAL; INTRAMUSCULAR; INTRAVENOUS; SOFT TISSUE at 04:12

## 2022-12-08 RX ADMIN — METHYLPREDNISOLONE ACETATE 20 MG: 40 INJECTION, SUSPENSION INTRA-ARTICULAR; INTRALESIONAL; INTRAMUSCULAR; SOFT TISSUE at 04:12

## 2022-12-08 RX ADMIN — BUPIVACAINE HYDROCHLORIDE 7.5 MG: 5 INJECTION, SOLUTION PERINEURAL at 04:12

## 2022-12-08 NOTE — PROGRESS NOTES
"  [address] [facility] [address]  [place] [address]  Phone: [phone]   [phone]    Patient's PCP:Primary Doctor No  Referring Provider: No ref. provider found    Subjective:      Chief Complaint:: Ankle Pain (Bilateral lateral ankles) and Foot Pain (Ball of right plantar foot)    Ankle Pain   Pertinent negatives include no numbness.   Foot Pain  Associated symptoms include arthralgias and joint swelling. Pertinent negatives include no abdominal pain, chest pain, chills, coughing, fatigue, fever, headaches, nausea, numbness, rash or weakness.   Irwin Richard is a 57 y.o. male who presents today with a complaint of pain in his lateral ankles.  He has had to take off of work for the past few days due to the pain.  Also presents with right plantar foot pain in the ball of the foot.   All of this has been going on for about 1 week now.  No trauma that he can recall.  Patient also states he is starting to have pain in his bilateral knees.    He has since adjusted his diet to eliminate all meats in an attempt to prevent gout flare ups.      He did go to ER last week and was directed to our office here.      Vitals:    12/08/22 1634   Weight: 81.6 kg (180 lb)   Height: 5' 10" (1.778 m)   PainSc:   8      Shoe Size:     Past Surgical History:   Procedure Laterality Date    CORONARY ANGIOGRAPHY INCLUDING BYPASS GRAFTS WITH CATHETERIZATION OF LEFT HEART N/A 9/8/2022    Procedure: ANGIOGRAM, CORONARY, INCLUDING BYPASS GRAFT, WITH LEFT HEART CATHETERIZATION;  Surgeon: Andrews Lopez MD;  Location: [facility] CATH/EP LAB;  Service: Cardiology;  Laterality: N/A;    CORONARY ARTERY BYPASS GRAFT       Past Medical History:   Diagnosis Date    Coronary artery disease     COVID-19     Gout     Hyperlipidemia     Hypertension      Family History   Problem Relation Age of Onset    Heart disease Father         Social History:   Marital Status: Single  Alcohol History:  reports that he does not currently use alcohol.  Tobacco " History:  reports that he has quit smoking. He has never used smokeless tobacco.  Drug History:  reports no history of drug use.    Review of patient's allergies indicates:  No Known Allergies    Current Outpatient Medications   Medication Sig Dispense Refill    allopurinoL (ZYLOPRIM) 300 MG tablet Take 1 tablet (300 mg total) by mouth once daily. Take only after acute gout flare resolved 30 tablet 3    aspirin 81 MG Chew Take 1 tablet (81 mg total) by mouth once daily. 90 tablet 0    atorvastatin (LIPITOR) 80 MG tablet Take 1 tablet (80 mg total) by mouth every evening. 90 tablet 0    losartan (COZAAR) 25 MG tablet Take 1 tablet (25 mg total) by mouth once daily. 90 tablet 0    metoprolol succinate (TOPROL-XL) 100 MG 24 hr tablet Take 1 tablet (100 mg total) by mouth once daily. 90 tablet 0    nitroGLYCERIN (NITROSTAT) 0.4 MG SL tablet Place 1 tablet (0.4 mg total) under the tongue every 5 (five) minutes as needed for Chest pain. 15 tablet 0    HYDROcodone-acetaminophen (NORCO) 5-325 mg per tablet Take 1 tablet by mouth every 6 (six) hours as needed for Pain. 28 tablet 0     Current Facility-Administered Medications   Medication Dose Route Frequency Provider Last Rate Last Admin    acetaminophen tablet 650 mg  650 mg Oral Once PRN Cristina Hammond MD           Review of Systems   Constitutional:  Negative for chills, fatigue, fever and unexpected weight change.   HENT:  Negative for hearing loss and trouble swallowing.    Eyes:  Negative for photophobia and visual disturbance.   Respiratory:  Negative for cough, shortness of breath and wheezing.    Cardiovascular:  Negative for chest pain, palpitations and leg swelling.   Gastrointestinal:  Negative for abdominal pain and nausea.   Genitourinary:  Negative for dysuria and frequency.   Musculoskeletal:  Positive for arthralgias, back pain, gait problem and joint swelling.   Skin:  Negative for rash and wound.   Neurological:  Negative for tremors, seizures,  weakness, numbness and headaches.   Hematological:  Bruises/bleeds easily.       Objective:        Physical Exam:   Foot Exam    General  Orientation: alert and oriented to person, place, and time   Affect: appropriate   Gait: unimpaired       Right Foot/Ankle     Inspection and Palpation  Ecchymosis: none  Tenderness: great toe metatarsophalangeal joint and ankle   Swelling: ankle   Arch: normal  Hallux valgus: yes  Skin Exam: skin intact; no drainage, no ulcer and no erythema   Neurovascular  Dorsalis pedis: 2+  Posterior tibial: 2+  Capillary Refill: 2+  Varicose veins: not present  Saphenous nerve sensation: normal  Tibial nerve sensation: normal  Superficial peroneal nerve sensation: normal  Deep peroneal nerve sensation: normal  Sural nerve sensation: normal    Edema  Type of edema: non-pitting    Muscle Strength  Ankle dorsiflexion: 5  Ankle plantar flexion: 5  Ankle inversion: 5  Ankle eversion: 5  Great toe extension: 5  Great toe flexion: 5    Range of Motion    Normal right ankle ROM  Passive  Ankle dorsiflexion: pain  Ankle plantar flexion: pain  1st MTP extension: pain  1st MTP flexion: pain      Tests  Anterior drawer: negative   Talar tilt: negative   PT Tinel's sign: negative    Paresthesia: negative    Left Foot/Ankle      Inspection and Palpation  Ecchymosis: none  Tenderness: ankle   Swelling: ankle   Arch: normal  Hallux valgus: yes  Skin Exam: skin intact; no drainage, no ulcer and no erythema   Neurovascular  Dorsalis pedis: 2+  Posterior tibial: 2+  Capillary refill: 2+  Varicose veins: not present  Saphenous nerve sensation: normal  Tibial nerve sensation: normal  Superficial peroneal nerve sensation: normal  Deep peroneal nerve sensation: normal  Sural nerve sensation: normal    Edema  Type of edema: non-pitting    Muscle Strength  Ankle dorsiflexion: 5  Ankle plantar flexion: 5  Ankle inversion: 5  Ankle eversion: 5  Great toe extension: 5  Great toe flexion: 5    Range of Motion    Normal  left ankle ROM  Passive  Ankle dorsiflexion: pain  Plantar flexion: pain      Tests  Anterior drawer: negative   Talar tilt: negative   PT Tinel's sign: negative  Paresthesia: negative    Physical Exam  Cardiovascular:      Pulses:           Dorsalis pedis pulses are 2+ on the right side and 2+ on the left side.        Posterior tibial pulses are 2+ on the right side and 2+ on the left side.   Musculoskeletal:      Right ankle: Swelling present. Tenderness present.      Left ankle: Swelling present. Tenderness present.      Right foot: Bunion present.      Left foot: Bunion present.   Feet:      Right foot:      Skin integrity: No ulcer or erythema.      Left foot:      Skin integrity: No ulcer or erythema.             Right Ankle/Foot Exam     Tenderness   The patient is tender to palpation of the great toe metatarsophalangeal joint.    Range of Motion   The patient has normal right ankle ROM.    Left Ankle/Foot Exam     Range of Motion   The patient has normal left ankle ROM.       Muscle Strength   Right Lower Extremity   Ankle Dorsiflexion:  5   Plantar flexion:  5/5  Left Lower Extremity   Ankle Dorsiflexion:  5   Plantar flexion:  5/5     Vascular Exam     Right Pulses  Dorsalis Pedis:      2+  Posterior Tibial:      2+        Left Pulses  Dorsalis Pedis:      2+  Posterior Tibial:      2+         Imaging:  None           Assessment:       1. Acute idiopathic gout of left ankle    2. Acute idiopathic gout of right ankle    3. Right foot pain    4. Acute right ankle pain    5. Arthralgia of both ankles      Plan:   Acute idiopathic gout of left ankle  -     Uric Acid; Future; Expected date: 12/08/2022    Acute idiopathic gout of right ankle  -     Uric Acid; Future; Expected date: 12/08/2022    Right foot pain  -     Uric Acid; Future; Expected date: 12/08/2022  -     HYDROcodone-acetaminophen (NORCO) 5-325 mg per tablet; Take 1 tablet by mouth every 6 (six) hours as needed for Pain.  Dispense: 28 tablet;  Refill: 0    Acute right ankle pain  -     Uric Acid; Future; Expected date: 12/08/2022  -     HYDROcodone-acetaminophen (NORCO) 5-325 mg per tablet; Take 1 tablet by mouth every 6 (six) hours as needed for Pain.  Dispense: 28 tablet; Refill: 0    Arthralgia of both ankles  -     BUPivacaine injection 7.5 mg  -     dexAMETHasone injection 4 mg  -     methylPREDNISolone acetate injection 20 mg  -     BUPivacaine injection 7.5 mg  -     dexAMETHasone injection 4 mg  -     Ambulatory referral/consult to Rheumatology; Future; Expected date: 12/15/2022  -     Uric Acid; Future; Expected date: 12/08/2022    Other orders  -     methylPREDNISolone acetate injection 20 mg    Follow up if symptoms worsen or fail to improve.    Procedures Informed consent was obtained.  Time-out was called.  The area was prepped with alcohol, and a steroid injection was performed at left ankle using 1.5 cc of 0.5% Marcaine w/out epi, 1 cc Dexamethasone, 0.5 cc Methylprednisolone. Patient tolerated the procedure well.       Informed consent was obtained.  Time-out was called.  The area was prepped with alcohol, and a steroid injection was performed at right ankle using 1.5 cc of 0.5% Marcaine w/out epi, 1 cc Dexamethasone, 0.5 cc Methylprednisolone. Patient tolerated the procedure well.           Going to get the patient steroid injections today.  I am going to send in an order for an updated uric acid level.  Also discussed with the patient that I think he needs to be evaluated by Rheumatology to see if all of his symptoms are gout related or if he has an underlying inflammatory arthritis.  I am going to place a referral today.    Counseling:     I provided patient education verbally regarding:   Patient diagnosis, treatment options, as well as alternatives, risks, and benefits.     causes of gout, diet for gout and oral medication for gout.  I discussed lowering the uric acid level to below 6 and as close to 5.5 to stop uric acid crystal  formation.  I discussed the conservative treatent of arthritis consisiting of shoe modification, OTC NSAID, cortisone shots, a series of supartz injections, avoiding painful activities and common sense.  I explained that the arthritis may become more painful causng more disability and the possibility of further treatment.  Also discussed may need evaluation by Rheumatologist for possible inflammatory arthritis.      This note was created using Dragon voice recognition software that occasionally misinterpreted phrases or words.

## 2023-01-11 ENCOUNTER — TELEPHONE (OUTPATIENT)
Dept: PODIATRY | Facility: CLINIC | Age: 58
End: 2023-01-11
Payer: COMMERCIAL

## 2023-01-18 ENCOUNTER — HOSPITAL ENCOUNTER (EMERGENCY)
Facility: HOSPITAL | Age: 58
Discharge: LEFT BEFORE DISCHARGE | End: 2023-01-18
Payer: COMMERCIAL

## 2023-01-18 VITALS
HEART RATE: 57 BPM | RESPIRATION RATE: 18 BRPM | BODY MASS INDEX: 25.05 KG/M2 | WEIGHT: 175 LBS | HEIGHT: 70 IN | DIASTOLIC BLOOD PRESSURE: 87 MMHG | TEMPERATURE: 98 F | SYSTOLIC BLOOD PRESSURE: 136 MMHG | OXYGEN SATURATION: 97 %

## 2023-01-18 DIAGNOSIS — R07.89 CHEST DISCOMFORT: ICD-10-CM

## 2023-01-18 DIAGNOSIS — R07.9 CHEST PAIN: ICD-10-CM

## 2023-01-18 LAB
ALBUMIN SERPL BCP-MCNC: 4.2 G/DL (ref 3.5–5.2)
ALP SERPL-CCNC: 77 U/L (ref 55–135)
ALT SERPL W/O P-5'-P-CCNC: 22 U/L (ref 10–44)
ANION GAP SERPL CALC-SCNC: 11 MMOL/L (ref 8–16)
AST SERPL-CCNC: 20 U/L (ref 10–40)
BASOPHILS # BLD AUTO: 0.02 K/UL (ref 0–0.2)
BASOPHILS NFR BLD: 0.3 % (ref 0–1.9)
BILIRUB SERPL-MCNC: 0.6 MG/DL (ref 0.1–1)
BNP SERPL-MCNC: 91 PG/ML (ref 0–99)
BUN SERPL-MCNC: 9 MG/DL (ref 6–20)
CALCIUM SERPL-MCNC: 9.6 MG/DL (ref 8.7–10.5)
CHLORIDE SERPL-SCNC: 108 MMOL/L (ref 95–110)
CO2 SERPL-SCNC: 25 MMOL/L (ref 23–29)
CREAT SERPL-MCNC: 0.9 MG/DL (ref 0.5–1.4)
DIFFERENTIAL METHOD: ABNORMAL
EOSINOPHIL # BLD AUTO: 0.2 K/UL (ref 0–0.5)
EOSINOPHIL NFR BLD: 3.6 % (ref 0–8)
ERYTHROCYTE [DISTWIDTH] IN BLOOD BY AUTOMATED COUNT: 11.8 % (ref 11.5–14.5)
EST. GFR  (NO RACE VARIABLE): >60 ML/MIN/1.73 M^2
GLUCOSE SERPL-MCNC: 86 MG/DL (ref 70–110)
HCT VFR BLD AUTO: 40.2 % (ref 40–54)
HGB BLD-MCNC: 13.5 G/DL (ref 14–18)
IMM GRANULOCYTES # BLD AUTO: 0.02 K/UL (ref 0–0.04)
IMM GRANULOCYTES NFR BLD AUTO: 0.3 % (ref 0–0.5)
LYMPHOCYTES # BLD AUTO: 2.4 K/UL (ref 1–4.8)
LYMPHOCYTES NFR BLD: 34.9 % (ref 18–48)
MCH RBC QN AUTO: 29.9 PG (ref 27–31)
MCHC RBC AUTO-ENTMCNC: 33.6 G/DL (ref 32–36)
MCV RBC AUTO: 89 FL (ref 82–98)
MONOCYTES # BLD AUTO: 0.6 K/UL (ref 0.3–1)
MONOCYTES NFR BLD: 9.5 % (ref 4–15)
NEUTROPHILS # BLD AUTO: 3.5 K/UL (ref 1.8–7.7)
NEUTROPHILS NFR BLD: 51.4 % (ref 38–73)
NRBC BLD-RTO: 0 /100 WBC
PLATELET # BLD AUTO: 162 K/UL (ref 150–450)
PMV BLD AUTO: 10 FL (ref 9.2–12.9)
POTASSIUM SERPL-SCNC: 4.3 MMOL/L (ref 3.5–5.1)
PROT SERPL-MCNC: 7.4 G/DL (ref 6–8.4)
RBC # BLD AUTO: 4.52 M/UL (ref 4.6–6.2)
SODIUM SERPL-SCNC: 144 MMOL/L (ref 136–145)
TROPONIN I SERPL DL<=0.01 NG/ML-MCNC: 0.01 NG/ML (ref 0–0.03)
WBC # BLD AUTO: 6.74 K/UL (ref 3.9–12.7)

## 2023-01-18 PROCEDURE — 99900041 HC LEFT WITHOUT BEING SEEN- EMERGENCY

## 2023-01-18 PROCEDURE — 80053 COMPREHEN METABOLIC PANEL: CPT | Performed by: PHYSICIAN ASSISTANT

## 2023-01-18 PROCEDURE — 84484 ASSAY OF TROPONIN QUANT: CPT | Performed by: PHYSICIAN ASSISTANT

## 2023-01-18 PROCEDURE — 83880 ASSAY OF NATRIURETIC PEPTIDE: CPT | Performed by: PHYSICIAN ASSISTANT

## 2023-01-18 PROCEDURE — 93010 EKG 12-LEAD: ICD-10-PCS | Mod: ,,, | Performed by: SPECIALIST

## 2023-01-18 PROCEDURE — 36415 COLL VENOUS BLD VENIPUNCTURE: CPT | Performed by: PHYSICIAN ASSISTANT

## 2023-01-18 PROCEDURE — 93005 ELECTROCARDIOGRAM TRACING: CPT

## 2023-01-18 PROCEDURE — 93010 ELECTROCARDIOGRAM REPORT: CPT | Mod: ,,, | Performed by: SPECIALIST

## 2023-01-18 PROCEDURE — 85025 COMPLETE CBC W/AUTO DIFF WBC: CPT | Performed by: PHYSICIAN ASSISTANT

## 2023-01-18 RX ORDER — ASPIRIN 325 MG
325 TABLET ORAL
Status: DISCONTINUED | OUTPATIENT
Start: 2023-01-18 | End: 2023-01-18 | Stop reason: HOSPADM

## 2023-01-18 NOTE — FIRST PROVIDER EVALUATION
Emergency Department TeleTriage Encounter Note      CHIEF COMPLAINT    Chief Complaint   Patient presents with    Chest Pain     S/S x 30-40 min PTA - indicates Lt-sided chest pain that is increased with palpation & deep breathing       VITAL SIGNS   Initial Vitals [01/18/23 1638]   BP Pulse Resp Temp SpO2   136/87 (!) 57 18 97.5 °F (36.4 °C) 97 %      MAP       --            ALLERGIES    Review of patient's allergies indicates:  No Known Allergies    PROVIDER TRIAGE NOTE  Patient presents with intermittent chest pain, shortness of breath, nausea and diaphoresis that started about 1 hour prior to arrival.       ORDERS  Labs Reviewed - No data to display    ED Orders (720h ago, onward)      Start Ordered     Status Ordering Provider    01/18/23 1643 01/18/23 1642  EKG 12-lead  Once         Completed by VAL JANE on 1/18/2023 at  4:55 PM DRAKE CORDOBA              Virtual Visit Note: The provider triage portion of this emergency department evaluation and documentation was performed via [employer], a HIPAA-compliant telemedicine application, in concert with a tele-presenter in the room. A face to face patient evaluation with one of my colleagues will occur once the patient is placed in an emergency department room.      DISCLAIMER: This note was prepared with [employer] voice recognition transcription software. Garbled syntax, mangled pronouns, and other bizarre constructions may be attributed to that software system.

## 2023-02-14 RX ORDER — LOSARTAN POTASSIUM 25 MG/1
25 TABLET ORAL DAILY
Qty: 90 TABLET | Refills: 0 | Status: SHIPPED | OUTPATIENT
Start: 2023-02-14 | End: 2023-05-26 | Stop reason: SDUPTHER

## 2023-02-14 NOTE — TELEPHONE ENCOUNTER
----- Message from Rony May sent at 2/14/2023  8:55 AM CST -----  Contact: pt at [phone]  Type:  RX Refill Request    Who Called:  pt  Refill or New Rx:  refill  RX Name and Strength:  losartan (COZAAR) 25 MG tablet, metoprolol succinate (TOPROL-XL) 100 MG 24 hr tablet  How is the patient currently taking it? (ex. 1XDay):  1XDay  Is this a 30 day or 90 day RX:  90  Preferred Pharmacy with phone number:    [id number] - [place], [place] - [address]  [address] [place] [address]  Phone: [phone] Fax: [phone]  Local or Mail Order:  Local  Ordering Provider:  John Park Call Back Number:  [phone]  Additional Information:  pt is calling the office due to him running low on his osartan (COZAAR) 25 MG tablet, metoprolol succinate (TOPROL-XL) 100 MG 24 hr tablet. Please call back and advise.

## 2023-02-15 ENCOUNTER — OFFICE VISIT (OUTPATIENT)
Dept: PODIATRY | Facility: CLINIC | Age: 58
End: 2023-02-15
Payer: COMMERCIAL

## 2023-02-15 VITALS — BODY MASS INDEX: 25.05 KG/M2 | WEIGHT: 175 LBS | HEIGHT: 70 IN

## 2023-02-15 DIAGNOSIS — G89.29 CHRONIC BILATERAL LOW BACK PAIN WITHOUT SCIATICA: ICD-10-CM

## 2023-02-15 DIAGNOSIS — M54.50 CHRONIC BILATERAL LOW BACK PAIN WITHOUT SCIATICA: ICD-10-CM

## 2023-02-15 DIAGNOSIS — M10.071 ACUTE IDIOPATHIC GOUT INVOLVING TOE OF RIGHT FOOT: Primary | ICD-10-CM

## 2023-02-15 DIAGNOSIS — M79.671 RIGHT FOOT PAIN: ICD-10-CM

## 2023-02-15 PROCEDURE — 1160F RVW MEDS BY RX/DR IN RCRD: CPT | Mod: CPTII,S$GLB,, | Performed by: PODIATRIST

## 2023-02-15 PROCEDURE — 99214 PR OFFICE/OUTPT VISIT, EST, LEVL IV, 30-39 MIN: ICD-10-PCS | Mod: S$GLB,,, | Performed by: PODIATRIST

## 2023-02-15 PROCEDURE — 1160F PR REVIEW ALL MEDS BY PRESCRIBER/CLIN PHARMACIST DOCUMENTED: ICD-10-PCS | Mod: CPTII,S$GLB,, | Performed by: PODIATRIST

## 2023-02-15 PROCEDURE — 99214 OFFICE O/P EST MOD 30 MIN: CPT | Mod: S$GLB,,, | Performed by: PODIATRIST

## 2023-02-15 PROCEDURE — 4010F PR ACE/ARB THEARPY RXD/TAKEN: ICD-10-PCS | Mod: CPTII,S$GLB,, | Performed by: PODIATRIST

## 2023-02-15 PROCEDURE — 3008F BODY MASS INDEX DOCD: CPT | Mod: CPTII,S$GLB,, | Performed by: PODIATRIST

## 2023-02-15 PROCEDURE — 4010F ACE/ARB THERAPY RXD/TAKEN: CPT | Mod: CPTII,S$GLB,, | Performed by: PODIATRIST

## 2023-02-15 PROCEDURE — 1159F MED LIST DOCD IN RCRD: CPT | Mod: CPTII,S$GLB,, | Performed by: PODIATRIST

## 2023-02-15 PROCEDURE — 1159F PR MEDICATION LIST DOCUMENTED IN MEDICAL RECORD: ICD-10-PCS | Mod: CPTII,S$GLB,, | Performed by: PODIATRIST

## 2023-02-15 PROCEDURE — 3008F PR BODY MASS INDEX (BMI) DOCUMENTED: ICD-10-PCS | Mod: CPTII,S$GLB,, | Performed by: PODIATRIST

## 2023-02-15 RX ORDER — METHYLPREDNISOLONE 4 MG/1
TABLET ORAL
Qty: 1 EACH | Refills: 0 | Status: SHIPPED | OUTPATIENT
Start: 2023-02-15 | End: 2023-04-05 | Stop reason: SDUPTHER

## 2023-02-15 RX ORDER — HYDROCODONE BITARTRATE AND ACETAMINOPHEN 5; 325 MG/1; MG/1
1 TABLET ORAL EVERY 6 HOURS PRN
Qty: 20 TABLET | Refills: 0 | Status: SHIPPED | OUTPATIENT
Start: 2023-02-15 | End: 2023-04-05 | Stop reason: SDUPTHER

## 2023-02-15 NOTE — PROGRESS NOTES
"  [address] [facility] [address]  [place] [address]  Phone: [phone]   [phone]    Patient's PCP:Primary Doctor No  Referring Provider: No ref. provider found    Subjective:      Chief Complaint:: Foot Pain (Right 1st toe plantar and middle of foot plantar)    SHANDRA Richard is a 57 y.o. male who presents today with a complaint of possible gout, pain in the plantar 1st toe radiating down the middle of his foot lasting since this morning. Onset of symptoms unknown and reports no trauma.  Current symptoms include painful.  Aggravating factors are pressure. Symptoms have not improved. Treatment to date have included none.  Patient states he has been completely out of his gout medication including his allopurinol for several months.    Vitals:    02/15/23 1548   Weight: 79.4 kg (175 lb)   Height: 5' 10" (1.778 m)   PainSc: 10-Worst pain ever      Shoe Size:     Past Surgical History:   Procedure Laterality Date    CORONARY ANGIOGRAPHY INCLUDING BYPASS GRAFTS WITH CATHETERIZATION OF LEFT HEART N/A 9/8/2022    Procedure: ANGIOGRAM, CORONARY, INCLUDING BYPASS GRAFT, WITH LEFT HEART CATHETERIZATION;  Surgeon: Andrews Lopez MD;  Location: [facility] CATH/EP LAB;  Service: Cardiology;  Laterality: N/A;    CORONARY ARTERY BYPASS GRAFT       Past Medical History:   Diagnosis Date    Coronary artery disease     COVID-19     Gout     Hyperlipidemia     Hypertension      Family History   Problem Relation Age of Onset    Heart disease Father         Social History:   Marital Status: Single  Alcohol History:  reports that he does not currently use alcohol.  Tobacco History:  reports that he has quit smoking. He has never used smokeless tobacco.  Drug History:  reports no history of drug use.    Review of patient's allergies indicates:  No Known Allergies    Current Outpatient Medications   Medication Sig Dispense Refill    aspirin 81 MG Chew Take 1 tablet (81 mg total) by mouth once daily. 90 tablet 0    atorvastatin " (LIPITOR) 80 MG tablet Take 1 tablet (80 mg total) by mouth every evening. 90 tablet 0    losartan (COZAAR) 25 MG tablet Take 1 tablet (25 mg total) by mouth once daily. 90 tablet 0    metoprolol succinate (TOPROL-XL) 100 MG 24 hr tablet Take 1 tablet (100 mg total) by mouth once daily. 90 tablet 0    nitroGLYCERIN (NITROSTAT) 0.4 MG SL tablet Place 1 tablet (0.4 mg total) under the tongue every 5 (five) minutes as needed for Chest pain. 15 tablet 0    HYDROcodone-acetaminophen (NORCO) 5-325 mg per tablet Take 1 tablet by mouth every 6 (six) hours as needed for Pain. 20 tablet 0    methylPREDNISolone (MEDROL DOSEPACK) 4 mg tablet use as directed 1 each 0     Current Facility-Administered Medications   Medication Dose Route Frequency Provider Last Rate Last Admin    acetaminophen tablet 650 mg  650 mg Oral Once PRN Cristina Hammond MD           Review of Systems   Constitutional:  Negative for chills, fatigue, fever and unexpected weight change.   HENT:  Negative for hearing loss and trouble swallowing.    Eyes:  Negative for photophobia and visual disturbance.   Respiratory:  Negative for cough, shortness of breath and wheezing.    Cardiovascular:  Negative for chest pain, palpitations and leg swelling.   Gastrointestinal:  Negative for abdominal pain and nausea.   Genitourinary:  Negative for dysuria and frequency.   Musculoskeletal:  Positive for arthralgias, back pain, gait problem and joint swelling.   Skin:  Negative for rash and wound.   Neurological:  Negative for tremors, seizures, weakness, numbness and headaches.   Hematological:  Bruises/bleeds easily.       Objective:        Physical Exam:   Foot Exam    General  Orientation: alert and oriented to person, place, and time   Affect: appropriate   Gait: unimpaired       Right Foot/Ankle     Inspection and Palpation  Ecchymosis: none  Tenderness: great toe interphalangeal joint   Swelling: great toe interphalangeal joint and ankle   Arch: normal  Hallux  valgus: yes  Skin Exam: skin intact; no drainage, no ulcer and no erythema   Neurovascular  Dorsalis pedis: 2+  Posterior tibial: 2+  Capillary Refill: 2+  Varicose veins: not present  Saphenous nerve sensation: normal  Tibial nerve sensation: normal  Superficial peroneal nerve sensation: normal  Deep peroneal nerve sensation: normal  Sural nerve sensation: normal    Edema  Type of edema: non-pitting    Muscle Strength  Ankle dorsiflexion: 5  Ankle plantar flexion: 5  Ankle inversion: 5  Ankle eversion: 5  Great toe extension: 5  Great toe flexion: 5    Range of Motion    Normal right ankle ROM  Passive  1st IP extension: pain  1st IP flexion: pain    Active  1st IP extension: pain  1st IP Flexion: pain    Tests  Anterior drawer: negative   Talar tilt: negative   PT Tinel's sign: negative    Paresthesia: negative      Physical Exam  Cardiovascular:      Pulses:           Dorsalis pedis pulses are 2+ on the right side.        Posterior tibial pulses are 2+ on the right side.   Musculoskeletal:      Right ankle: Swelling present.      Right foot: Bunion present.   Feet:      Right foot:      Skin integrity: No ulcer or erythema.             Right Ankle/Foot Exam     Swelling   The patient is swollen on the great toe interphalangeal joint.    Tenderness   The patient is tender to palpation of the great toe interphalangeal joint.    Range of Motion   The patient has normal right ankle ROM.        Muscle Strength   Right Lower Extremity   Ankle Dorsiflexion:  5   Plantar flexion:  5/5    Vascular Exam     Right Pulses  Dorsalis Pedis:      2+  Posterior Tibial:      2+         Imaging: none            Assessment:       1. Acute idiopathic gout involving toe of right foot    2. Right foot pain    3. Chronic bilateral low back pain without sciatica      Plan:   Acute idiopathic gout involving toe of right foot    Right foot pain  -     methylPREDNISolone (MEDROL DOSEPACK) 4 mg tablet; use as directed  Dispense: 1 each;  Refill: 0  -     HYDROcodone-acetaminophen (NORCO) 5-325 mg per tablet; Take 1 tablet by mouth every 6 (six) hours as needed for Pain.  Dispense: 20 tablet; Refill: 0    Chronic bilateral low back pain without sciatica  -     Ambulatory referral/consult to Orthopedics; Future; Expected date: 02/22/2023      Follow up if symptoms worsen or fail to improve.    Procedures        I discussed with the patient that I suspect he is having another gout attack.  He has been off of his allopurinol for several months.  We also have not had an updated uric acid level in several months.  I am going to send in a Medrol Dosepak for him and instructed him to go get an updated uric acid level from the order I previously sent in.    Counseling:     I provided patient education verbally regarding:   Patient diagnosis, treatment options, as well as alternatives, risks, and benefits.     The nature of gout is fully explained, including dietary relationship, acute and interval phase and treatment of both. Long term complications such as kidney stones, tophi and arthritis are discussed. Avoidance of alcohol recommended, and written literature is given along with a low purine diet. Indications for the use of allopurinol for prophylaxis and the use of colchicine to prevent or treat flare-ups is also discussed. Proper use of indomethacin for acute attacks discussed, and its side effects. Call if further attacks occur, or this one does not resolve promptly.      This note was created using Dragon voice recognition software that occasionally misinterpreted phrases or words.

## 2023-03-01 RX ORDER — METOPROLOL SUCCINATE 100 MG/1
100 TABLET, EXTENDED RELEASE ORAL DAILY
Qty: 90 TABLET | Refills: 3 | Status: SHIPPED | OUTPATIENT
Start: 2023-03-01 | End: 2023-05-26 | Stop reason: SDUPTHER

## 2023-03-01 RX ORDER — ATORVASTATIN CALCIUM 80 MG/1
80 TABLET, FILM COATED ORAL NIGHTLY
Qty: 90 TABLET | Refills: 0 | Status: SHIPPED | OUTPATIENT
Start: 2023-03-01 | End: 2023-05-26 | Stop reason: SDUPTHER

## 2023-03-04 ENCOUNTER — OCCUPATIONAL HEALTH (OUTPATIENT)
Dept: URGENT CARE | Facility: CLINIC | Age: 58
End: 2023-03-04
Payer: COMMERCIAL

## 2023-03-04 PROCEDURE — 80305 PR DRUG SCREEN - 1: ICD-10-PCS | Mod: S$GLB,,, | Performed by: NURSE PRACTITIONER

## 2023-03-04 PROCEDURE — 80305 DRUG TEST PRSMV DIR OPT OBS: CPT | Mod: S$GLB,,, | Performed by: NURSE PRACTITIONER

## 2023-03-23 ENCOUNTER — HOSPITAL ENCOUNTER (EMERGENCY)
Facility: HOSPITAL | Age: 58
Discharge: HOME | End: 2023-03-23
Attending: EMERGENCY MEDICINE
Payer: COMMERCIAL

## 2023-03-23 VITALS
WEIGHT: 175.06 LBS | OXYGEN SATURATION: 97 % | SYSTOLIC BLOOD PRESSURE: 132 MMHG | HEIGHT: 70 IN | TEMPERATURE: 98 F | HEART RATE: 57 BPM | RESPIRATION RATE: 18 BRPM | DIASTOLIC BLOOD PRESSURE: 88 MMHG | BODY MASS INDEX: 25.06 KG/M2

## 2023-03-23 DIAGNOSIS — R07.9 CHEST PAIN: Primary | ICD-10-CM

## 2023-03-23 LAB
ALBUMIN SERPL BCP-MCNC: 4.1 G/DL (ref 3.5–5.2)
ALP SERPL-CCNC: 80 U/L (ref 55–135)
ALT SERPL W/O P-5'-P-CCNC: 25 U/L (ref 10–44)
ANION GAP SERPL CALC-SCNC: 7 MMOL/L (ref 8–16)
AST SERPL-CCNC: 22 U/L (ref 10–40)
BASOPHILS # BLD AUTO: 0.03 K/UL (ref 0–0.2)
BASOPHILS NFR BLD: 0.5 % (ref 0–1.9)
BILIRUB SERPL-MCNC: 0.8 MG/DL (ref 0.1–1)
BNP SERPL-MCNC: 96 PG/ML (ref 0–99)
BUN SERPL-MCNC: 9 MG/DL (ref 6–20)
CALCIUM SERPL-MCNC: 9.5 MG/DL (ref 8.7–10.5)
CHLORIDE SERPL-SCNC: 109 MMOL/L (ref 95–110)
CO2 SERPL-SCNC: 25 MMOL/L (ref 23–29)
CREAT SERPL-MCNC: 1 MG/DL (ref 0.5–1.4)
DIFFERENTIAL METHOD: ABNORMAL
EOSINOPHIL # BLD AUTO: 0.2 K/UL (ref 0–0.5)
EOSINOPHIL NFR BLD: 3.5 % (ref 0–8)
ERYTHROCYTE [DISTWIDTH] IN BLOOD BY AUTOMATED COUNT: 11.8 % (ref 11.5–14.5)
EST. GFR  (NO RACE VARIABLE): >60 ML/MIN/1.73 M^2
GLUCOSE SERPL-MCNC: 93 MG/DL (ref 70–110)
HCT VFR BLD AUTO: 40 % (ref 40–54)
HGB BLD-MCNC: 13 G/DL (ref 14–18)
IMM GRANULOCYTES # BLD AUTO: 0.01 K/UL (ref 0–0.04)
IMM GRANULOCYTES NFR BLD AUTO: 0.2 % (ref 0–0.5)
LYMPHOCYTES # BLD AUTO: 2 K/UL (ref 1–4.8)
LYMPHOCYTES NFR BLD: 30.7 % (ref 18–48)
MCH RBC QN AUTO: 29.9 PG (ref 27–31)
MCHC RBC AUTO-ENTMCNC: 32.5 G/DL (ref 32–36)
MCV RBC AUTO: 92 FL (ref 82–98)
MONOCYTES # BLD AUTO: 0.5 K/UL (ref 0.3–1)
MONOCYTES NFR BLD: 8.3 % (ref 4–15)
NEUTROPHILS # BLD AUTO: 3.6 K/UL (ref 1.8–7.7)
NEUTROPHILS NFR BLD: 56.8 % (ref 38–73)
NRBC BLD-RTO: 0 /100 WBC
PLATELET # BLD AUTO: 166 K/UL (ref 150–450)
PMV BLD AUTO: 10.3 FL (ref 9.2–12.9)
POTASSIUM SERPL-SCNC: 4 MMOL/L (ref 3.5–5.1)
PROT SERPL-MCNC: 7.1 G/DL (ref 6–8.4)
RBC # BLD AUTO: 4.35 M/UL (ref 4.6–6.2)
SODIUM SERPL-SCNC: 141 MMOL/L (ref 136–145)
TROPONIN I SERPL DL<=0.01 NG/ML-MCNC: 0.01 NG/ML (ref 0–0.03)
TROPONIN I SERPL DL<=0.01 NG/ML-MCNC: <0.006 NG/ML (ref 0–0.03)
WBC # BLD AUTO: 6.36 K/UL (ref 3.9–12.7)

## 2023-03-23 PROCEDURE — 80053 COMPREHEN METABOLIC PANEL: CPT | Performed by: PHYSICIAN ASSISTANT

## 2023-03-23 PROCEDURE — 84484 ASSAY OF TROPONIN QUANT: CPT | Mod: 91 | Performed by: PHYSICIAN ASSISTANT

## 2023-03-23 PROCEDURE — 63600175 PHARM REV CODE 636 W HCPCS: Performed by: EMERGENCY MEDICINE

## 2023-03-23 PROCEDURE — 25000003 PHARM REV CODE 250: Performed by: PHYSICIAN ASSISTANT

## 2023-03-23 PROCEDURE — 93010 ELECTROCARDIOGRAM REPORT: CPT | Mod: ,,, | Performed by: SPECIALIST

## 2023-03-23 PROCEDURE — 93010 EKG 12-LEAD: ICD-10-PCS | Mod: ,,, | Performed by: SPECIALIST

## 2023-03-23 PROCEDURE — 25000242 PHARM REV CODE 250 ALT 637 W/ HCPCS: Performed by: EMERGENCY MEDICINE

## 2023-03-23 PROCEDURE — 36415 COLL VENOUS BLD VENIPUNCTURE: CPT | Performed by: PHYSICIAN ASSISTANT

## 2023-03-23 PROCEDURE — 83880 ASSAY OF NATRIURETIC PEPTIDE: CPT | Performed by: PHYSICIAN ASSISTANT

## 2023-03-23 PROCEDURE — 94760 N-INVAS EAR/PLS OXIMETRY 1: CPT

## 2023-03-23 PROCEDURE — 94761 N-INVAS EAR/PLS OXIMETRY MLT: CPT

## 2023-03-23 PROCEDURE — 93005 ELECTROCARDIOGRAM TRACING: CPT

## 2023-03-23 PROCEDURE — 85025 COMPLETE CBC W/AUTO DIFF WBC: CPT | Performed by: PHYSICIAN ASSISTANT

## 2023-03-23 PROCEDURE — 99285 EMERGENCY DEPT VISIT HI MDM: CPT | Mod: 25

## 2023-03-23 PROCEDURE — 25000003 PHARM REV CODE 250: Performed by: EMERGENCY MEDICINE

## 2023-03-23 PROCEDURE — 96374 THER/PROPH/DIAG INJ IV PUSH: CPT

## 2023-03-23 RX ORDER — ASPIRIN 325 MG
325 TABLET ORAL
Status: COMPLETED | OUTPATIENT
Start: 2023-03-23 | End: 2023-03-23

## 2023-03-23 RX ORDER — MORPHINE SULFATE 2 MG/ML
6 INJECTION, SOLUTION INTRAMUSCULAR; INTRAVENOUS
Status: COMPLETED | OUTPATIENT
Start: 2023-03-23 | End: 2023-03-23

## 2023-03-23 RX ORDER — ACETAMINOPHEN 325 MG/1
650 TABLET ORAL
Status: COMPLETED | OUTPATIENT
Start: 2023-03-23 | End: 2023-03-23

## 2023-03-23 RX ORDER — NITROGLYCERIN 0.4 MG/1
0.4 TABLET SUBLINGUAL EVERY 5 MIN PRN
Status: COMPLETED | OUTPATIENT
Start: 2023-03-23 | End: 2023-03-23

## 2023-03-23 RX ADMIN — ASPIRIN 325 MG: 325 TABLET ORAL at 10:03

## 2023-03-23 RX ADMIN — NITROGLYCERIN 0.4 MG: 0.4 TABLET, ORALLY DISINTEGRATING SUBLINGUAL at 12:03

## 2023-03-23 RX ADMIN — ACETAMINOPHEN 650 MG: 325 TABLET ORAL at 07:03

## 2023-03-23 RX ADMIN — NITROGLYCERIN 0.4 MG: 0.4 TABLET, ORALLY DISINTEGRATING SUBLINGUAL at 10:03

## 2023-03-23 RX ADMIN — MORPHINE SULFATE 6 MG: 2 INJECTION, SOLUTION INTRAMUSCULAR; INTRAVENOUS at 01:03

## 2023-03-23 NOTE — NURSING
Patient sitting in bed, lab at bedside.  Possible tx to [facility]-NO.

## 2023-03-23 NOTE — ED NOTES
Pt updated on status of pending tx @ this time. As per [facility] transfer center, still awaiting required bed. Will continue to monitor

## 2023-03-23 NOTE — LETTER
Patient: Irwin Richard  YOB: 1965  Date: 3/23/2023 Time: 9:13 PM  Location: [facility]    Leaving the Hospital Against Medical Advice    Chart #:[id number]    This will certify that I, the undersigned,    ______________________________________________________________________    A patient in the above named medical center, having requested discharge and removal from the medical center against the advice of my attending physician(s), hereby release [facility], its physicians, officers and employees, severally and individually, from any and all liability of any nature whatsoever for any injury or harm or complication of any kind that may result directly or indirectly, by reason of my terminating my stay as a patient at [facility] and my departure from [facility], and hereby waive any and all rights of action I may now have or later acquire as a result of my voluntary departure from [facility] and the termination of my stay as a patient therein.    This release is made with the full knowledge of the danger that may result from the action which I am taking.      Date:_______________________                         ___________________________                                                                                    Patient/Legal Representative    Witness:        ____________________________                          ___________________________  Nurse                                                                        Physician

## 2023-03-23 NOTE — ED PROVIDER NOTES
Encounter Date: 3/23/2023    SCRIBE #1 NOTE: I, Jone Dumont, am scribing for, and in the presence of,  Nicola Porter MD.     History     Chief Complaint   Patient presents with    Chest Pain     On and off since bypass surgery 5 years ago -   This episode since last night - did not take nitro.  Took 81mg Aspirin this morning.      Time seen by provider: 10:16 AM on 03/23/2023    Irwin Richard is a 57 y.o. male who presents to the ED with an onset of persistent left-sided pressure-like chest pain that radiates to his back that began 2 days ago, as well as a stabbing pain around his left ribs. The patient states he has been experiencing the pain intermittently over the last couple of months since January. He has not seen a cardiologist recently, but he has seen Dr. Lula Lopez in the past. He describes his current pain as an 8/10. He had a CABG performed in 2017 that included 4 vessels, and has cardiac stents placed. The patient takes mitoprolol, atorvastatin, aspirin, and one other medication. He states he does not know if the last medication he can't remember is a blood thinner. The patient denies sweating, SOB, nausea, or any other symptoms at this time. PMHx of HTN, HLD, and CAD. PSHx of CABG, cardiac stent placement, and coronary angiography including bypass grafts with catheterization of left heart.    The history is provided by the patient.   Review of patient's allergies indicates:  No Known Allergies  Past Medical History:   Diagnosis Date    Coronary artery disease     COVID-19     Gout     Hyperlipidemia     Hypertension      Past Surgical History:   Procedure Laterality Date    CORONARY ANGIOGRAPHY INCLUDING BYPASS GRAFTS WITH CATHETERIZATION OF LEFT HEART N/A 9/8/2022    Procedure: ANGIOGRAM, CORONARY, INCLUDING BYPASS GRAFT, WITH LEFT HEART CATHETERIZATION;  Surgeon: Andrews Lopez MD;  Location: [facility] CATH/EP LAB;  Service: Cardiology;  Laterality: N/A;    CORONARY ARTERY BYPASS GRAFT        Family History   Problem Relation Age of Onset    Heart disease Father      Social History     Tobacco Use    Smoking status: Former    Smokeless tobacco: Never   Substance Use Topics    Alcohol use: Not Currently    Drug use: Never     Review of Systems   Constitutional:  Negative for diaphoresis and fever.   HENT:  Negative for sore throat.    Respiratory:  Negative for shortness of breath.    Cardiovascular:  Positive for chest pain.   Gastrointestinal:  Negative for nausea.   Genitourinary:  Negative for dysuria.   Musculoskeletal:  Negative for back pain.   Skin:  Negative for rash.   Neurological:  Negative for weakness.     Physical Exam     Initial Vitals [03/23/23 0933]   BP Pulse Resp Temp SpO2   122/78 62 18 97.6 °F (36.4 °C) 99 %      MAP       --         Physical Exam    Nursing note and vitals reviewed.  Constitutional: Vital signs are normal. He appears well-developed and well-nourished.  Non-toxic appearance. No distress.   HENT:   Head: Normocephalic and atraumatic.   Eyes: EOM are normal. Pupils are equal, round, and reactive to light.   Neck: Neck supple. No JVD present.   Normal range of motion.  Cardiovascular:  Normal rate, regular rhythm, normal heart sounds and intact distal pulses.     Exam reveals no gallop and no friction rub.       No murmur heard.  Pulmonary/Chest: Breath sounds normal. He has no wheezes. He has no rhonchi. He has no rales.   Abdominal: Abdomen is soft. Bowel sounds are normal. There is no abdominal tenderness. There is no rebound and no guarding.   Musculoskeletal:         General: Normal range of motion.      Cervical back: Normal range of motion and neck supple. No rigidity.     Neurological: He is alert and oriented to person, place, and time. He has normal strength and normal reflexes. No cranial nerve deficit or sensory deficit. He exhibits normal muscle tone. Coordination normal. GCS eye subscore is 4. GCS verbal subscore is 5. GCS motor subscore is 6.   Skin:  Skin is warm and dry.   Psychiatric: He has a normal mood and affect. His speech is normal and behavior is normal. He is not actively hallucinating.       ED Course   Procedures  Labs Reviewed   CBC W/ AUTO DIFFERENTIAL - Abnormal; Notable for the following components:       Result Value    RBC 4.35 (*)     Hemoglobin 13.0 (*)     All other components within normal limits   COMPREHENSIVE METABOLIC PANEL - Abnormal; Notable for the following components:    Anion Gap 7 (*)     All other components within normal limits   TROPONIN I   B-TYPE NATRIURETIC PEPTIDE   TROPONIN I     EKG Readings: (Independently Interpreted)   Sinus bradycardia at rate of 59 bpm with normal axis and normal intervals. Nonspecific T wave changes in Leads I and II. Normal ST segments. No STEMI.    ECG Results              EKG 12-lead (Chest Pain) Age >30 (In process)  Result time 03/23/23 09:34:57      In process by Interface, Lab In [facility] (03/23/23 09:34:57)                   Narrative:    Test Reason : R07.9,    Vent. Rate : 059 BPM     Atrial Rate : 059 BPM     P-R Int : 176 ms          QRS Dur : 080 ms      QT Int : 386 ms       P-R-T Axes : 068 005 095 degrees     QTc Int : 382 ms    Sinus bradycardia  Abnormal QRS-T angle, consider primary T wave abnormality  Abnormal ECG  When compared with ECG of 18-JAN-2023 16:55,  No significant change was found    Referred By:  JONATHON CHRISTENSEN           Confirmed By:                                   Imaging Results              X-Ray Chest AP Portable (Final result)  Result time 03/23/23 10:27:44      Final result by Emre Laguna Jr., MD (03/23/23 10:27:44)                   Impression:      Prior CABG otherwise negative portable chest x-ray      Electronically signed by: Emre Laguna MD  Date:    03/23/2023  Time:    10:27               Narrative:    EXAMINATION:  XR CHEST AP PORTABLE    CLINICAL HISTORY:  Chest Pain;    TECHNIQUE:  Single frontal view of the chest was  performed.    COMPARISON:  Chest of July 28, 2022    FINDINGS:  The patient has had a prior CABG with sternal wires and marker rings in place.  The cardiac size is within normal limits.  No intrapulmonary masses or infiltrates are seen.  No pneumothorax or pleural effusion is noted.                                       Medications   aspirin tablet 325 mg (325 mg Oral Given 3/23/23 1011)   nitroGLYCERIN SL tablet 0.4 mg (0.4 mg Sublingual Given 3/23/23 1227)   morphine injection 6 mg (6 mg Intravenous Given 3/23/23 1356)     Medical Decision Making:   History:   Old Medical Records: I decided to obtain old medical records.  Initial Assessment:   57-year-old man with a history CABG x4 presents emergency department with chest pain.  States he has been having chest pain intermittently for several months but over the past day has become more constant.  States normally gets better with rest but not currently.  Current chest pain 8/10 in severity and radiating to the left back.  EKG shows nonspecific T-wave abnormalities.  Initial troponin is negative.  On review of previous medical records he was seen by Dr. Lopez and had an angiogram September of 2022 that showed severe multivessel disease.  He was referred to Cardiology at [facility] given complicated nature of PCI in this patient but was lost to follow-up.  Discussed with Hospital Medicine who discussed with Cardiology here and recommends transfer to [facility].  Patient needs admission for further evaluation to rule out unstable angina.  He is given nitroglycerin without improvement but is given morphine with improved pain.  I discussed with Cardiology at [facility] who agrees with transfer and accepts patient for transfer for continued evaluation there.  Independently Interpreted Test(s):   I have ordered and independently interpreted EKG Reading(s) - see prior notes  Clinical Tests:   Lab Tests: Ordered and Reviewed  Radiological Study: Ordered and  Reviewed  Medical Tests: Ordered and Reviewed  Additional MDM:   Heart Score:    History:          Moderately suspicious.  ECG:             Nonspecific repolarisation disturbance  Age:               45-65 years  Risk factors: >= 3 risk factors or history of atherosclerotic disease  Troponin:       Less than or equal to normal limit  Final Score: 5       Scribe Attestation:   Scribe #1: I performed the above scribed service and the documentation accurately describes the services I performed. I attest to the accuracy of the note.      ED Course as of 03/23/23 1420   Thu Mar 23, 2023   1213 Current pain is 8 out 10 in severity.  Patient given 1 sublingual nitroglycerin which did not relieve his pain but did give him a headache.  Refused further nitroglycerin from nurse.  I explained to him the reason for giving him this medication the side effects.  He agrees to take the remaining nitroglycerins. [AS]   1214 Discussed with Hospital Medicine who spoke with cardiology.  Patient has a cardiac cath in September of 2022 that showed severe multivessel disease.  Dr. Lopez refer the patient to Dr. Claudio Ko for complicated PCI but patient did not show up to appointment and was lost to follow-up. [AS]      ED Course User Index  [AS] Nicola Khan MD          I, Bill Cline, personally performed the services described in this documentation. All medical record entries made by the scribe were at my direction and in my presence.  I have reviewed the chart and agree that the record reflects my personal performance and is accurate and complete. Nicola Khan MD.       Clinical Impression:   Final diagnoses:  [R07.9] Chest pain (Primary)        ED Disposition Condition    Transfer to Another Facility Stable                Nicola Khan MD  03/23/23 1420

## 2023-03-23 NOTE — FIRST PROVIDER EVALUATION
Emergency Department TeleTriage Encounter Note      CHIEF COMPLAINT    Chief Complaint   Patient presents with    Chest Pain     On and off since bypass surgery 5 years ago -   This episode since last night - did not take nitro.  Took 81mg Aspirin this morning.        VITAL SIGNS   Initial Vitals [03/23/23 0933]   BP Pulse Resp Temp SpO2   122/78 62 18 97.6 °F (36.4 °C) 99 %      MAP       --            ALLERGIES    Review of patient's allergies indicates:  No Known Allergies    PROVIDER TRIAGE NOTE  Patient presents with intermittent midsternal chest pain and left chest pain worse with inspiration since yesterday. No recent cough or trauma.       ORDERS  Labs Reviewed - No data to display    ED Orders (720h ago, onward)      Start Ordered     Status Ordering Provider    03/23/23 0929 03/23/23 0928  EKG 12-lead (Chest Pain) Age >30  Once        Comments: If patient > 30 yrs.    In process LEVAR BRITO              Virtual Visit Note: The provider triage portion of this emergency department evaluation and documentation was performed via [employer], a HIPAA-compliant telemedicine application, in concert with a tele-presenter in the room. A face to face patient evaluation with one of my colleagues will occur once the patient is placed in an emergency department room.      DISCLAIMER: This note was prepared with [employer] voice recognition transcription software. Garbled syntax, mangled pronouns, and other bizarre constructions may be attributed to that software system.

## 2023-03-24 NOTE — PROVIDER PROGRESS NOTES - EMERGENCY DEPT.
Encounter Date: 3/23/2023    ED Physician Progress Notes        Physician Note:   I Assumed care at 7:00 p.m., 57-year-old male with past medical history complicated CAD presenting with chest pain.  Troponins negative x2, pending transfer to [facility] for cardiology monitored bed.

## 2023-03-24 NOTE — ED NOTES
Disgruntled about the extended wait in regards to pending transfer. Updated on current status. C/o chest pain, refuses NTG, d/t headache.

## 2023-03-24 NOTE — ED NOTES
Stated he wasn't staying anymore, ER MD notified of patient Leaving Against Medical Advice, ER MD spoke extensively to patient earlier. AMA form signed.

## 2023-03-24 NOTE — PROVIDER PROGRESS NOTES - EMERGENCY DEPT.
Encounter Date: 3/23/2023    ED Physician Progress Notes        Physician Note:   Received notice from nurse that patient was requesting to AMA.  Had extensive discussion with patient regarding risks of leaving against medical advice, patient expressed that he was simply frustrated because he has not been able to eat since yesterday.  Patient was informed that eating could significantly delay any procedure that was done after his transfer should his troponin be elevated.  Discussed risks and benefits of patient's leaving against medical advice versus delay and possible catheterization, elected to allow patient to eat in order to keep him from leaving the hospital.  Patient informed that this potential delay in his care caused by eating could result in permanent disability, aspiration, death, patient understands these risks, will give patient a diet, pending 3rd troponin at 10:00 p.m. and transfer to [facility].

## 2023-03-25 ENCOUNTER — HOSPITAL ENCOUNTER (EMERGENCY)
Facility: HOSPITAL | Age: 58
Discharge: HOME | End: 2023-03-25
Attending: EMERGENCY MEDICINE
Payer: COMMERCIAL

## 2023-03-25 VITALS
DIASTOLIC BLOOD PRESSURE: 92 MMHG | HEART RATE: 60 BPM | BODY MASS INDEX: 25.05 KG/M2 | RESPIRATION RATE: 17 BRPM | SYSTOLIC BLOOD PRESSURE: 142 MMHG | HEIGHT: 70 IN | WEIGHT: 175 LBS | TEMPERATURE: 98 F | OXYGEN SATURATION: 97 %

## 2023-03-25 DIAGNOSIS — R07.89 CHEST DISCOMFORT: ICD-10-CM

## 2023-03-25 DIAGNOSIS — R07.9 CHEST PAIN: ICD-10-CM

## 2023-03-25 DIAGNOSIS — I25.10 CORONARY ARTERY DISEASE INVOLVING NATIVE CORONARY ARTERY OF NATIVE HEART WITHOUT ANGINA PECTORIS: Primary | ICD-10-CM

## 2023-03-25 LAB
ALBUMIN SERPL BCP-MCNC: 3.7 G/DL (ref 3.5–5.2)
ALP SERPL-CCNC: 85 U/L (ref 55–135)
ALT SERPL W/O P-5'-P-CCNC: 15 U/L (ref 10–44)
ANION GAP SERPL CALC-SCNC: 11 MMOL/L (ref 8–16)
AST SERPL-CCNC: 18 U/L (ref 10–40)
BASOPHILS # BLD AUTO: 0.03 K/UL (ref 0–0.2)
BASOPHILS NFR BLD: 0.5 % (ref 0–1.9)
BILIRUB SERPL-MCNC: 0.5 MG/DL (ref 0.1–1)
BNP SERPL-MCNC: 164 PG/ML (ref 0–99)
BUN SERPL-MCNC: 11 MG/DL (ref 6–20)
BUN SERPL-MCNC: 11 MG/DL (ref 6–30)
CALCIUM SERPL-MCNC: 9.2 MG/DL (ref 8.7–10.5)
CHLORIDE SERPL-SCNC: 108 MMOL/L (ref 95–110)
CHLORIDE SERPL-SCNC: 111 MMOL/L (ref 95–110)
CO2 SERPL-SCNC: 22 MMOL/L (ref 23–29)
CREAT SERPL-MCNC: 1 MG/DL (ref 0.5–1.4)
CREAT SERPL-MCNC: 1 MG/DL (ref 0.5–1.4)
DIFFERENTIAL METHOD: ABNORMAL
EOSINOPHIL # BLD AUTO: 0.3 K/UL (ref 0–0.5)
EOSINOPHIL NFR BLD: 4.3 % (ref 0–8)
ERYTHROCYTE [DISTWIDTH] IN BLOOD BY AUTOMATED COUNT: 11.8 % (ref 11.5–14.5)
EST. GFR  (NO RACE VARIABLE): >60 ML/MIN/1.73 M^2
GLUCOSE SERPL-MCNC: 97 MG/DL (ref 70–110)
GLUCOSE SERPL-MCNC: 98 MG/DL (ref 70–110)
HCT VFR BLD AUTO: 36.2 % (ref 40–54)
HCT VFR BLD CALC: 35 %PCV (ref 36–54)
HCV AB SERPL QL IA: NORMAL
HGB BLD-MCNC: 12 G/DL (ref 14–18)
HIV 1+2 AB+HIV1 P24 AG SERPL QL IA: NORMAL
IMM GRANULOCYTES # BLD AUTO: 0.01 K/UL (ref 0–0.04)
IMM GRANULOCYTES NFR BLD AUTO: 0.2 % (ref 0–0.5)
LYMPHOCYTES # BLD AUTO: 2.4 K/UL (ref 1–4.8)
LYMPHOCYTES NFR BLD: 37.8 % (ref 18–48)
MCH RBC QN AUTO: 30.2 PG (ref 27–31)
MCHC RBC AUTO-ENTMCNC: 33.1 G/DL (ref 32–36)
MCV RBC AUTO: 91 FL (ref 82–98)
MONOCYTES # BLD AUTO: 0.6 K/UL (ref 0.3–1)
MONOCYTES NFR BLD: 9.3 % (ref 4–15)
NEUTROPHILS # BLD AUTO: 3 K/UL (ref 1.8–7.7)
NEUTROPHILS NFR BLD: 47.9 % (ref 38–73)
NRBC BLD-RTO: 0 /100 WBC
PLATELET # BLD AUTO: 159 K/UL (ref 150–450)
PMV BLD AUTO: 10 FL (ref 9.2–12.9)
POC CARDIAC TROPONIN I: 0 NG/ML (ref 0–0.08)
POC IONIZED CALCIUM: 1.25 MMOL/L (ref 1.06–1.42)
POC TCO2 (MEASURED): 26 MMOL/L (ref 23–29)
POTASSIUM BLD-SCNC: 3.9 MMOL/L (ref 3.5–5.1)
POTASSIUM SERPL-SCNC: 4 MMOL/L (ref 3.5–5.1)
PROT SERPL-MCNC: 6.7 G/DL (ref 6–8.4)
RBC # BLD AUTO: 3.97 M/UL (ref 4.6–6.2)
SAMPLE: ABNORMAL
SAMPLE: NORMAL
SODIUM BLD-SCNC: 144 MMOL/L (ref 136–145)
SODIUM SERPL-SCNC: 144 MMOL/L (ref 136–145)
TROPONIN I SERPL DL<=0.01 NG/ML-MCNC: 0.01 NG/ML (ref 0–0.03)
TROPONIN I SERPL DL<=0.01 NG/ML-MCNC: 0.02 NG/ML (ref 0–0.03)
TROPONIN I SERPL DL<=0.01 NG/ML-MCNC: 0.02 NG/ML (ref 0–0.03)
WBC # BLD AUTO: 6.24 K/UL (ref 3.9–12.7)

## 2023-03-25 PROCEDURE — 99285 EMERGENCY DEPT VISIT HI MDM: CPT | Mod: 25

## 2023-03-25 PROCEDURE — 93010 ELECTROCARDIOGRAM REPORT: CPT | Mod: ,,, | Performed by: INTERNAL MEDICINE

## 2023-03-25 PROCEDURE — 86803 HEPATITIS C AB TEST: CPT | Performed by: PHYSICIAN ASSISTANT

## 2023-03-25 PROCEDURE — 99284 PR EMERGENCY DEPT VISIT,LEVEL IV: ICD-10-PCS | Mod: ,,, | Performed by: EMERGENCY MEDICINE

## 2023-03-25 PROCEDURE — 93010 ELECTROCARDIOGRAM REPORT: CPT | Mod: 76,,, | Performed by: INTERNAL MEDICINE

## 2023-03-25 PROCEDURE — 87389 HIV-1 AG W/HIV-1&-2 AB AG IA: CPT | Performed by: PHYSICIAN ASSISTANT

## 2023-03-25 PROCEDURE — 25000003 PHARM REV CODE 250: Performed by: EMERGENCY MEDICINE

## 2023-03-25 PROCEDURE — 85025 COMPLETE CBC W/AUTO DIFF WBC: CPT | Performed by: EMERGENCY MEDICINE

## 2023-03-25 PROCEDURE — 93005 ELECTROCARDIOGRAM TRACING: CPT

## 2023-03-25 PROCEDURE — 80053 COMPREHEN METABOLIC PANEL: CPT | Performed by: EMERGENCY MEDICINE

## 2023-03-25 PROCEDURE — 83880 ASSAY OF NATRIURETIC PEPTIDE: CPT | Performed by: EMERGENCY MEDICINE

## 2023-03-25 PROCEDURE — 99284 EMERGENCY DEPT VISIT MOD MDM: CPT | Mod: ,,, | Performed by: EMERGENCY MEDICINE

## 2023-03-25 PROCEDURE — 93010 EKG 12-LEAD: ICD-10-PCS | Mod: ,,, | Performed by: INTERNAL MEDICINE

## 2023-03-25 PROCEDURE — 84484 ASSAY OF TROPONIN QUANT: CPT | Mod: 91 | Performed by: EMERGENCY MEDICINE

## 2023-03-25 RX ORDER — ACETAMINOPHEN 500 MG
1000 TABLET ORAL
Status: DISCONTINUED | OUTPATIENT
Start: 2023-03-25 | End: 2023-03-26 | Stop reason: HOSPADM

## 2023-03-25 RX ORDER — ASPIRIN 325 MG
325 TABLET ORAL
Status: COMPLETED | OUTPATIENT
Start: 2023-03-25 | End: 2023-03-25

## 2023-03-25 RX ORDER — NITROGLYCERIN 0.4 MG/1
0.4 TABLET SUBLINGUAL EVERY 5 MIN PRN
Status: DISCONTINUED | OUTPATIENT
Start: 2023-03-25 | End: 2023-03-26 | Stop reason: HOSPADM

## 2023-03-25 RX ADMIN — ASPIRIN 325 MG ORAL TABLET 325 MG: 325 PILL ORAL at 05:03

## 2023-03-25 NOTE — ED TRIAGE NOTES
Chest pain times two days, open heart surgery in 2017 quadruple bypass and stent placement in 2021, transferred from [facility].

## 2023-03-26 NOTE — HPI
"58 yo male presents with chest pain. Cardiology consulted for evaluation.     Patient Active Problem List   Diagnosis    History of coronary artery bypass graft    Gout of foot    Chronic bilateral low back pain without sciatica    Essential hypertension    HLD (hyperlipidemia)    CAD (coronary artery disease)    New onset of congestive heart failure    Abnormal stress test     S/p CABG 2017 ([place]). Followed  by Dr. Lopez. Coronary angiogram 9/22 with patent grafts, and native CAD. He did not undergo intervention at that time. He was being referred to Dr. Claudio Ko for possible PCI, but has not been seen yet. He was in the ED 2 days prior with chest pain. He was awaiting transfer to [facility] (CCU service), but left the ED prior to transfer. In regards to his chest discomfort, he reports it is left sided and feels like "my heart is enlarging". He also reports sharp substernal discomfort as well at times. The discomfort described is the same as discomfort he was having in Sep '22 when he underwent his angiogram. Also reports that it is different than the discomfort he had in 2017 when he had an MI with CABG. Denies any exertional component, and actually states that walking around sometimes improves his symptoms. Discomfort can last for minutes to hours. Not actively having chest pain. Took NTG this morning, but states it does help his discomfort and only gives him a HA. EF most recently 50%.    In the ED, his is HD stable and sating well on RA. Trop negative x 1. His tp was negative in the ED 2 days prior as well. ECG without ischemic changes. .  "

## 2023-03-26 NOTE — SUBJECTIVE & OBJECTIVE
Past Medical History:   Diagnosis Date    Coronary artery disease     COVID-19     Gout     Hyperlipidemia     Hypertension        Past Surgical History:   Procedure Laterality Date    CORONARY ANGIOGRAPHY INCLUDING BYPASS GRAFTS WITH CATHETERIZATION OF LEFT HEART N/A 9/8/2022    Procedure: ANGIOGRAM, CORONARY, INCLUDING BYPASS GRAFT, WITH LEFT HEART CATHETERIZATION;  Surgeon: Andrews Lopez MD;  Location: [facility] CATH/EP LAB;  Service: Cardiology;  Laterality: N/A;    CORONARY ARTERY BYPASS GRAFT         Review of patient's allergies indicates:  No Known Allergies    Current Facility-Administered Medications on File Prior to Encounter   Medication    acetaminophen tablet 650 mg     Current Outpatient Medications on File Prior to Encounter   Medication Sig    aspirin 81 MG Chew Take 1 tablet (81 mg total) by mouth once daily.    atorvastatin (LIPITOR) 80 MG tablet Take 1 tablet (80 mg total) by mouth every evening.    losartan (COZAAR) 25 MG tablet Take 1 tablet (25 mg total) by mouth once daily.    metoprolol succinate (TOPROL-XL) 100 MG 24 hr tablet Take 1 tablet (100 mg total) by mouth once daily.    nitroGLYCERIN (NITROSTAT) 0.4 MG SL tablet Place 1 tablet (0.4 mg total) under the tongue every 5 (five) minutes as needed for Chest pain.    HYDROcodone-acetaminophen (NORCO) 5-325 mg per tablet Take 1 tablet by mouth every 6 (six) hours as needed for Pain.    methylPREDNISolone (MEDROL DOSEPACK) 4 mg tablet use as directed     Family History       Problem Relation (Age of Onset)    Heart disease Father          Tobacco Use    Smoking status: Former    Smokeless tobacco: Never   Substance and Sexual Activity    Alcohol use: Not Currently    Drug use: Never    Sexual activity: Yes     Partners: Female     Birth control/protection: None     Review of Systems   All other systems reviewed and are negative.  Objective:     Vital Signs (Most Recent):  Temp: 98.2 °F (36.8 °C) (03/25/23 1910)  Pulse: 60 (03/25/23 1904)  Resp:  16 (03/25/23 1904)  BP: 130/81 (03/25/23 1903)  SpO2: 97 % (03/25/23 1904) Vital Signs (24h Range):  Temp:  [98.2 °F (36.8 °C)-98.5 °F (36.9 °C)] 98.2 °F (36.8 °C)  Pulse:  [60-68] 60  Resp:  [15-18] 16  SpO2:  [96 %-99 %] 97 %  BP: (130-143)/(73-83) 130/81       Weight: 79.4 kg (175 lb)  Body mass index is 25.11 kg/m².    SpO2: 97 %       Physical Exam  General: NAD. AAO  HENT: EOMI  Neck: supple. No JVD  CV: RRR. Normal S1/S2. No gallops, rubs, or murmurs. 2+ radial pulses  Resp: CTAB. No increased work of breathing  Ext: warm. No edema.      Significant Labs: All pertinent lab results from the last 24 hours have been reviewed.    Significant Imaging:  Reviewed

## 2023-03-26 NOTE — ASSESSMENT & PLAN NOTE
Chest pain intermittently for 6 months. S/p angiogram 9/22 with patent grafts, and native CAD. He was having the same chest pain prior to his angiogram, and different from his chest pain in 2017 prior to CABG. No chest pain at this time. The characteristics of his chest pain are atypical for angina. No objective evidence of ACS. Other life threatening causes of chest pain evaluated. I discussed that we would could admit him to observation over the weekend with interventional evaluation on Monday, or if he would prefer he could be discharged from the ED with outpatient evaluation.   - Follow up second troponin  - Follow up CXR  - NTG PRN  - If discharged, outpatient follow up with IC at [facility]

## 2023-03-26 NOTE — ED PROVIDER NOTES
Encounter Date: 3/25/2023       History     Chief Complaint   Patient presents with    Admission     Left [place] 2d ago before transfer here but was taking long, so I left ama ,     Chest Pain     57y M with PMH of HTN, CAD (s/p CABG) presents for evaluation of persistent chest pain. Patient was evaluated 2 days ago at outside hospital and was accepted as a transfer to cardiology for intervention. Patient felt transport was taking too long so he left AMA.   He comes back to the ED today with persistent left sided chest pain. Repots the pain is severe,intermittent, associated with SOB. No exacerbating or relieving symptoms. Not worsened by exertion. Not relieved by nitro.     The history is provided by the patient and medical records.   Review of patient's allergies indicates:  No Known Allergies  Past Medical History:   Diagnosis Date    Coronary artery disease     COVID-19     Gout     Hyperlipidemia     Hypertension      Past Surgical History:   Procedure Laterality Date    CORONARY ANGIOGRAPHY INCLUDING BYPASS GRAFTS WITH CATHETERIZATION OF LEFT HEART N/A 9/8/2022    Procedure: ANGIOGRAM, CORONARY, INCLUDING BYPASS GRAFT, WITH LEFT HEART CATHETERIZATION;  Surgeon: Andrews Lopez MD;  Location: [facility] CATH/EP LAB;  Service: Cardiology;  Laterality: N/A;    CORONARY ARTERY BYPASS GRAFT       Family History   Problem Relation Age of Onset    Heart disease Father      Social History     Tobacco Use    Smoking status: Former    Smokeless tobacco: Never   Substance Use Topics    Alcohol use: Not Currently    Drug use: Never         Physical Exam     Initial Vitals [03/25/23 1604]   BP Pulse Resp Temp SpO2   131/83 68 18 98.2 °F (36.8 °C) 99 %      MAP       --         Physical Exam    Nursing note and vitals reviewed.  Constitutional: He appears well-developed and well-nourished.   HENT:   Head: Normocephalic.   Eyes: Conjunctivae are normal.   Neck: Trachea normal.   Cardiovascular:  Normal rate, regular rhythm, intact  distal pulses and normal pulses.           Pulmonary/Chest: Breath sounds normal. No tachypnea. No respiratory distress.   Abdominal: Abdomen is soft. There is no abdominal tenderness.     Neurological: He is alert and oriented to person, place, and time.   Skin: Skin is warm.       ED Course   Procedures  Labs Reviewed   CBC W/ AUTO DIFFERENTIAL - Abnormal; Notable for the following components:       Result Value    RBC 3.97 (*)     Hemoglobin 12.0 (*)     Hematocrit 36.2 (*)     All other components within normal limits   COMPREHENSIVE METABOLIC PANEL - Abnormal; Notable for the following components:    Chloride 111 (*)     CO2 22 (*)     All other components within normal limits   B-TYPE NATRIURETIC PEPTIDE - Abnormal; Notable for the following components:     (*)     All other components within normal limits   ISTAT PROCEDURE - Abnormal; Notable for the following components:    POC Hematocrit 35 (*)     All other components within normal limits   HIV 1 / 2 ANTIBODY    Narrative:     Release to patient->Immediate   HEPATITIS C ANTIBODY    Narrative:     Release to patient->Immediate   TROPONIN I   TROPONIN I   TROPONIN ISTAT   TROPONIN I   POCT TROPONIN   POCT TROPONIN          Imaging Results              X-Ray Chest 1 View (Final result)  Result time 03/25/23 21:00:52      Final result by Kendrick Greene MD (03/25/23 21:00:52)                   Impression:      No acute findings.    No significant change from prior study.      Electronically signed by: Kendrick Greene MD  Date:    03/25/2023  Time:    21:00               Narrative:    EXAMINATION:  XR CHEST 1 VIEW    CLINICAL HISTORY:  chest pain;    TECHNIQUE:  Single frontal view of the chest was performed.    COMPARISON:  03/23/2023.    FINDINGS:  Postop changes including sutures at the left apex, clips over the mediastinum and midline sternotomy wires, similar to prior.    Heart and lungs  appear unchanged when allowing for differences in  technique and positioning.                                       Medications   nitroGLYCERIN SL tablet 0.4 mg (has no administration in time range)   acetaminophen tablet 1,000 mg (1,000 mg Oral Not Given 3/25/23 1830)   aspirin tablet 325 mg (325 mg Oral Given 3/25/23 1758)                 ED Course as of 03/25/23 2328   Sat Mar 25, 2023   1725 EKG,Per my independent interpretation.    sinus 62 no stemi [HS]   1820 Patient refusing nitro [HS]   1919 Discussed with Cardiology, they will evaluate the patient in the emergency department. [HS]   2034 Patient evaluated by Cardiology in the emergency department.  At this time he does not feel that this unstable angina given the fact that he is having nonexertional ongoing chest pain, no EKG changes, no elevation in troponin.  If the patient is admitted to the hospital, he would have to wait until Monday for stress testing.  The patient is electing to go home and have outpatient interventional cardiology evaluation. [HS]   2111 Cxr , Per my independent interpretation, no acute process  [HS]      ED Course User Index  [HS] Rafiq Keith MD                 Clinical Impression:   Final diagnoses:  [R07.89] Chest discomfort  [R07.9] Chest pain        ED Disposition Condition    Discharge Stable          ED Prescriptions    None       Follow-up Information       Follow up With Specialties Details Why Contact Info    Portillo Ko MD Interventional Cardiology   [facility] [facility] [address]  [phone]               Rafiq Keith MD  03/25/23 2328

## 2023-03-26 NOTE — CONSULTS
"[facility] - Emergency Dept  Cardiac Electrophysiology  Consult Note    Admission Date: 3/25/2023  Code Status: Prior   Attending Provider: Rafiq Keith MD  Consulting Provider: Vishnu Ryan MD  Principal Problem:<principal problem not specified>    Inpatient consult to Cardiology  Consult performed by: Vishnu Ryan MD  Consult ordered by: Rafiq Keith MD        Subjective:     Chief Complaint:  Chest discomfort     HPI:   58 yo male presents with chest pain. Cardiology consulted for evaluation.     Patient Active Problem List   Diagnosis    History of coronary artery bypass graft    Gout of foot    Chronic bilateral low back pain without sciatica    Essential hypertension    HLD (hyperlipidemia)    CAD (coronary artery disease)    New onset of congestive heart failure    Abnormal stress test     S/p CABG 2017 ([place]). Followed  by Dr. Lopez. Coronary angiogram 9/22 with patent grafts, and native CAD. He did not undergo intervention at that time. He was being referred to Dr. Claudio Ko for possible PCI, but has not been seen yet. He was in the ED 2 days prior with chest pain. He was awaiting transfer to [facility] (CCU service), but left the ED prior to transfer. In regards to his chest discomfort, he reports it is left sided and feels like "my heart is enlarging". He also reports sharp substernal discomfort as well at times. The discomfort described is the same as discomfort he was having in Sep '22 when he underwent his angiogram. Also reports that it is different than the discomfort he had in 2017 when he had an MI with CABG. Denies any exertional component, and actually states that walking around sometimes improves his symptoms. Discomfort can last for minutes to hours. Not actively having chest pain. Took NTG this morning, but states it does help his discomfort and only gives him a HA. EF most recently 50%.    In the ED, his is HD stable and sating well on RA. Trop negative x 1. His tp was " negative in the ED 2 days prior as well. ECG without ischemic changes. .      Past Medical History:   Diagnosis Date    Coronary artery disease     COVID-19     Gout     Hyperlipidemia     Hypertension        Past Surgical History:   Procedure Laterality Date    CORONARY ANGIOGRAPHY INCLUDING BYPASS GRAFTS WITH CATHETERIZATION OF LEFT HEART N/A 9/8/2022    Procedure: ANGIOGRAM, CORONARY, INCLUDING BYPASS GRAFT, WITH LEFT HEART CATHETERIZATION;  Surgeon: Andrews Lopez MD;  Location: [facility] CATH/EP LAB;  Service: Cardiology;  Laterality: N/A;    CORONARY ARTERY BYPASS GRAFT         Review of patient's allergies indicates:  No Known Allergies    Current Facility-Administered Medications on File Prior to Encounter   Medication    acetaminophen tablet 650 mg     Current Outpatient Medications on File Prior to Encounter   Medication Sig    aspirin 81 MG Chew Take 1 tablet (81 mg total) by mouth once daily.    atorvastatin (LIPITOR) 80 MG tablet Take 1 tablet (80 mg total) by mouth every evening.    losartan (COZAAR) 25 MG tablet Take 1 tablet (25 mg total) by mouth once daily.    metoprolol succinate (TOPROL-XL) 100 MG 24 hr tablet Take 1 tablet (100 mg total) by mouth once daily.    nitroGLYCERIN (NITROSTAT) 0.4 MG SL tablet Place 1 tablet (0.4 mg total) under the tongue every 5 (five) minutes as needed for Chest pain.    HYDROcodone-acetaminophen (NORCO) 5-325 mg per tablet Take 1 tablet by mouth every 6 (six) hours as needed for Pain.    methylPREDNISolone (MEDROL DOSEPACK) 4 mg tablet use as directed     Family History       Problem Relation (Age of Onset)    Heart disease Father          Tobacco Use    Smoking status: Former    Smokeless tobacco: Never   Substance and Sexual Activity    Alcohol use: Not Currently    Drug use: Never    Sexual activity: Yes     Partners: Female     Birth control/protection: None     Review of Systems   All other systems reviewed and are negative.  Objective:      Vital Signs (Most Recent):  Temp: 98.2 °F (36.8 °C) (03/25/23 1910)  Pulse: 60 (03/25/23 1904)  Resp: 16 (03/25/23 1904)  BP: 130/81 (03/25/23 1903)  SpO2: 97 % (03/25/23 1904) Vital Signs (24h Range):  Temp:  [98.2 °F (36.8 °C)-98.5 °F (36.9 °C)] 98.2 °F (36.8 °C)  Pulse:  [60-68] 60  Resp:  [15-18] 16  SpO2:  [96 %-99 %] 97 %  BP: (130-143)/(73-83) 130/81       Weight: 79.4 kg (175 lb)  Body mass index is 25.11 kg/m².    SpO2: 97 %       Physical Exam  General: NAD. AAO  HENT: EOMI  Neck: supple. No JVD  CV: RRR. Normal S1/S2. No gallops, rubs, or murmurs. 2+ radial pulses  Resp: CTAB. No increased work of breathing  Ext: warm. No edema.      Significant Labs: All pertinent lab results from the last 24 hours have been reviewed.    Significant Imaging:  Reviewed              Assessment and Plan:     Chest pain  Chest pain intermittently for 6 months. S/p angiogram 9/22 with patent grafts, and native CAD. He was having the same chest pain prior to his angiogram, and different from his chest pain in 2017 prior to CABG. No chest pain at this time. The characteristics of his chest pain are atypical for angina. No objective evidence of ACS. Other life threatening causes of chest pain evaluated. I discussed that we would could admit him to observation over the weekend with interventional evaluation on Monday, or if he would prefer he could be discharged from the ED with outpatient evaluation.   - Follow up second troponin  - Follow up CXR  - NTG PRN  - If discharged, outpatient follow up with IC at [facility]      Discussed case with cardiology staff on call    Thank you for your consult. will sign off unless patient is admitted    Vishnu Ryan MD  Cardiac Electrophysiology  [facility] - Emergency Dept

## 2023-04-05 DIAGNOSIS — M79.671 RIGHT FOOT PAIN: ICD-10-CM

## 2023-04-06 RX ORDER — METHYLPREDNISOLONE 4 MG/1
TABLET ORAL
Qty: 21 TABLET | OUTPATIENT
Start: 2023-04-06

## 2023-04-06 RX ORDER — HYDROCODONE BITARTRATE AND ACETAMINOPHEN 5; 325 MG/1; MG/1
TABLET ORAL
Qty: 20 TABLET | OUTPATIENT
Start: 2023-04-06

## 2023-06-01 ENCOUNTER — HOSPITAL ENCOUNTER (EMERGENCY)
Facility: HOSPITAL | Age: 58
Discharge: HOME | End: 2023-06-01
Attending: EMERGENCY MEDICINE
Payer: COMMERCIAL

## 2023-06-01 VITALS
SYSTOLIC BLOOD PRESSURE: 113 MMHG | RESPIRATION RATE: 20 BRPM | OXYGEN SATURATION: 97 % | HEART RATE: 60 BPM | TEMPERATURE: 98 F | DIASTOLIC BLOOD PRESSURE: 75 MMHG | BODY MASS INDEX: 23.8 KG/M2 | HEIGHT: 71 IN | WEIGHT: 170 LBS

## 2023-06-01 DIAGNOSIS — M10.9 ACUTE GOUTY ARTHRITIS: Primary | ICD-10-CM

## 2023-06-01 PROCEDURE — 99284 EMERGENCY DEPT VISIT MOD MDM: CPT

## 2023-06-01 RX ORDER — HYDROCODONE BITARTRATE AND ACETAMINOPHEN 5; 325 MG/1; MG/1
1 TABLET ORAL EVERY 6 HOURS PRN
Qty: 12 TABLET | Refills: 0 | Status: SHIPPED | OUTPATIENT
Start: 2023-06-01 | End: 2023-06-04

## 2023-06-01 RX ORDER — PREDNISONE 20 MG/1
20 TABLET ORAL DAILY
Qty: 12 TABLET | Refills: 0 | Status: SHIPPED | OUTPATIENT
Start: 2023-06-01 | End: 2023-08-25

## 2023-06-01 NOTE — ED NOTES
Pt is a 57 y.o male presenting in the ER for gout pain in R foot and ankle. Per pt he has not had a flair up in a couple months.

## 2023-06-01 NOTE — ED PROVIDER NOTES
Encounter Date: 6/1/2023    SCRIBE #1 NOTE: I, Shelby Valadez, am scribing for, and in the presence of,  Aretha Hunter NP.     History     Chief Complaint   Patient presents with    Gout     Right foot gout flare      Time seen by provider: 4:31 PM on 06/01/2023    Irwin Richard is a 57 y.o. male who presents to the ED with an onset of right foot pain and ankle pain that began last night. Patient denies recent injury and states that the pain feels similar to his gout. He states his last flare up was about 2 months ago. Patient is usually given Toradol, steroid pills, and Norco with relief of his flare up. The patient denies rash or any other symptoms at this time. He has a PMHx of HTN, gout, HLD, and CAD. He has a PSHx of CABG and left heart catheterization. Patient's cardiologist is Dr. Lopez. He is currently on Aspirin, Lipitor, Losartan, and Nitrostat. His Plavix was discontinued over a year ago.    The history is provided by the patient.   Review of patient's allergies indicates:  No Known Allergies  Past Medical History:   Diagnosis Date    Coronary artery disease     COVID-19     Gout     Hyperlipidemia     Hypertension      Past Surgical History:   Procedure Laterality Date    CORONARY ANGIOGRAPHY INCLUDING BYPASS GRAFTS WITH CATHETERIZATION OF LEFT HEART N/A 9/8/2022    Procedure: ANGIOGRAM, CORONARY, INCLUDING BYPASS GRAFT, WITH LEFT HEART CATHETERIZATION;  Surgeon: Andrews Lopez MD;  Location: [facility] CATH/EP LAB;  Service: Cardiology;  Laterality: N/A;    CORONARY ARTERY BYPASS GRAFT       Family History   Problem Relation Age of Onset    Heart disease Father      Social History     Tobacco Use    Smoking status: Former    Smokeless tobacco: Never   Substance Use Topics    Alcohol use: Not Currently    Drug use: Never     Review of Systems   Constitutional:  Negative for fever.   HENT:  Negative for sore throat.    Respiratory:  Negative for shortness of breath.    Cardiovascular:  Negative for chest  pain.   Gastrointestinal:  Negative for nausea.   Genitourinary:  Negative for dysuria.   Musculoskeletal:  Positive for arthralgias. Negative for back pain.   Skin:  Negative for rash.   Neurological:  Negative for weakness.   Hematological:  Does not bruise/bleed easily.     Physical Exam     Initial Vitals [06/01/23 1627]   BP Pulse Resp Temp SpO2   113/75 60 20 97.6 °F (36.4 °C) 97 %      MAP       --         Physical Exam    Nursing note and vitals reviewed.  Constitutional: Vital signs are normal. He appears well-developed and well-nourished.   HENT:   Head: Normocephalic and atraumatic.   Eyes: Pupils are equal, round, and reactive to light.   Neck: Neck supple.   Cardiovascular:  Normal rate, regular rhythm, normal heart sounds and intact distal pulses.     Exam reveals no gallop and no friction rub.       No murmur heard.  Pulses:       Dorsalis pedis pulses are 2+ on the right side.        Posterior tibial pulses are 2+ on the right side.   Pulmonary/Chest: Breath sounds normal. He has no wheezes. He has no rhonchi. He has no rales.   Abdominal: Abdomen is soft. Bowel sounds are normal. There is no abdominal tenderness.   Musculoskeletal:      Cervical back: Neck supple.      Comments: Tenderness at the base of the right great toe without swelling or erythema. Normal ROM of the right ankle. No ankle swelling or erythema.     Neurological: He is alert and oriented to person, place, and time. He has normal strength.   Skin: Skin is warm, dry and intact.   Psychiatric: He has a normal mood and affect. His speech is normal and behavior is normal.       ED Course   Procedures  Labs Reviewed - No data to display       Imaging Results    None          Medications - No data to display  Medical Decision Making:   History:   Old Medical Records: I decided to obtain old medical records.  Differential Diagnosis:   Gouty arthritis  Cellulitis  Septic joint     APC / Resident Notes:   Patient is a 57 y.o. male who  presents to the ED 06/01/2023 who underwent emergent evaluation for right foot pain radiating into his ankle.  Patient has had similar episodes of gout in the past.  Right ankle with no significant swelling or erythema.  He is able to range of motion the joint.  He is able ambulate on the joint without difficulty he denies any injury or trauma.  I do not think septic joint.  I do not think any acute fracture dislocation.  Plus two DP and PT pulses right lower extremity no signs of distal neurovascular compromise.  Patient has some tenderness over his right 1st MCP joint.  This is also consistent with previous episodes gout.  He sees his podiatrist regularly for this.  Had long discussion with patient about medications that have worked well for him the past.  He is currently not taking his allopurinol regularly.  He states he does best with steroids and avoids NSAIDs due to his history of coronary disease.  He states he uses pain medicine as needed for breakthrough pain. He is given a short course of steroids and pain medication for home and recommend close outpatient follow-up with Podiatry.  I do not think further diagnostic imaging or immobilization indicated at this time.  There is no sign of any acute infectious process requiring antibiotics at this time. Based on my clinical evaluation, I do not appreciate any immediate, emergent, or life threatening condition or etiology that warrants additional workup today and feel that the patient can be discharged with close follow up care. Follow up and return precautions discussed; patient verbalized understanding and is agreeable to plan of care. Patient discharged home in stable condition.              Scribe Attestation:   Scribe #1: I performed the above scribed service and the documentation accurately describes the services I performed. I attest to the accuracy of the note.    Attending Attestation:           Physician Attestation for Scribe:  Physician Attestation  Statement for Scribe #1: I, Aretha Hunter, reviewed documentation, as scribed by in my presence, and it is both accurate and complete.     Comments: I, OBI Perry, personally performed the services described in this documentation. All medical record entries made by the scribe were at my direction and in my presence.  I have reviewed the chart and agree that the record reflects my personal performance and is accurate and complete. OBI Perry.  8:00 PM 06/01/2023                     Clinical Impression:   Final diagnoses:  [M10.9] Acute gouty arthritis (Primary)        ED Disposition Condition    Discharge Stable          ED Prescriptions       Medication Sig Dispense Start Date End Date Auth. Provider    HYDROcodone-acetaminophen (NORCO) 5-325 mg per tablet Take 1 tablet by mouth every 6 (six) hours as needed for Pain. 12 tablet 6/1/2023 6/4/2023 Aretha Hunter NP    predniSONE (DELTASONE) 20 MG tablet Take 1 tablet (20 mg total) by mouth once daily. Take 3 tablets daily on day #1-2, Take 2 tablets daily on days #3-4, Take 1 tablet daily on day #5-6, then stop taking 12 tablet 6/1/2023 -- Aretha Hunter NP          Follow-up Information       Follow up With Specialties Details Why Contact Info    Sanjiv Blue DPM Podiatry, Surgery, Wound Care In 3 days  [address]  [address]  [facility] [address]  [phone]      [facility] Emergency Medicine  As needed, If symptoms worsen [address] [place] [id number]  [phone]             Aretha Hunter NP  06/01/23 2006

## 2023-08-11 ENCOUNTER — HOSPITAL ENCOUNTER (EMERGENCY)
Facility: HOSPITAL | Age: 58
Discharge: HOME | End: 2023-08-11
Attending: EMERGENCY MEDICINE
Payer: COMMERCIAL

## 2023-08-11 VITALS
OXYGEN SATURATION: 98 % | DIASTOLIC BLOOD PRESSURE: 90 MMHG | HEART RATE: 58 BPM | BODY MASS INDEX: 25.21 KG/M2 | SYSTOLIC BLOOD PRESSURE: 148 MMHG | TEMPERATURE: 98 F | RESPIRATION RATE: 17 BRPM | WEIGHT: 180.75 LBS

## 2023-08-11 DIAGNOSIS — M10.9 GOUT, UNSPECIFIED CAUSE, UNSPECIFIED CHRONICITY, UNSPECIFIED SITE: Primary | ICD-10-CM

## 2023-08-11 PROCEDURE — 96372 THER/PROPH/DIAG INJ SC/IM: CPT | Performed by: EMERGENCY MEDICINE

## 2023-08-11 PROCEDURE — 25000003 PHARM REV CODE 250: Performed by: EMERGENCY MEDICINE

## 2023-08-11 PROCEDURE — 99284 EMERGENCY DEPT VISIT MOD MDM: CPT

## 2023-08-11 PROCEDURE — 63600175 PHARM REV CODE 636 W HCPCS: Performed by: EMERGENCY MEDICINE

## 2023-08-11 RX ORDER — COLCHICINE 0.6 MG/1
0.6 TABLET ORAL 2 TIMES DAILY
Qty: 6 TABLET | Refills: 0 | Status: SHIPPED | OUTPATIENT
Start: 2023-08-11 | End: 2023-08-25

## 2023-08-11 RX ORDER — DEXTROMETHORPHAN HYDROBROMIDE, GUAIFENESIN 5; 100 MG/5ML; MG/5ML
650 LIQUID ORAL EVERY 8 HOURS
Qty: 100 TABLET | Refills: 0 | Status: SHIPPED | OUTPATIENT
Start: 2023-08-11 | End: 2023-08-25

## 2023-08-11 RX ORDER — IBUPROFEN 600 MG/1
600 TABLET ORAL
Status: COMPLETED | OUTPATIENT
Start: 2023-08-11 | End: 2023-08-11

## 2023-08-11 RX ORDER — ACETAMINOPHEN 325 MG/1
650 TABLET ORAL
Status: COMPLETED | OUTPATIENT
Start: 2023-08-11 | End: 2023-08-11

## 2023-08-11 RX ORDER — COLCHICINE 0.6 MG/1
1.2 TABLET, FILM COATED ORAL
Status: COMPLETED | OUTPATIENT
Start: 2023-08-11 | End: 2023-08-11

## 2023-08-11 RX ORDER — MORPHINE SULFATE 2 MG/ML
2 INJECTION, SOLUTION INTRAMUSCULAR; INTRAVENOUS
Status: COMPLETED | OUTPATIENT
Start: 2023-08-11 | End: 2023-08-11

## 2023-08-11 RX ORDER — IBUPROFEN 600 MG/1
600 TABLET ORAL EVERY 6 HOURS PRN
Qty: 50 TABLET | Refills: 0 | Status: SHIPPED | OUTPATIENT
Start: 2023-08-11 | End: 2023-08-25

## 2023-08-11 RX ADMIN — IBUPROFEN 600 MG: 600 TABLET, FILM COATED ORAL at 06:08

## 2023-08-11 RX ADMIN — COLCHICINE 1.2 MG: 0.6 TABLET, FILM COATED ORAL at 06:08

## 2023-08-11 RX ADMIN — MORPHINE SULFATE 2 MG: 2 INJECTION, SOLUTION INTRAMUSCULAR; INTRAVENOUS at 06:08

## 2023-08-11 RX ADMIN — ACETAMINOPHEN 650 MG: 325 TABLET, FILM COATED ORAL at 06:08

## 2023-08-11 NOTE — ED PROVIDER NOTES
Encounter Date: 8/11/2023       History     Chief Complaint   Patient presents with    Foot Pain     Pt reports he is having pain in great toe on right foot and runs up leg to just below knee     58-year-old male past medical history CAD, hypertension presenting with acute onset of left toe pain at 2:00 a.m. in the morning.  Patient states this is exactly the same as previous gout flare-ups, most recent flare-up 3 months ago.  Has not been taking prescribed allopurinol daily as intended, took his 1st dose today.  No fevers, no redness, no swelling, no other symptoms at this time.      Review of patient's allergies indicates:  No Known Allergies  Past Medical History:   Diagnosis Date    Coronary artery disease     COVID-19     Gout     Hyperlipidemia     Hypertension      Past Surgical History:   Procedure Laterality Date    CORONARY ANGIOGRAPHY INCLUDING BYPASS GRAFTS WITH CATHETERIZATION OF LEFT HEART N/A 9/8/2022    Procedure: ANGIOGRAM, CORONARY, INCLUDING BYPASS GRAFT, WITH LEFT HEART CATHETERIZATION;  Surgeon: Andrews Lopez MD;  Location: [facility] CATH/EP LAB;  Service: Cardiology;  Laterality: N/A;    CORONARY ARTERY BYPASS GRAFT       Family History   Problem Relation Age of Onset    Heart disease Father      Social History     Tobacco Use    Smoking status: Former     Current packs/day: 0.00    Smokeless tobacco: Never   Substance Use Topics    Alcohol use: Not Currently    Drug use: Never     Review of Systems   All other systems reviewed and are negative.      Physical Exam     Initial Vitals [08/11/23 0440]   BP Pulse Resp Temp SpO2   (!) 148/90 (!) 58 16 97.8 °F (36.6 °C) 98 %      MAP       --         Physical Exam    Vitals reviewed.  Constitutional: No distress.   HENT:   Head: Normocephalic.   Nose: Nose normal.   Cardiovascular:  Normal rate.           Pulmonary/Chest: No stridor. No respiratory distress.   Abdominal: He exhibits no distension.   Musculoskeletal:      Comments: Left great toe  exquisitely tender to palpation, range of motion limited secondary to pain, neurovascularly intact     Neurological: He is alert.   Skin: Skin is warm and dry.   Psychiatric: He has a normal mood and affect.         ED Course   Procedures  Labs Reviewed - No data to display       Imaging Results              X-Ray Toe 2 or More Views Right (In process)                   X-Rays:   Independently Interpreted Readings:   Other Readings:  No obvious fracture    Medications   colchicine capsule/tablet 1.2 mg (has no administration in time range)   ibuprofen tablet 600 mg (has no administration in time range)   acetaminophen tablet 650 mg (has no administration in time range)   morphine injection 2 mg (has no administration in time range)     Medical Decision Making:   Initial Assessment:   A/P:  Likely gout flare, do not suspect infectious causes such as osteo, cellulitis, abscess, necrotizing fasciitis.  Pain improved with medications, patient to follow-up as outpatient with rheumatology physician.  Will give strict immediate return precautions for development of an infectious symptoms, pain control, outpatient follow-up.  Clinical Tests:   Radiological Study: Ordered and Reviewed                          Clinical Impression:   Final diagnoses:  [M10.9] Gout, unspecified cause, unspecified chronicity, unspecified site (Primary)        ED Disposition Condition    Discharge Stable          ED Prescriptions       Medication Sig Dispense Start Date End Date Auth. Provider    colchicine (COLCRYS) 0.6 mg tablet Take 1 tablet (0.6 mg total) by mouth 2 (two) times daily. for 3 days 6 tablet 8/11/2023 8/14/2023 Margarito Palam MD    ibuprofen (ADVIL,MOTRIN) 600 MG tablet Take 1 tablet (600 mg total) by mouth every 6 (six) hours as needed for Pain. 50 tablet 8/11/2023 -- Margarito Palma MD    acetaminophen (TYLENOL) 650 MG TbSR Take 1 tablet (650 mg total) by mouth every 8 (eight) hours. 100 tablet 8/11/2023 -- Margarito Palma MD           Follow-up Information       Follow up With Specialties Details Why Contact Info    Your primary doctor/rheumatologist  Schedule an appointment as soon as possible for a visit                Margarito Palma MD  08/11/23 0673

## 2023-08-24 RX ORDER — ATORVASTATIN CALCIUM 80 MG/1
80 TABLET, FILM COATED ORAL NIGHTLY
Qty: 30 TABLET | Refills: 0 | OUTPATIENT
Start: 2023-08-24 | End: 2024-08-23

## 2023-08-24 RX ORDER — LOSARTAN POTASSIUM 25 MG/1
25 TABLET ORAL DAILY
Qty: 30 TABLET | Refills: 0 | OUTPATIENT
Start: 2023-08-24 | End: 2024-08-24

## 2023-08-24 NOTE — TELEPHONE ENCOUNTER
----- Message from Xuan Lundberg sent at 8/24/2023  3:33 PM CDT -----  Refill:   Name of Caller: Patient  Best Call Back Number:  [phone]  Additional Information: Patient called for refill  Prescription Name:  metoprolol succinate (TOPROL-XL) 100 MG 24 hr tablet  losartan (COZAAR) 25 MG tablet  nitroGLYCERIN (NITROSTAT) 0.4 MG SL tablet  atorvastatin (LIPITOR) 80 MG tablet  Pharmacy Name:   [id number] - [place] - [address]   Phone:  [phone]  Fax:  [phone]

## 2023-08-24 NOTE — TELEPHONE ENCOUNTER
----- Message from Krystyna Hamilton sent at 8/24/2023 11:32 AM CDT -----  Regarding: losartan (COZAAR) 25 MG tablet and atorvastatin (LIPITOR) 80 MG tablet  Type:  RX Refill Request    Who Called:  pt  Refill or New Rx:  REFILL  RX Name and Strength:  losartan (COZAAR) 25 MG tablet and atorvastatin (LIPITOR) 80 MG tablet  How is the patient currently taking it? (ex. 1XDay):  as directed  Is this a 30 day or 90 day RX:  90  Preferred Pharmacy with phone number:    [id number] - [place], [place] - [address]  [address] [address]  Phone: [phone] Fax: [phone]  Local or Mail Order:  local  Ordering Provider:  John Park Call Back Number:  [phone]  Additional Information:  Please call back to advise Thanks!

## 2023-08-24 NOTE — TELEPHONE ENCOUNTER
----- Message from Hussain Andrade sent at 8/24/2023  4:10 PM CDT -----  Contact: Self  Type: Needs Medical Advice  Who Called:  Patient    Pharmacy name and phone #:    [id number] - [place], [place] - [address]  [address]  [place] [address]  Phone: [phone] Fax: [phone]      Best Call Back Number: [phone]     Additional Information:  States he is out of losartan (COZAAR) 25 MG tablet. NOV 8/25. Would like fill sent to [facility].

## 2023-08-25 ENCOUNTER — OFFICE VISIT (OUTPATIENT)
Dept: CARDIOLOGY | Facility: CLINIC | Age: 58
End: 2023-08-25
Payer: COMMERCIAL

## 2023-08-25 VITALS
BODY MASS INDEX: 25.34 KG/M2 | SYSTOLIC BLOOD PRESSURE: 114 MMHG | WEIGHT: 181 LBS | HEIGHT: 71 IN | OXYGEN SATURATION: 96 % | HEART RATE: 57 BPM | DIASTOLIC BLOOD PRESSURE: 76 MMHG

## 2023-08-25 DIAGNOSIS — I10 ESSENTIAL HYPERTENSION: ICD-10-CM

## 2023-08-25 DIAGNOSIS — I25.10 CORONARY ARTERY DISEASE INVOLVING NATIVE CORONARY ARTERY OF NATIVE HEART WITHOUT ANGINA PECTORIS: ICD-10-CM

## 2023-08-25 DIAGNOSIS — Z95.1 HISTORY OF CORONARY ARTERY BYPASS GRAFT: ICD-10-CM

## 2023-08-25 DIAGNOSIS — E78.5 DYSLIPIDEMIA: Primary | ICD-10-CM

## 2023-08-25 PROCEDURE — 3008F BODY MASS INDEX DOCD: CPT | Mod: CPTII,S$GLB,, | Performed by: NURSE PRACTITIONER

## 2023-08-25 PROCEDURE — 99214 PR OFFICE/OUTPT VISIT, EST, LEVL IV, 30-39 MIN: ICD-10-PCS | Mod: S$GLB,,, | Performed by: NURSE PRACTITIONER

## 2023-08-25 PROCEDURE — 99999 PR PBB SHADOW E&M-EST. PATIENT-LVL III: ICD-10-PCS | Mod: PBBFAC,,, | Performed by: NURSE PRACTITIONER

## 2023-08-25 PROCEDURE — 99999 PR PBB SHADOW E&M-EST. PATIENT-LVL III: CPT | Mod: PBBFAC,,, | Performed by: NURSE PRACTITIONER

## 2023-08-25 PROCEDURE — 3074F PR MOST RECENT SYSTOLIC BLOOD PRESSURE < 130 MM HG: ICD-10-PCS | Mod: CPTII,S$GLB,, | Performed by: NURSE PRACTITIONER

## 2023-08-25 PROCEDURE — 3008F PR BODY MASS INDEX (BMI) DOCUMENTED: ICD-10-PCS | Mod: CPTII,S$GLB,, | Performed by: NURSE PRACTITIONER

## 2023-08-25 PROCEDURE — 1159F MED LIST DOCD IN RCRD: CPT | Mod: CPTII,S$GLB,, | Performed by: NURSE PRACTITIONER

## 2023-08-25 PROCEDURE — 3078F DIAST BP <80 MM HG: CPT | Mod: CPTII,S$GLB,, | Performed by: NURSE PRACTITIONER

## 2023-08-25 PROCEDURE — 99214 OFFICE O/P EST MOD 30 MIN: CPT | Mod: S$GLB,,, | Performed by: NURSE PRACTITIONER

## 2023-08-25 PROCEDURE — 1159F PR MEDICATION LIST DOCUMENTED IN MEDICAL RECORD: ICD-10-PCS | Mod: CPTII,S$GLB,, | Performed by: NURSE PRACTITIONER

## 2023-08-25 PROCEDURE — 3078F PR MOST RECENT DIASTOLIC BLOOD PRESSURE < 80 MM HG: ICD-10-PCS | Mod: CPTII,S$GLB,, | Performed by: NURSE PRACTITIONER

## 2023-08-25 PROCEDURE — 3074F SYST BP LT 130 MM HG: CPT | Mod: CPTII,S$GLB,, | Performed by: NURSE PRACTITIONER

## 2023-08-25 PROCEDURE — 4010F ACE/ARB THERAPY RXD/TAKEN: CPT | Mod: CPTII,S$GLB,, | Performed by: NURSE PRACTITIONER

## 2023-08-25 PROCEDURE — 4010F PR ACE/ARB THEARPY RXD/TAKEN: ICD-10-PCS | Mod: CPTII,S$GLB,, | Performed by: NURSE PRACTITIONER

## 2023-08-25 RX ORDER — LOSARTAN POTASSIUM 25 MG/1
25 TABLET ORAL DAILY
Qty: 90 TABLET | Refills: 3 | Status: SHIPPED | OUTPATIENT
Start: 2023-08-25 | End: 2023-08-25

## 2023-08-25 RX ORDER — LOSARTAN POTASSIUM 25 MG/1
25 TABLET ORAL DAILY
Qty: 90 TABLET | Refills: 3 | Status: SHIPPED | OUTPATIENT
Start: 2023-08-25 | End: 2023-11-28 | Stop reason: SDUPTHER

## 2023-08-25 NOTE — PROGRESS NOTES
[facility]    Subjective:     Patient ID:  Irwin Richard is a 58 y.o. male patient here for evaluation Follow-up and Medication Refill      HPI    Irwin Richard is here for follow-up visit. Denies chest pain or shortness of breath. Denies recent fever cough chills or congestion. Denies blood in the urine or blood in the stool.  Denies myalgias. Denies orthopnea or peripheral edema. Denies nausea vomiting or dyspepsia. No recent arm neck or jaw pain. No lightheadedness or dizziness.       ROS   PER HPI    Past Medical History:   Diagnosis Date    Coronary artery disease     COVID-19     Gout     Hyperlipidemia     Hypertension        Past Surgical History:   Procedure Laterality Date    CORONARY ANGIOGRAPHY INCLUDING BYPASS GRAFTS WITH CATHETERIZATION OF LEFT HEART N/A 9/8/2022    Procedure: ANGIOGRAM, CORONARY, INCLUDING BYPASS GRAFT, WITH LEFT HEART CATHETERIZATION;  Surgeon: Andrews Lopez MD;  Location: [facility] CATH/EP LAB;  Service: Cardiology;  Laterality: N/A;    CORONARY ARTERY BYPASS GRAFT         Family History   Problem Relation Age of Onset    Heart disease Father        Social History     Socioeconomic History    Marital status: Single   Tobacco Use    Smoking status: Former    Smokeless tobacco: Never   Substance and Sexual Activity    Alcohol use: Not Currently    Drug use: Never    Sexual activity: Yes     Partners: Female     Birth control/protection: None       Current Outpatient Medications   Medication Sig Dispense Refill    atorvastatin (LIPITOR) 80 MG tablet Take 1 tablet (80 mg total) by mouth every evening. 90 tablet 0    metoprolol succinate (TOPROL-XL) 100 MG 24 hr tablet Take 1 tablet (100 mg total) by mouth once daily. 90 tablet 3    nitroGLYCERIN (NITROSTAT) 0.4 MG SL tablet Place 1 tablet (0.4 mg total) under the tongue every 5 (five) minutes as needed for Chest pain. 15 tablet 0    aspirin 81 MG Chew Take 1 tablet (81  mg total) by mouth once daily. 90 tablet 0    losartan (COZAAR) 25 MG tablet Take 1 tablet (25 mg total) by mouth once daily. 90 tablet 3     Current Facility-Administered Medications   Medication Dose Route Frequency Provider Last Rate Last Admin    acetaminophen tablet 650 mg  650 mg Oral Once SANTON Cristina Hammond MD           Review of patient's allergies indicates:  No Known Allergies      Objective:        Vitals:    08/25/23 1006   BP: 114/76   Pulse: (!) 57       Physical Exam  Vitals reviewed.   Constitutional:       Appearance: Normal appearance.   HENT:      Head: Normocephalic.      Mouth/Throat:      Mouth: Mucous membranes are dry.   Cardiovascular:      Rate and Rhythm: Normal rate and regular rhythm.      Pulses: Normal pulses.      Heart sounds: Normal heart sounds.   Pulmonary:      Effort: Pulmonary effort is normal.      Breath sounds: Normal breath sounds.   Musculoskeletal:         General: Normal range of motion.      Cervical back: Normal range of motion.   Skin:     General: Skin is warm and dry.      Capillary Refill: Capillary refill takes less than 2 seconds.   Neurological:      General: No focal deficit present.      Mental Status: He is alert and oriented to person, place, and time. Mental status is at baseline.   Psychiatric:         Mood and Affect: Mood normal.         Behavior: Behavior normal.         Thought Content: Thought content normal.         Judgment: Judgment normal.         LIPIDS - LAST 2   Lab Results   Component Value Date    CHOL 202 (H) 07/29/2022    HDL 52 07/29/2022    LDLCALC 134.6 07/29/2022    TRIG 77 07/29/2022    CHOLHDL 25.7 07/29/2022       CBC - LAST 2  Lab Results   Component Value Date    WBC 6.24 03/25/2023    WBC 6.36 03/23/2023    RBC 3.97 (L) 03/25/2023    RBC 4.35 (L) 03/23/2023    HGB 12.0 (L) 03/25/2023    HGB 13.0 (L) 03/23/2023    HCT 35 (L) 03/25/2023    HCT 36.2 (L) 03/25/2023    MCV 91 03/25/2023    MCV 92 03/23/2023    MCH 30.2 03/25/2023     "MCH 29.9 03/23/2023    MCHC 33.1 03/25/2023    MCHC 32.5 03/23/2023    RDW 11.8 03/25/2023    RDW 11.8 03/23/2023     03/25/2023     03/23/2023    MPV 10.0 03/25/2023    MPV 10.3 03/23/2023    GRAN 3.0 03/25/2023    GRAN 47.9 03/25/2023    LYMPH 2.4 03/25/2023    LYMPH 37.8 03/25/2023    MONO 0.6 03/25/2023    MONO 9.3 03/25/2023    BASO 0.03 03/25/2023    BASO 0.03 03/23/2023    NRBC 0 03/25/2023    NRBC 0 03/23/2023       CHEMISTRY & LIVER FUNCTION - LAST 2  Lab Results   Component Value Date     03/25/2023     03/23/2023    K 4.0 03/25/2023    K 4.0 03/23/2023     (H) 03/25/2023     03/23/2023    CO2 22 (L) 03/25/2023    CO2 25 03/23/2023    ANIONGAP 11 03/25/2023    ANIONGAP 7 (L) 03/23/2023    BUN 11 03/25/2023    BUN 9 03/23/2023    CREATININE 1.0 03/25/2023    CREATININE 1.0 03/23/2023    GLU 98 03/25/2023    GLU 93 03/23/2023    CALCIUM 9.2 03/25/2023    CALCIUM 9.5 03/23/2023    MG 2.1 07/29/2022    ALBUMIN 3.7 03/25/2023    ALBUMIN 4.1 03/23/2023    PROT 6.7 03/25/2023    PROT 7.1 03/23/2023    ALKPHOS 85 03/25/2023    ALKPHOS 80 03/23/2023    ALT 15 03/25/2023    ALT 25 03/23/2023    AST 18 03/25/2023    AST 22 03/23/2023    BILITOT 0.5 03/25/2023    BILITOT 0.8 03/23/2023        CARDIAC PROFILE - LAST 2  Lab Results   Component Value Date     (H) 03/25/2023    BNP 96 03/23/2023    TROPONINI 0.020 03/25/2023    TROPONINI 0.015 03/25/2023    TROPONINI 0.017 03/25/2023        COAGULATION - LAST 2  No results found for: "LABPT", "INR", "APTT"    ENDOCRINE & PSA - LAST 2  Lab Results   Component Value Date    HGBA1C 5.7 (H) 07/29/2022    TSH 3.808 07/29/2022        ECHOCARDIOGRAM RESULTS  Results for orders placed during the hospital encounter of 07/28/22    Echo    Interpretation Summary  · The left ventricle is normal in size with concentric remodeling and low normal systolic function.  · The estimated ejection fraction is 50%.  · Normal left ventricular " diastolic function.  · There is mild left ventricular global hypokinesis.  · Normal right ventricular size with normal right ventricular systolic function.  · Normal central venous pressure (3 mmHg).  · The estimated PA systolic pressure is 24 mmHg.      CURRENT/PREVIOUS VISIT EKG  Results for orders placed or performed during the hospital encounter of 03/25/23   EKG 12-lead    Collection Time: 03/25/23  5:59 PM    Narrative    Test Reason : R07.9,    Vent. Rate : 062 BPM     Atrial Rate : 062 BPM     P-R Int : 172 ms          QRS Dur : 084 ms      QT Int : 396 ms       P-R-T Axes : 041 -01 062 degrees     QTc Int : 401 ms    Normal sinus rhythm  Normal ECG  When compared with ECG of 25-MAR-2023 16:06,  No significant change was found  Confirmed by Margarito GUZMAN MD ([id number]) on 3/26/2023 9:42:41 AM    Referred By: [email or web address]   SELF           Confirmed By:Margarito GUZMAN MD     No valid procedures specified.   Results for orders placed during the hospital encounter of 07/28/22    Nuclear Stress Test    Interpretation Summary    The EKG portion of this study is negative for ischemia.    The patient reported no chest pain during the stress test.    During stress, rare PVCs are noted.    The nuclear portion of this study will be reported separately.        Assessment:       1. Dyslipidemia    2. Essential hypertension    3. Coronary artery disease involving native coronary artery of native heart without angina pectoris    4. History of coronary artery bypass graft                 Problem List Items Addressed This Visit          Cardiac/Vascular    History of coronary artery bypass graft    Essential hypertension    Current Assessment & Plan     bp stable 114/76  Continue Metoprolol 100 daily and Losartan 25 mg daily.          Dyslipidemia - Primary    CAD (coronary artery disease)    Current Assessment & Plan     H/O CABG  No anginal symptoms  Continue ASA and Statin  Check Lipid and CMP               Keesha Mckee NP  [place]  [facility] [place] [facility]

## 2023-08-29 RX ORDER — ATORVASTATIN CALCIUM 80 MG/1
80 TABLET, FILM COATED ORAL DAILY
Qty: 90 TABLET | Refills: 3 | Status: SHIPPED | OUTPATIENT
Start: 2023-08-29 | End: 2023-11-26 | Stop reason: SDUPTHER

## 2023-09-27 NOTE — DISCHARGE SUMMARY
CHIEF COMPLAINT:  Chief Complaint   Patient presents with   • Back Pain   • Shortness of Breath     Pt states low back pain last week today having upper back pain and shortness of breath.  Seen by urgent care sent over for evaluation.    HPI  Laura Edmonds is a 36 year old female who presents secondary to chest pain and back pain.  A cursory review of her chart over last year does show she has roughly monthly visits to either the walk-in clinic her primary care provider or the emergency department.    She describes that last night she did begin with some discomfort across her upper back.  With his new to her.  It does not radiate.  She did notice slight shortness of breath this morning when she was carrying her child into school.  She denies any history of PE or DVT.  She denies any known cardiac issues.  She is not on anticoagulation.  She denies any recent fever or chills.  She is not coughing.    She is without abdominal pain nausea or vomiting.  She was seen by urgent care and then sent to the emergency department for this reason.    All ROS negative except as above in the HPI    ALLERGIES:  ALLERGIES:  No Known Allergies    CURRENT MEDICATIONS:  Current Facility-Administered Medications   Medication   • copper (PARAGARD) intrauterine device 1 each     Current Outpatient Medications   Medication Sig   • ketorolac (TORADOL) 10 MG tablet Take 1 tablet by mouth every 6 hours as needed for Pain.   • methylPREDNISolone (MEDROL, DELTA,) 4 MG tablet Take 1 tablet by mouth as directed. follow package directions   • tiZANidine (ZANAFLEX) 4 MG tablet Take 1 tablet by mouth every 8 hours as needed (pain).   • rosuvastatin (CRESTOR) 5 MG tablet TAKE 1 TABLET BY MOUTH 1 TIME EVERY OTHER DAY   • ALPRAZolam (XANAX) 0.5 MG tablet TAKE 1 TABLET BY MOUTH TWICE DAILY AS NEEDED FOR ANXIETY   • ondansetron (ZOFRAN ODT) 4 MG disintegrating tablet Place 1 tablet onto the tongue every 8 hours as needed for Nausea.   • escitalopram  [facility] Medicine  Discharge Summary      Patient Name: Irwin Richard  MRN: [id number]  Patient Class: IP- Inpatient  Admission Date: 7/28/2022  Hospital Length of Stay: 1 days  Discharge Date and Time: 7/30/2022  5:10 PM  Attending Physician: No att. providers found   Discharging Provider: Allison Patel NP  Primary Care Provider: Primary Doctor Sneha      HPI:   Irwin Richard is a 57 year old male with a past medical history of HTN, HLD, CAD, chronic back pain and gout, with a past surgical history of quadruple cardiac bypass in 2016, who presented to the ED with a complaint of left sided chest pain. He states it began around noon yesterday, sudden onset with associated SOB, left arm numbness and dizziness. He states its been persistent since the onset. He denies diaphoresis, nausea, or vomiting. He also reports right great toe pain secondary to gout, which he has a long history of.  He denies all other complaints. ED work up was significant for negative troponin x 2. BNP elevated at 126 and CXR showed pulmonary edema. The patient denies ever being told he has heart failure, but states he would not doubt it given his extensive cardiac history. EKG shows no acute ST elevation or depression. Hospital Medicine consulted for admission and further management.       * No surgery found *      Hospital Course:   Patient was admitted with left-sided chest pain and acute gout attack.  EKG was obtained, troponins were trended, and patient had chest x-ray which revealed mild pulmonary edema.  He was placed on CHF pathway and Cardiology was consulted.  His troponins remained negative.  Echocardiogram was obtained and did not noted any structural defects or heart failure.  Patient underwent nuclear stress testing.  He was placed on aspirin and Lipitor.  He was monitored on continuous telemetry.  His vital signs were trended closely.  He was initiated on nitropaste for his HTN.  For his gout he was  (LEXAPRO) 10 MG tablet Take 1.5 tablets by mouth daily.   • metFORMIN (GLUCOPHAGE) 500 MG tablet Take 2 tablets by mouth daily (with breakfast).       PAST MEDICAL HISTORY:  Past Medical History:   Diagnosis Date   • Anxiety    • PCOS (polycystic ovarian syndrome) 2012       SURGICAL HISTORY:  Past Surgical History:   Procedure Laterality Date   • Laparoscopic appendectomy  2007       SOCIAL HISTORY:  Social History     Tobacco Use   • Smoking status: Never   • Smokeless tobacco: Never   Vaping Use   • Vaping Use: never used   Substance Use Topics   • Alcohol use: No   • Drug use: No       FAMILY HISTORY:  Family History   Problem Relation Age of Onset   • Patient is unaware of any medical problems Mother    • Heart disease Father         CAD with MI and stents; first MI at age 35   • Clotting Disorder Father         DVT's but unknown if there is a disorder   • Anxiety disorder Sister    • Anxiety disorder Sister    • Patient is unaware of any medical problems Brother    • Patient is unaware of any medical problems Brother    • Cancer, Breast Maternal Grandmother 80       REVIEW OF SYSTEMS:  All ROS negative except as above in the HPI    PHYSICAL EXAM:  ED Triage Vitals [09/27/23 0807]   ED Triage Vitals Group      Temp 99 °F (37.2 °C)      Heart Rate (!) 116      Resp (!) 20      BP (!) 155/88      SpO2 97 %      EtCO2 mmHg       Height 5' 7\" (1.702 m)      Weight 202 lb (91.6 kg)      Weight Scale Used Standing scale      BMI (Calculated) 31.64      IBW/kg (Calculated) 61.6     Vitals reviewed per nursing notes.  General:  Patient is in no acute distress, non toxic appearing, alert, pleasant & appropriate.  Head: Atraumatic, normal cephalic.  Eyes: No ptosis, edema or lesions of the lid.  Mouth:   Normal voice. No obvious obstructions or apparent discomfort.  Neck: Supple, no obvious masses or limitations of movement.  Lungs:  No respiratory distress. No labored breathing.  Lungs are clear to auscultation  placed on steroids and colchicine.  He was given oral narcotics for his pain.  He was cleared from cardiology perspective for discharge.  Discharge instructions with return precautions were discussed with patient with good understanding.    Physical exam:  Awake alert oriented x4, no acute distress  Cardiac:  RRR  Pulmonary:  Clear to auscultation  Abdomen:  Soft, nontender  Extremities: normal range of motion         Goals of Care Treatment Preferences:  Code Status: Full Code      Consults:   Consults (From admission, onward)        Status Ordering Provider     Inpatient consult to Social Work/Case Management  Once        Provider:  (Not yet assigned)    TIEN Colvin new Assessment & Plan notes have been filed under this hospital service since the last note was generated.  Service: Hospital Medicine    Final Active Diagnoses:    Diagnosis Date Noted POA    PRINCIPAL PROBLEM:  CAD (coronary artery disease) [I25.10] 07/28/2022 Yes    New onset of congestive heart failure [I50.9] 07/29/2022 Yes    HTN (hypertension) [I10] 07/28/2022 Yes    HLD (hyperlipidemia) [E78.5] 07/28/2022 Yes    Gout of foot [M10.9] 04/05/2021 Yes    History of coronary artery bypass graft [Z95.1] 01/15/2020 Not Applicable    Chronic bilateral low back pain without sciatica [M54.50, G89.29] 01/15/2020 Yes      Problems Resolved During this Admission:       Discharged Condition: good    Disposition: Home or Self Care    Follow Up:   Follow-up Information     Primary Doctor No Follow up in 1 week(s).    Why: Please make another appointment with Dr. Webber's office as discussed to establish care.           Zeb Torres MD Follow up in 2 week(s).    Specialties: Cardiology, Cardiovascular Disease  Contact information:  [address] [place] [address]  [address]  [place] [address]  [phone]                       Patient Instructions:      Diet Cardiac     Notify your health care provider if you experience any of the following:   severe uncontrolled pain     Notify your health care provider if you experience any of the following:  temperature >100.4     Activity as tolerated       Significant Diagnostic Studies: Labs:   CMP   Recent Labs   Lab 07/30/22  0508      K 3.9      CO2 28   GLU 87   BUN 13   CREATININE 0.9   CALCIUM 9.2   ANIONGAP 12   ESTGFRAFRICA >60   EGFRNONAA >60   , CBC   Recent Labs   Lab 07/30/22  0508   WBC 7.58   HGB 14.1   HCT 42.6      , Troponin   Recent Labs   Lab 07/28/22  2036 07/28/22  2308 07/29/22  0525   TROPONINI <0.006 0.008 <0.006    and All labs within the past 24 hours have been reviewed  Radiology:     Echo    Interpretation Summary  · The left ventricle is normal in size with concentric remodeling and low normal systolic function.  · The estimated ejection fraction is 50%.  · Normal left ventricular diastolic function.  · There is mild left ventricular global hypokinesis.  · Normal right ventricular size with normal right ventricular systolic function.  · Normal central venous pressure (3 mmHg).  · The estimated PA systolic pressure is 24 mmHg.    XR CHEST AP PORTABLE     CLINICAL HISTORY:   Chest pain, unspecified     TECHNIQUE:   Single frontal view of the chest was performed.     COMPARISON:   None     FINDINGS:   The lungs are well expanded.  There is mild interstitial prominence and pulmonary vascular congestion.  Suture material overlies the left upper lung.  The pleural spaces are clear.     The cardiac silhouette is enlarged.  There are atherosclerotic calcifications of the aortic arch.     The visualized osseous structures demonstrate degenerative changes.  There are median sternotomy wires.   Impression:    Mild interstitial prominence and pulmonary vascular congestion, suspicious for mild edema.     Cardiomegaly.       Electronically signed by: Jose Page   Date: 07/28/2022   Time: 22:00   NM MYOCARDIAL PERFUSION SPECT MULTI PHARM     CLINICAL HISTORY:   Chest pain/anginal  bilaterally.  Heart:  Regular rate without murmur.  Abdomen:  No apparent abdominal discomfort. No peritoneal findings.  Skin: Without rash or petechiae. No sign of trauma.   Extremities:  Without clubbing, cyanosis, or edema.  Musculoskeletal:  Activity does not suggest any skeletal pain or limitations of motion.   Neuro:  Moves all extremities spontaneously. Able to understand and respond normally to questions.  Psych:  Mood and affect are normal.  Judgement is normal.    Abnormal Lab Results  Labs Reviewed   CBC WITH AUTOMATED DIFFERENTIAL (PERFORMABLE ONLY) - Abnormal; Notable for the following components:       Result Value    WBC 11.7 (*)     Absolute Neutrophils 8.0 (*)     All other components within normal limits    Narrative:     This is an appended report.  These results have been appended to a previously verified report.   TROPONIN I, HIGH SENSITIVITY - Normal   NT PROBNP - Normal   D DIMER, QUANTITATIVE - Normal    Narrative:     Values < 0.50 mg/L(FEU) may be useful to help exclude DVT or PE in patients at low clinical risk for these disorders.  For patients above the age of 50, the [employer] college [employer] recommends using age adjusted cutoff values. AGE X 0.01 calculates the age adjusted cutoff value in mg/L for these patient populations.   CBC WITH DIFFERENTIAL    Narrative:     The following orders were created for panel order CBC with Automated Differential.  Procedure                               Abnormality         Status                     ---------                               -----------         ------                     CBC with Automated Dif...[[id number]]  Abnormal            Final result                 Please view results for these tests on the individual orders.   COMPREHENSIVE METABOLIC PANEL       Radiology  Imaging Results          XR CHEST PA OR AP 1 VIEW (Final result)  Result time 09/27/23 09:21:20    Final result                 Impression:    IMPRESSION: No evidence  equiv, high CAD risk, not treadmill candidate;     TECHNIQUE:   SPECT images in short, vertical and horizontal long axis were acquired 30 minutes after the injection of 11.8 mCi of Tc-99m tetrofosmin at rest and 33 mCi during a cardiac stress. The clinical stress and ECG portion of the study is to be read separately.     COMPARISON:   None.     FINDINGS:   The quality of the study is good.     Stress SPECT images demonstrate a large focus of absent tracer accumulation in the lateral wall extending from the anterior to the inferior wall and from the apex nursing home to the base.  This large defect is unchanged on the rest images without reversible ischemia demonstrated.  This is an unusual pattern distribution.     The gated post-stress images reveal mildly impaired wall motion and diminished with an estimated LVEF of 50 %. The LV cavity is not dilated with an end-diastolic volume of 99 ml and an end-systolic volume of 50 ml.   Impression:    There is a large defect in tracer accumulation in the lateral wall of the left ventricle extending from the anterior to the inferior wall and from the apex half way to the base on both the stress and rest imaging.  This is consistent with a myocardial infarction without reversible ischemia.     There is mild decreased ejection fraction at 50% without left ventricular dilation.       Electronically signed by: Emre Laguna MD   Date: 07/29/2022   Time: 11:29     Pending Diagnostic Studies:     None         Medications:  Reconciled Home Medications:      Medication List      START taking these medications    aspirin 81 MG Chew  Take 1 tablet (81 mg total) by mouth once daily.     atorvastatin 40 MG tablet  Commonly known as: LIPITOR  Take 1 tablet (40 mg total) by mouth once daily.     HYDROcodone-acetaminophen 7.5-325 mg per tablet  Commonly known as: NORCO  Take 1 tablet by mouth every 6 (six) hours as needed for Pain.     indomethacin 50 MG capsule  Commonly known as:  of active pulmonary disease      Electronically Signed by: Ryan Lauren MD  Signed on: 9/27/2023 9:21 AM  Created on Workstation ID: [id number]  Signed on Workstation ID: [id number]             Narrative:    XR CHEST AP OR PA    INDICATION: cp    COMPARISON: AP chest performed on 11/21/2022.    FINDINGS: Both lung fields appear clear. No evidence of a pneumothorax nor  pleural effusion                                Last Vital Signs  Vitals:    09/27/23 0807   BP: (!) 155/88   Pulse: (!) 116   Resp: (!) 20   Temp: 99 °F (37.2 °C)   TempSrc: Oral   SpO2: 97%   Weight: 91.6 kg (202 lb)   Height: 5' 7\" (1.702 m)   LMP: 09/14/2023       Treatment in ED:  ED Medication Orders (From admission, onward)    Ordered Start     Status Ordering Provider    09/27/23 0812 09/27/23 0813  ketorolac (TORADOL) injection 15 mg  ONCE         Last MAR action: Given ARISTIDES VARGAS          ED Course as of 09/27/23 0934   Wed Sep 27, 2023   0823 My interpretation the EKG: Sinus tachycardia rate of 107, normal axis, normal intervals, no ST or T-wave changes consistent with ischemia. [MH]      ED Course User Index  [MH] Aristides Vargas, DO       Symptoms most likely associated with musculoskeletal back pain.    Diagnosis:  1. Acute upper back pain  SERVICE TO PHYSICAL THERAPY          Follow Up:  No follow-up provider specified.   Please understand this is a provisional diagnosis that can and may change. The diagnosis that you are discharged with is based on the symptoms you described and the diagnostic information available today. If any new symptoms occur or worsen, you should seek immediate re-evaluation at the nearest ED. If any symptoms persist, please follow up for reassessment.    DISCLAIMER  The 21st Century Cures Act makes medical notes like these available to patients in the interest of transparency. However, be advised this is a medical document. It is intended as peer to peer communication. It is written in medical  INDOCIN  Take 1 capsule (50 mg total) by mouth 3 (three) times daily with meals.     methylPREDNISolone 4 mg tablet  Commonly known as: MEDROL DOSEPACK  use as directed     nitroGLYCERIN 0.4 MG SL tablet  Commonly known as: NITROSTAT  Place 1 tablet (0.4 mg total) under the tongue every 5 (five) minutes as needed for Chest pain.        CONTINUE taking these medications    ibuprofen 600 MG tablet  Commonly known as: ADVIL,MOTRIN  Take 1 tablet (600 mg total) by mouth every 6 (six) hours as needed for Pain.     losartan 25 MG tablet  Commonly known as: COZAAR  Take 1 tablet (25 mg total) by mouth once daily.     metoprolol succinate 100 MG 24 hr tablet  Commonly known as: TOPROL-XL  Take 1 tablet (100 mg total) by mouth once daily.            Indwelling Lines/Drains at time of discharge:   Lines/Drains/Airways     None                 Time spent on the discharge of patient: 38 minutes         Allison Patel NP  Department of Hospital Medicine  [facility]   language and may contain abbreviations or verbiage that are unfamiliar to the general public. It may appear blunt or direct. Medical documents are intended to carry relevant information, facts as evident, and the clinical opinion of the practitioner. This document is not intended to be a comprehensive summary of the medical record. Plans may change based on information that is not conveyed in this note.    Treatment:     Summary of your Discharge Medications      Take these Medications      Details   ketorolac 10 MG tablet  Commonly known as: TORADOL   Take 1 tablet by mouth every 6 hours as needed for Pain.             Aristides Christian, DO  09/27/23 0935

## 2023-11-28 RX ORDER — LOSARTAN POTASSIUM 25 MG/1
25 TABLET ORAL DAILY
Qty: 90 TABLET | Refills: 3 | Status: SHIPPED | OUTPATIENT
Start: 2023-11-28 | End: 2024-11-28

## 2023-11-28 RX ORDER — ATORVASTATIN CALCIUM 80 MG/1
80 TABLET, FILM COATED ORAL DAILY
Qty: 90 TABLET | Refills: 3 | Status: SHIPPED | OUTPATIENT
Start: 2023-11-28 | End: 2024-11-27

## 2023-11-28 RX ORDER — METOPROLOL SUCCINATE 100 MG/1
100 TABLET, EXTENDED RELEASE ORAL DAILY
Qty: 90 TABLET | Refills: 3 | Status: SHIPPED | OUTPATIENT
Start: 2023-11-28 | End: 2024-11-28

## 2023-11-28 RX ORDER — ATORVASTATIN CALCIUM 80 MG/1
80 TABLET, FILM COATED ORAL NIGHTLY
Qty: 90 TABLET | Refills: 3 | Status: SHIPPED | OUTPATIENT
Start: 2023-11-28 | End: 2024-11-27

## 2023-11-28 NOTE — TELEPHONE ENCOUNTER
----- Message from Marta Joiner sent at 11/28/2023  2:26 PM CST -----  Regarding: RX Refill Request for today  Contact: patient at [phone]  Type:  RX Refill Request for today    Who Called:  patient at [phone]    Preferred Pharmacy with phone number:   [id number] - [place] - [address] AT [facility] [address]  [address] [address]  Phone: [phone] Fax: [phone]      Additional Information:  patient is calling to get the 3 medications filled for today. Please call and advise. Thank you    atorvastatin (LIPITOR) 80 MG tablet    losartan (COZAAR) 25 MG tablet    metoprolol succinate (TOPROL-XL) 100 MG 24 hr tablet

## 2023-11-30 ENCOUNTER — OFFICE VISIT (OUTPATIENT)
Dept: PAIN MEDICINE | Facility: CLINIC | Age: 58
End: 2023-11-30
Payer: COMMERCIAL

## 2023-11-30 VITALS
HEART RATE: 67 BPM | HEIGHT: 71 IN | SYSTOLIC BLOOD PRESSURE: 114 MMHG | DIASTOLIC BLOOD PRESSURE: 76 MMHG | WEIGHT: 181 LBS | BODY MASS INDEX: 25.34 KG/M2

## 2023-11-30 DIAGNOSIS — M54.9 BACK PAIN, UNSPECIFIED BACK LOCATION, UNSPECIFIED BACK PAIN LATERALITY, UNSPECIFIED CHRONICITY: Primary | ICD-10-CM

## 2023-11-30 DIAGNOSIS — M25.511 RIGHT SHOULDER PAIN, UNSPECIFIED CHRONICITY: ICD-10-CM

## 2023-11-30 PROCEDURE — 1159F PR MEDICATION LIST DOCUMENTED IN MEDICAL RECORD: ICD-10-PCS | Mod: CPTII,S$GLB,, | Performed by: STUDENT IN AN ORGANIZED HEALTH CARE EDUCATION/TRAINING PROGRAM

## 2023-11-30 PROCEDURE — 3074F SYST BP LT 130 MM HG: CPT | Mod: CPTII,S$GLB,, | Performed by: STUDENT IN AN ORGANIZED HEALTH CARE EDUCATION/TRAINING PROGRAM

## 2023-11-30 PROCEDURE — 3008F BODY MASS INDEX DOCD: CPT | Mod: CPTII,S$GLB,, | Performed by: STUDENT IN AN ORGANIZED HEALTH CARE EDUCATION/TRAINING PROGRAM

## 2023-11-30 PROCEDURE — 3074F PR MOST RECENT SYSTOLIC BLOOD PRESSURE < 130 MM HG: ICD-10-PCS | Mod: CPTII,S$GLB,, | Performed by: STUDENT IN AN ORGANIZED HEALTH CARE EDUCATION/TRAINING PROGRAM

## 2023-11-30 PROCEDURE — 99999 PR PBB SHADOW E&M-EST. PATIENT-LVL IV: CPT | Mod: PBBFAC,,, | Performed by: STUDENT IN AN ORGANIZED HEALTH CARE EDUCATION/TRAINING PROGRAM

## 2023-11-30 PROCEDURE — 3008F PR BODY MASS INDEX (BMI) DOCUMENTED: ICD-10-PCS | Mod: CPTII,S$GLB,, | Performed by: STUDENT IN AN ORGANIZED HEALTH CARE EDUCATION/TRAINING PROGRAM

## 2023-11-30 PROCEDURE — 1160F RVW MEDS BY RX/DR IN RCRD: CPT | Mod: CPTII,S$GLB,, | Performed by: STUDENT IN AN ORGANIZED HEALTH CARE EDUCATION/TRAINING PROGRAM

## 2023-11-30 PROCEDURE — 99999 PR PBB SHADOW E&M-EST. PATIENT-LVL IV: ICD-10-PCS | Mod: PBBFAC,,, | Performed by: STUDENT IN AN ORGANIZED HEALTH CARE EDUCATION/TRAINING PROGRAM

## 2023-11-30 PROCEDURE — 3078F PR MOST RECENT DIASTOLIC BLOOD PRESSURE < 80 MM HG: ICD-10-PCS | Mod: CPTII,S$GLB,, | Performed by: STUDENT IN AN ORGANIZED HEALTH CARE EDUCATION/TRAINING PROGRAM

## 2023-11-30 PROCEDURE — 1160F PR REVIEW ALL MEDS BY PRESCRIBER/CLIN PHARMACIST DOCUMENTED: ICD-10-PCS | Mod: CPTII,S$GLB,, | Performed by: STUDENT IN AN ORGANIZED HEALTH CARE EDUCATION/TRAINING PROGRAM

## 2023-11-30 PROCEDURE — 1159F MED LIST DOCD IN RCRD: CPT | Mod: CPTII,S$GLB,, | Performed by: STUDENT IN AN ORGANIZED HEALTH CARE EDUCATION/TRAINING PROGRAM

## 2023-11-30 PROCEDURE — 99204 OFFICE O/P NEW MOD 45 MIN: CPT | Mod: S$GLB,,, | Performed by: STUDENT IN AN ORGANIZED HEALTH CARE EDUCATION/TRAINING PROGRAM

## 2023-11-30 PROCEDURE — 3078F DIAST BP <80 MM HG: CPT | Mod: CPTII,S$GLB,, | Performed by: STUDENT IN AN ORGANIZED HEALTH CARE EDUCATION/TRAINING PROGRAM

## 2023-11-30 PROCEDURE — 99204 PR OFFICE/OUTPT VISIT, NEW, LEVL IV, 45-59 MIN: ICD-10-PCS | Mod: S$GLB,,, | Performed by: STUDENT IN AN ORGANIZED HEALTH CARE EDUCATION/TRAINING PROGRAM

## 2023-11-30 PROCEDURE — 4010F ACE/ARB THERAPY RXD/TAKEN: CPT | Mod: CPTII,S$GLB,, | Performed by: STUDENT IN AN ORGANIZED HEALTH CARE EDUCATION/TRAINING PROGRAM

## 2023-11-30 PROCEDURE — 4010F PR ACE/ARB THEARPY RXD/TAKEN: ICD-10-PCS | Mod: CPTII,S$GLB,, | Performed by: STUDENT IN AN ORGANIZED HEALTH CARE EDUCATION/TRAINING PROGRAM

## 2023-11-30 NOTE — PROGRESS NOTES
Office Note    No, Primary Doctor      First Office Visit: 11/30/23  Today' Date: 11/30/2023  Last Office Visit: None    Chief complaint: mid back pain     HPI: Pt is a pleasent 58 y.o., who presents for evaluation. Referred by self. Pt complains of mid back pain and R shoulder pain for 4 mos. Back pain is in his mid back around the last ribcage. States it is worse with standing. Had isolated L foot numbness on the side of his foot 3 wks ago that went away. Has L leg weakness and states it has been ongoing since his 4-vessel CABG. R shoulder pain is worse with movement. No BB changes. Has not tried PT and is open to trying. Doing HEP.     Pain score: 8  Pain disability score: 58      Relevant Imaging/ Testing:   XR R toe 8/23  XR R foot 5/21    Procedures: None    Date of board of pharmacy review:11/30/2023  Date of opioid risk screening/ pain psych: None  Date of opioid agreement and consent: None  Date of urine drug screen: None  Date of random pill count: None     was reviewed today: reviewed, hx of opiate use     Prescribed medications: None    See EHR for  PMH, PSH, FH, SH, Medications and Allergy    ROS:  Positive for pain  ROS     PE:  There were no vitals filed for this visit.  General: Pleasant, no distress  HEENT: NC/ AT. PERRLA  CV: Radial pulses intact  Pulm: No distress  Ext: No edema    Physical Exam     Neuromusculoskeletal:  Head: NC, AT. PERRLA  Neck: Intact range of motions  Shoulder: Intact range of motion. Min Bicep groove tenderness. 5/5 Strength. Pos Levine on R, no pain with external rotation of R shoulder  Thoracic: Min tenderness, no step off, pain with extension, pain with rotation   Lumbar: Intact range of motion. Neg Facet loading. Min Tenderness. Neg SL   Hip: Intact range of motion  SI: Level  Knee: Intact range of motion  Reflexes: normal Knee  Strength: 4/5 dorsiflexion of L foot, 4/5 L leg flexion and extension   Sensory: Grossly intact   Skin: No bruising, erythema  Gait:  Normal      Impression:  Mid back pain  R shoulder pain   L leg weakness - ongoing since CABG   Relevant History  CAD  CABG  CHF         Plan:  Discussed options  Imaging/ relevant records viewed/ reviewed/ discussed  Imaging results viewed and reviewed (noted above)/ reviewed with patient   reviewed  PT trial  Cont HEP  XR T-spine  XR R shoulder  Re-eval after  MR T-spine if back pain persists  Consider MR R shoulder  Consider R shoulder injection       Prescribed medications:  1. None    The impression and plan were discussed and explained in detail. All the questions were answered. Education was provided accordingly.         Follow-up:  6 wks or sooner if needed    Smiley Encarnacion MD

## 2023-12-14 ENCOUNTER — CLINICAL SUPPORT (OUTPATIENT)
Dept: REHABILITATION | Facility: HOSPITAL | Age: 58
End: 2023-12-14
Payer: COMMERCIAL

## 2023-12-14 DIAGNOSIS — Z74.09 IMPAIRED MOBILITY AND ACTIVITIES OF DAILY LIVING: ICD-10-CM

## 2023-12-14 DIAGNOSIS — R29.3 STOOPED POSTURE: Primary | ICD-10-CM

## 2023-12-14 DIAGNOSIS — Z78.9 IMPAIRED MOBILITY AND ACTIVITIES OF DAILY LIVING: ICD-10-CM

## 2023-12-14 PROCEDURE — 97161 PT EVAL LOW COMPLEX 20 MIN: CPT | Mod: PO

## 2023-12-14 PROCEDURE — 97110 THERAPEUTIC EXERCISES: CPT | Mod: PO

## 2023-12-14 NOTE — PROGRESS NOTES
[facility]  Physical Therapy Initial Evaluation    Name: Irwin Richard  Clinic Number: [id number]    Therapy Diagnosis:   Encounter Diagnoses   Name Primary?    Stooped posture Yes    Impaired mobility and activities of daily living      Physician: Smiley Encarnacion MD    Physician Orders: PT Eval and Treat   Medical Diagnosis from Referral:   M54.9 (ICD-10-CM) - Back pain, unspecified back location, unspecified back pain laterality, unspecified chronicity   M25.511 (ICD-10-CM) - Right shoulder pain, unspecified chronicity   Evaluation Date: 12/14/2023  Authorization Period Expiration: 11/29/2024  Plan of Care Expiration: 3/7/2024 or 8v post eval  Visit # (episodes)/ Visits authorized: 1/ eval (POC 0 / 8)  2nd FOTO visit 5  3rd FOTO visit 10  Progress Note Due: 1/14/2024    Time In: 400  Time Out: 444  Total Billable Time: 44 minutes    Precautions: CAD; HTN  Insurance: Payor: [employer] / Plan: [facility] CHOICE PLUS / Product Type: Commercial /     Subjective   Date of onset: 2 mths ago for Right shoulder; 2 mths ago for back  History of current condition - Irwin reports: no known incident; gradual onset of pain in his back. Right arm pain just shows up and starts hurting; has to prop his arm to stop hurting; props when sleeping. No history of pain in neck, shoulder or back in the past. He went to [facility] and they sent him here. Has had a shooting pain up neck when shoulder hurts.      Medical History:   Past Medical History:   Diagnosis Date    Coronary artery disease     COVID-19     Gout     Hyperlipidemia     Hypertension        Surgical History:   Irwin Richard  has a past surgical history that includes Coronary artery bypass graft and Coronary angiography including bypass grafts with catheterization of left heart (N/A, 9/8/2022).    Medications:   Irwin has a current medication list which includes the following prescription(s): aspirin, atorvastatin, atorvastatin, losartan,  losartan, metoprolol succinate, metoprolol succinate, and nitroglycerin, and the following Facility-Administered Medications: acetaminophen.    Allergies:   Review of patient's allergies indicates:  No Known Allergies     Imaging, Xray ordered; not taken    Prior Therapy: no  Social History: live with partner; family in area; 1-story dwelling  Current Smoker: no  Cancer Hx: none  Recent Weight Loss: none  Head Ach / Limb dominance: history of Headache / Right  Bowel or Bladder Issues: no  Falls in last 6 months: no  Occupation: FT ; at a desk  Prior Level of Function: independent ambulation  Current Level of Function: difficult/pain with dressing-bend forward; reach OH; mornings; back - side to side    Pain:  Current: 8-10/10 shoulder; 5-10/10 back  Location: Right shoulder: anterior and travels to elbow; Back: across lumbosacral and glutes  Description: Arm aches - no n/t; Back aches, shoot  Aggravating Factors: dressing-bend forward; reach OH; mornings; back - side to side  Easing Factors: Tylenol; 1 hour walk 2-3 x week;     Pts goals: find out what the pain is and to get rid of it    Objective     Posture / IC / ASIS / PSIS: Excessive hip External Rotation; mild kyphosis; severe FHP   Palpation: TTP Bilateral Upper Trapezius; pectoralis; right deltoid      Range of Motion/Strength:      CERVICAL AROM  In degrees Pain/Dysfunction with Movement   Flexion (45 norm) 50    Extension (75 norm) 25    Right side bending  (35 norm) 30 tight   Left side bending  (35 norm) 30 tight   Right rotation  (65 norm) 45    Left rotation  (65 norm) 60      Seated Thoracic Spine Screen & Observation:  Flex: excessive  Ext: limited    Shoulder AROM:  Flexion: WNL  Abduction: Left 180 / Right 110  External Rotation: WNL  Internal Rotation painful on Right; WNL      5/5 Bilateral GH joint and not painful  UE MMT:  5/5 norm Left Right   Shoulder Flexion 5/5 5/5   Shoulder Extension 5/5 5/5   Shoulder Abduction 5/5 5/5         Shoulder IR 5/5 5/5   Shoulder ER  @ 0* Abduction 5/5 5/5   Elbow Flexion  5/5 5/5   Elbow Extension 5/5 5/5   Mid Traps 3/5 3/5   Low Traps 2/5 2/5        Strength (in pounds, setting II one maximum trial):    Left Right    II 65 lbs 45 lbs    Normal  Average Values  Female Right Left Male: Right Left   20-29 66 lbs 62 lbs         94 lbs 86 lbs   30-39 68 lbs 64 lbs 90 lbs 82 lbs   40-49 64 lbs 62 lbs 80 lbs 74 lbs   50-59 62 lbs 57 lbs 72 lbs 65 lbs   60-69 53 lbs 51 lbs 63 lbs 56 lbs   70+ 44 lbs 42 lbs 54 lbs 48 lbs      Cervical musculature flexibility: (restriction: mild, moderate, severe grading)   Muscle Flexibility   Upper trapezius severe   pectoralis severe     Cervical Distraction: neg  Cervical Quadrant: neg    Core strength: 5/5 MMT: NT  LLD (leg length discrepancy): NT  Supine to Sit for innominate rotation: NT          TREATMENT   Treatment Time In: 430  Treatment Time Out: 444  Total Treatment time separate from Evaluation: 14 minutes    Irwin received therapeutic exercises to develop strength, ROM, and flexibility for 10 minutes including:  NMRE utilized to activate appropriate mm to improve posture, shoulder stabilization, smooth GH and scapula glide: 4 minutes     Possible Cervical Nerve and/or Shoulder impingement due to poor posture  LBP has not been evaluated    STAND:  Scap retract/depress  Right Only Upper Trapezius stretch //bars  T/s stretch, HBH  Doorway stretch, low V arms      Add NEXT:  Lumbar rotations  Open books  Bridge  Red Theraband External Rotation/Extension  Posterior Chain Stretch  Work ergonomics; desk work  Hip Flexor stretch  Lumbar     Home Exercises and Patient Education Provided    Education provided:   - daily HEP    Written Home Exercises Provided: yes.  Exercises were reviewed and Irwin was able to demonstrate them prior to the end of the session.  Irwin demonstrated good  understanding of the education provided.     See EMR under Patient Instructions for  exercises provided 12/14/2023.    Assessment   Irwin is a 58 y.o. male referred to outpatient Physical Therapy with a medical diagnosis of right shoulder and back pain. Pt presents with stooped posture deficits, possible cervical nerve and right shoulder impingement, decreased right shoulder abduction Range of Motion, decreased Upper Trapezius and pectoralis flexibility, decreased periscapular muscle strength, pain palpable in Bilateral Upper Trapezius 2and right deltoid, and impaired function.    Pt prognosis is Good.   Pt will benefit from skilled outpatient Physical Therapy to address the deficits stated above and in the chart below, provide pt/family education, and to maximize pt's level of independence.     Plan of care discussed with patient: Yes  Pt's spiritual, cultural and educational needs considered and patient is agreeable to the plan of care and goals as stated below:     Anticipated Barriers for therapy: posture      Medical Necessity is demonstrated by the following  History  Co-morbidities and personal factors that may impact the plan of care [] LOW: no personal factors / co-morbidities  [x] MODERATE: 1-2 personal factors / co-morbidities  [] HIGH: 3+ personal factors / co-morbidities    Moderate / High Support Documentation:   Co-morbidities affecting plan of care: CAD; HTN    Personal Factors:   coping style  lifestyle     Examination  Body Structures and Functions, activity limitations and participation restrictions that may impact the plan of care [] LOW: addressing 1-2 elements  [x] MODERATE: 3+ elements  [] HIGH: 4+ elements (please support below)    Moderate / High Support Documentation: posture; Range of Motion; strength; flexibility     Clinical Presentation [x] LOW: stable  [] MODERATE: Evolving  [] HIGH: Unstable     Decision Making/ Complexity Score: low          Goals:  Short Term GOALS: 4 weeks. Pt agrees with goals set.  1. Patient demonstrates compliance with HEP.   2. Patient  demonstrates independence with Postural Awareness while sitting and standing.   3. Patient demonstrates decreased pain level of 3/10 while performing daily activities.  4. Patient will reduce RUE radiculopathy frequency and intensity.    Long Term GOALS: 6 weeks. Pt agrees with goals set.  1. Patient demonstrates increased C/S AROM to improve tolerance to daily activities and driving.   3. Patient demonstrates increased periscapular muscles strength by 1/2 grade or greater to improve tolerance to home chores.  4. Patient demonstrates independence with body mechanics while working.  5. Patient demonstrates independence with HEP.   6. Patient will improve FOTO function score to </= 67% to show improvement in condition and functional mobility.        Plan   Plan of care Certification: 12/14/2023 to 3/7/2024.    Outpatient Physical Therapy 2 times weekly for 6 weeks to include the following interventions: Cervical/Lumbar Traction, Electrical Stimulation ,, Manual Therapy, Moist Heat/ Ice, Neuromuscular Re-ed, Patient Education, Self Care, Therapeutic Activities, Therapeutic Exercise, and Ultrasound.     Lissa Do, PT

## 2023-12-18 PROBLEM — R29.3 STOOPED POSTURE: Status: ACTIVE | Noted: 2023-12-18

## 2023-12-18 PROBLEM — Z74.09 IMPAIRED MOBILITY AND ACTIVITIES OF DAILY LIVING: Status: ACTIVE | Noted: 2023-12-18

## 2023-12-18 PROBLEM — Z78.9 IMPAIRED MOBILITY AND ACTIVITIES OF DAILY LIVING: Status: ACTIVE | Noted: 2023-12-18

## 2023-12-18 NOTE — PLAN OF CARE
[facility]  Physical Therapy Initial Evaluation    Name: Irwin Richard  Clinic Number: [id number]    Therapy Diagnosis:   Encounter Diagnoses   Name Primary?    Stooped posture Yes    Impaired mobility and activities of daily living      Physician: Smiley Encarnacion MD    Physician Orders: PT Eval and Treat   Medical Diagnosis from Referral:   M54.9 (ICD-10-CM) - Back pain, unspecified back location, unspecified back pain laterality, unspecified chronicity   M25.511 (ICD-10-CM) - Right shoulder pain, unspecified chronicity   Evaluation Date: 12/14/2023  Authorization Period Expiration: 11/29/2024  Plan of Care Expiration: 3/7/2024 or 8v post eval  Visit # (episodes)/ Visits authorized: 1/ eval (POC 0 / 8)  2nd FOTO visit 5  3rd FOTO visit 10  Progress Note Due: 1/14/2024    Time In: 400  Time Out: 444  Total Billable Time: 44 minutes    Precautions: CAD; HTN  Insurance: Payor: [employer] / Plan: [facility] CHOICE PLUS / Product Type: Commercial /     Subjective   Date of onset: 2 mths ago for Right shoulder; 2 mths ago for back  History of current condition - Irwin reports: no known incident; gradual onset of pain in his back. Right arm pain just shows up and starts hurting; has to prop his arm to stop hurting; props when sleeping. No history of pain in neck, shoulder or back in the past. He went to [facility] and they sent him here. Has had a shooting pain up neck when shoulder hurts.      Medical History:   Past Medical History:   Diagnosis Date    Coronary artery disease     COVID-19     Gout     Hyperlipidemia     Hypertension        Surgical History:   Irwin Richard  has a past surgical history that includes Coronary artery bypass graft and Coronary angiography including bypass grafts with catheterization of left heart (N/A, 9/8/2022).    Medications:   Irwin has a current medication list which includes the following prescription(s): aspirin, atorvastatin, atorvastatin, losartan,  losartan, metoprolol succinate, metoprolol succinate, and nitroglycerin, and the following Facility-Administered Medications: acetaminophen.    Allergies:   Review of patient's allergies indicates:  No Known Allergies     Imaging, Xray ordered; not taken    Prior Therapy: no  Social History: live with partner; family in area; 1-story dwelling  Current Smoker: no  Cancer Hx: none  Recent Weight Loss: none  Head Ach / Limb dominance: history of Headache / Right  Bowel or Bladder Issues: no  Falls in last 6 months: no  Occupation: FT ; at a desk  Prior Level of Function: independent ambulation  Current Level of Function: difficult/pain with dressing-bend forward; reach OH; mornings; back - side to side    Pain:  Current: 8-10/10 shoulder; 5-10/10 back  Location: Right shoulder: anterior and travels to elbow; Back: across lumbosacral and glutes  Description: Arm aches - no n/t; Back aches, shoot  Aggravating Factors: dressing-bend forward; reach OH; mornings; back - side to side  Easing Factors: Tylenol; 1 hour walk 2-3 x week;     Pts goals: find out what the pain is and to get rid of it    Objective     Posture / IC / ASIS / PSIS: Excessive hip External Rotation; mild kyphosis; severe FHP   Palpation: TTP Bilateral Upper Trapezius; pectoralis; right deltoid      Range of Motion/Strength:      CERVICAL AROM  In degrees Pain/Dysfunction with Movement   Flexion (45 norm) 50    Extension (75 norm) 25    Right side bending  (35 norm) 30 tight   Left side bending  (35 norm) 30 tight   Right rotation  (65 norm) 45    Left rotation  (65 norm) 60      Seated Thoracic Spine Screen & Observation:  Flex: excessive  Ext: limited    Shoulder AROM:  Flexion: WNL  Abduction: Left 180 / Right 110  External Rotation: WNL  Internal Rotation painful on Right; WNL      5/5 Bilateral GH joint and not painful  UE MMT:  5/5 norm Left Right   Shoulder Flexion 5/5 5/5   Shoulder Extension 5/5 5/5   Shoulder Abduction 5/5 5/5         Shoulder IR 5/5 5/5   Shoulder ER  @ 0* Abduction 5/5 5/5   Elbow Flexion  5/5 5/5   Elbow Extension 5/5 5/5   Mid Traps 3/5 3/5   Low Traps 2/5 2/5        Strength (in pounds, setting II one maximum trial):    Left Right    II 65 lbs 45 lbs    Normal  Average Values  Female Right Left Male: Right Left   20-29 66 lbs 62 lbs         94 lbs 86 lbs   30-39 68 lbs 64 lbs 90 lbs 82 lbs   40-49 64 lbs 62 lbs 80 lbs 74 lbs   50-59 62 lbs 57 lbs 72 lbs 65 lbs   60-69 53 lbs 51 lbs 63 lbs 56 lbs   70+ 44 lbs 42 lbs 54 lbs 48 lbs      Cervical musculature flexibility: (restriction: mild, moderate, severe grading)   Muscle Flexibility   Upper trapezius severe   pectoralis severe     Cervical Distraction: neg  Cervical Quadrant: neg    Core strength: 5/5 MMT: NT  LLD (leg length discrepancy): NT  Supine to Sit for innominate rotation: NT          TREATMENT   Treatment Time In: 430  Treatment Time Out: 444  Total Treatment time separate from Evaluation: 14 minutes    Irwin received therapeutic exercises to develop strength, ROM, and flexibility for 10 minutes including:  NMRE utilized to activate appropriate mm to improve posture, shoulder stabilization, smooth GH and scapula glide: 4 minutes     Possible Cervical Nerve and/or Shoulder impingement due to poor posture  LBP has not been evaluated    STAND:  Scap retract/depress  Right Only Upper Trapezius stretch //bars  T/s stretch, HBH  Doorway stretch, low V arms      Add NEXT:  Lumbar rotations  Open books  Bridge  Red Theraband External Rotation/Extension  Posterior Chain Stretch  Work ergonomics; desk work  Hip Flexor stretch  Lumbar     Home Exercises and Patient Education Provided    Education provided:   - daily HEP    Written Home Exercises Provided: yes.  Exercises were reviewed and Irwin was able to demonstrate them prior to the end of the session.  Irwin demonstrated good  understanding of the education provided.     See EMR under Patient Instructions for  exercises provided 12/14/2023.    Assessment   Irwin is a 58 y.o. male referred to outpatient Physical Therapy with a medical diagnosis of right shoulder and back pain. Pt presents with stooped posture deficits, possible cervical nerve and right shoulder impingement, decreased right shoulder abduction Range of Motion, decreased Upper Trapezius and pectoralis flexibility, decreased periscapular muscle strength, pain palpable in Bilateral Upper Trapezius 2and right deltoid, and impaired function.    Pt prognosis is Good.   Pt will benefit from skilled outpatient Physical Therapy to address the deficits stated above and in the chart below, provide pt/family education, and to maximize pt's level of independence.     Plan of care discussed with patient: Yes  Pt's spiritual, cultural and educational needs considered and patient is agreeable to the plan of care and goals as stated below:     Anticipated Barriers for therapy: posture      Medical Necessity is demonstrated by the following  History  Co-morbidities and personal factors that may impact the plan of care [] LOW: no personal factors / co-morbidities  [x] MODERATE: 1-2 personal factors / co-morbidities  [] HIGH: 3+ personal factors / co-morbidities    Moderate / High Support Documentation:   Co-morbidities affecting plan of care: CAD; HTN    Personal Factors:   coping style  lifestyle     Examination  Body Structures and Functions, activity limitations and participation restrictions that may impact the plan of care [] LOW: addressing 1-2 elements  [x] MODERATE: 3+ elements  [] HIGH: 4+ elements (please support below)    Moderate / High Support Documentation: posture; Range of Motion; strength; flexibility     Clinical Presentation [x] LOW: stable  [] MODERATE: Evolving  [] HIGH: Unstable     Decision Making/ Complexity Score: low          Goals:  Short Term GOALS: 4 weeks. Pt agrees with goals set.  1. Patient demonstrates compliance with HEP.   2. Patient  demonstrates independence with Postural Awareness while sitting and standing.   3. Patient demonstrates decreased pain level of 3/10 while performing daily activities.  4. Patient will reduce RUE radiculopathy frequency and intensity.    Long Term GOALS: 6 weeks. Pt agrees with goals set.  1. Patient demonstrates increased C/S AROM to improve tolerance to daily activities and driving.   3. Patient demonstrates increased periscapular muscles strength by 1/2 grade or greater to improve tolerance to home chores.  4. Patient demonstrates independence with body mechanics while working.  5. Patient demonstrates independence with HEP.   6. Patient will improve FOTO function score to </= 67% to show improvement in condition and functional mobility.        Plan   Plan of care Certification: 12/14/2023 to 3/7/2024.    Outpatient Physical Therapy 2 times weekly for 6 weeks to include the following interventions: Cervical/Lumbar Traction, Electrical Stimulation ,, Manual Therapy, Moist Heat/ Ice, Neuromuscular Re-ed, Patient Education, Self Care, Therapeutic Activities, Therapeutic Exercise, and Ultrasound.     Lissa Do, PT

## 2024-01-11 ENCOUNTER — CLINICAL SUPPORT (OUTPATIENT)
Dept: REHABILITATION | Facility: HOSPITAL | Age: 59
End: 2024-01-11
Payer: COMMERCIAL

## 2024-01-11 DIAGNOSIS — Z78.9 IMPAIRED MOBILITY AND ACTIVITIES OF DAILY LIVING: ICD-10-CM

## 2024-01-11 DIAGNOSIS — Z74.09 IMPAIRED MOBILITY AND ACTIVITIES OF DAILY LIVING: ICD-10-CM

## 2024-01-11 DIAGNOSIS — R29.3 STOOPED POSTURE: Primary | ICD-10-CM

## 2024-01-11 PROCEDURE — 97110 THERAPEUTIC EXERCISES: CPT | Mod: PO

## 2024-01-11 PROCEDURE — 97112 NEUROMUSCULAR REEDUCATION: CPT | Mod: PO

## 2024-01-11 PROCEDURE — 97140 MANUAL THERAPY 1/> REGIONS: CPT | Mod: PO

## 2024-01-11 NOTE — PROGRESS NOTES
[facility]   Physical Therapy Treatment Note      Name: Irwin Richard  Clinic Number: [id number]    Therapy Diagnosis:   Encounter Diagnoses   Name Primary?    Stooped posture Yes    Impaired mobility and activities of daily living      Physician: Smiley Encarnacion MD    Visit Date: 1/11/2024    Physician Orders: PT Eval and Treat   Medical Diagnosis from Referral:   M54.9 (ICD-10-CM) - Back pain, unspecified back location, unspecified back pain laterality, unspecified chronicity   M25.511 (ICD-10-CM) - Right shoulder pain, unspecified chronicity   Evaluation Date: 12/14/2023  Authorization Period Expiration: 11/29/2024  Plan of Care Expiration: 3/7/2024 or 8v post eval  Visit # (episodes)/ Visits authorized: 2 / eval + 20 (POC 1 / 8)  2nd FOTO visit 4  3rd FOTO visit 8  Progress Note Due: 1/14/2024     Time In: 400  Time Out: 444  Total Billable Time: 44 minutes     Precautions: CAD; HTN  Insurance: Payor: [employer] / Plan: [facility] CHOICE PLUS / Product Type: Commercial /     Subjective     Pt reports: pain is very much in deltoid region.    He  was somewhat  compliant with home exercise program.  Response to previous treatment: positive  Functional change: none reported    Pain: up to 8/10  Location: Right shoulder: anterior and travels to elbow (deltoid today); Back: across lumbosacral and glutes     Objective      Objective Measures updated at progress report unless specified.     Treatment     Possible Cervical Nerve and/or Shoulder impingement due to poor posture  LBP has not been evaluated     Irwin received the treatments listed below:      Irwin received therapeutic exercises to develop strength, ROM, and flexibility for 14 minutes including:  NMRE utilized to activate appropriate mm to improve posture, shoulder stabilization, smooth GH and scapula glide: 22 minutes      SEATED:  Manual Hot Pack over Right Upper Trapezius - did not affect pt - D/c next  Richardugs, 2x10  T/s  stretch, HBH, [id number]  Scap retract/depress, [id number] (Bilateral External Rotation was pnful in eval)    STAND:  Lime Theraband Extension, 30x  Lime band Isometric External Rotation, 20x Bilateral    Blueberry Band Internal Rotation, 20x Right    Right Only Upper Trapezius stretch //bars, [id number]  Doorway stretch, low V arms, [id number]  Posterior Chain Stretch @ //bars, [id number]    TABLE:  Sidelying horiz add stretch, [id number] (hep)    Manual tx: 8   Distraction of Right Upper Extremity    Cervical lateral glides  Shoulder mobs: Inf and Post glides / Gr 1-2 / 3x10       Add NEXT:  Work ergonomics; desk work  Lumbar   Cervical traction if not improved by 4th visit  Lumbar rotations  Open books  Bridge  Hip Flexor stretch    Patient Education and Home Exercises       Education provided:   - daily HEP  - utilizing heat in mornings, 10-20 minute max; ice if flare-up, 10-20 minute max     Written Home Exercises Provided: yes. Exercises were reviewed and Irwin was able to demonstrate them prior to the end of the session.  Irwin demonstrated good  understanding of the education provided. See EMR under Patient Instructions for exercises provided during therapy sessions    Assessment     Pt demonstrates variable high levels of pain, decreased right shoulder and cervical Range of Motion and kyphotic posture. Pt able to perform all therex given with minimal pn provocation. Focus on posture and periscapular strengthening. Given posterior deltoid stretch for HEP. Continue to progress as tolerated.     Irwin Is progressing well towards his goals.   Pt prognosis is Good.     Pt will continue to benefit from skilled outpatient physical therapy to address the deficits listed in the problem list box on initial evaluation, provide pt/family education and to maximize pt's level of independence in the home and community environment.     Pt's spiritual, cultural and educational needs considered and pt agreeable to plan of care and goals.      Anticipated barriers to physical therapy: posture    Goals:   Short Term GOALS: 4 weeks. Pt agrees with goals set.  1. Patient demonstrates compliance with HEP.   2. Patient demonstrates independence with Postural Awareness while sitting and standing.   3. Patient demonstrates decreased pain level of 3/10 while performing daily activities.  4. Patient will reduce RUE radiculopathy frequency and intensity.     Long Term GOALS: 6 weeks. Pt agrees with goals set.  1. Patient demonstrates increased C/S AROM to improve tolerance to daily activities and driving.   3. Patient demonstrates increased periscapular muscles strength by 1/2 grade or greater to improve tolerance to home chores.  4. Patient demonstrates independence with body mechanics while working.  5. Patient demonstrates independence with HEP.   6. Patient will improve FOTO function score to </= 67% to show improvement in condition and functional mobility.        Plan     Continue PT as deemed per POC:  posture, strength, cervical mobility    Lissa Do, PT

## 2024-01-12 ENCOUNTER — TELEPHONE (OUTPATIENT)
Dept: PAIN MEDICINE | Facility: CLINIC | Age: 59
End: 2024-01-12
Payer: COMMERCIAL

## 2024-01-12 NOTE — TELEPHONE ENCOUNTER
----- Message from Nunu Vences LPN sent at 1/11/2024  3:56 PM CST -----  Regarding: FW: Appointment Reschedule Request  Contact: patient at [phone]    ----- Message -----  From: Marta Joiner  Sent: 1/11/2024   3:54 PM CST  To: [facility] Schedulers  Subject: Appointment Reschedule Request                   Type:  Appointment Reschedule Request    Name of Caller:  patient at [phone]    Additional Information:  Needs to reschedule appointment missed today. Please call and advise. Thank you

## 2024-01-18 ENCOUNTER — CLINICAL SUPPORT (OUTPATIENT)
Dept: REHABILITATION | Facility: HOSPITAL | Age: 59
End: 2024-01-18
Payer: COMMERCIAL

## 2024-01-18 DIAGNOSIS — Z74.09 IMPAIRED MOBILITY AND ACTIVITIES OF DAILY LIVING: ICD-10-CM

## 2024-01-18 DIAGNOSIS — Z78.9 IMPAIRED MOBILITY AND ACTIVITIES OF DAILY LIVING: ICD-10-CM

## 2024-01-18 DIAGNOSIS — R29.3 STOOPED POSTURE: Primary | ICD-10-CM

## 2024-01-18 PROCEDURE — 97112 NEUROMUSCULAR REEDUCATION: CPT | Mod: PO

## 2024-01-18 PROCEDURE — 97110 THERAPEUTIC EXERCISES: CPT | Mod: PO

## 2024-01-18 NOTE — PROGRESS NOTES
[facility]   Physical Therapy Treatment Note      Name: Irwin Richard  Clinic Number: [id number]    Therapy Diagnosis:   Encounter Diagnoses   Name Primary?    Stooped posture Yes    Impaired mobility and activities of daily living      Physician: Smiley Encarnacion MD    Visit Date: 1/18/2024    Physician Orders: PT Eval and Treat   Medical Diagnosis from Referral:   M54.9 (ICD-10-CM) - Back pain, unspecified back location, unspecified back pain laterality, unspecified chronicity   M25.511 (ICD-10-CM) - Right shoulder pain, unspecified chronicity   Evaluation Date: 12/14/2023  Authorization Period Expiration: 11/29/2024  Plan of Care Expiration: 3/7/2024 or 8v post eval  Visit # (episodes)/ Visits authorized: 3 / eval + 20 (POC 2 / 8)  2nd FOTO visit 4  3rd FOTO visit 8  Progress Note Due: 1/14/2024     Time In: 355  Time Out: 440  Total Billable Time: 45 minutes     Precautions: CAD; HTN  Insurance: Payor: [employer] / Plan: [facility] CHOICE PLUS / Product Type: Commercial /     Subjective     Pt reports: pain is still in deltoid region and traveling into forearm. His back is still hurting him 9/10.     He  was somewhat  compliant with home exercise program.  Response to previous treatment: positive  Functional change: none reported    Pain: up to 8/10  Location: Right shoulder: anterior and travels to elbow (deltoid today); Back: across lumbosacral and glutes     Objective      Objective Measures updated at progress report unless specified.     Treatment     Possible Cervical Nerve and/or Shoulder impingement due to poor posture  LBP has not been evaluated     Irwin received the treatments listed below:      Irwin received therapeutic exercises to develop strength, ROM, and flexibility for 15 minutes including:  NMRE utilized to activate appropriate mm to improve posture, shoulder stabilization, smooth GH and scapula glide: 30 minutes     HOLD what he knows next visit for  time    SEATED:  UBE, Level 1 - NOT PERFORMED   Manual Hot Pack over Right Upper Trapezius - did not affect pt - D/C  Shrugs, 2x10  T/s stretch, HBH, 52m9htb  Bilateral External Rotation, 20a2uri (no resist, was pnful in eval)  Forearm stretches, 2 stretches, 4c66oyr    STAND:  Lime Theraband Low bar Extension, 30x  Lime Theraband Rows, 30x  Lime band Isometric External Rotation, 30x Bilateral    Lime Band Active Internal Rotation, 30x Right      Right Only Upper Trapezius stretch @ elliptical, 6o39tfb  Doorway stretch, low V arms, 0e17lti  Posterior Chain Stretch @ Elliptical, 4q39lwe  Pec stretch, extend elbow, 120 degrees, 7t30use Bilateral     Shoulder set/depress, hands on table - NO - Protract/Retract, wall, 20x    Flx Iso with ball, 90 deg, 98c4lst  Abd Iso, elbow extended, 28d6oee    TABLE:  Sidelying horiz add stretch, 7b09fjl (hep)  Lumbar rotations, 10x Bilateral   Open books, 5x Bilateral   Add Hip Flexor stretch  Add Bridge      Manual tx: 0 minutes NOT PERFORMED    Distraction of Right Upper Extremity    Cervical lateral glides  Shoulder mobs: Inf and Post glides / Gr 1-2 / 3x10       Add NEXT:  Work ergonomics; desk work  Lumbar eval  Cervical traction if not improved by 4th visit    Patient Education and Home Exercises       Education provided:   - daily HEP  - utilizing heat in mornings, 10-20 minute max; ice if flare-up, 10-20 minute max     Written Home Exercises Provided: yes. Exercises were reviewed and Irwin was able to demonstrate them prior to the end of the session.  Irwin demonstrated good  understanding of the education provided. See EMR under Patient Instructions for exercises provided during therapy sessions    Assessment     Pt demonstrates variable high levels of pain, decreased right shoulder and cervical Range of Motion and kyphotic posture. Pt able to perform all therex given with minimal pn provocation. Added full spine mobility exercises; pt reports more pain. Given posterior  deltoid stretch for HEP. Continue to progress as tolerated.     Irwin Is progressing well towards his goals.   Pt prognosis is Good.     Pt will continue to benefit from skilled outpatient physical therapy to address the deficits listed in the problem list box on initial evaluation, provide pt/family education and to maximize pt's level of independence in the home and community environment.     Pt's spiritual, cultural and educational needs considered and pt agreeable to plan of care and goals.     Anticipated barriers to physical therapy: posture    Goals:   Short Term GOALS: 4 weeks. Pt agrees with goals set.  1. Patient demonstrates compliance with HEP.   2. Patient demonstrates independence with Postural Awareness while sitting and standing.   3. Patient demonstrates decreased pain level of 3/10 while performing daily activities.  4. Patient will reduce RUE radiculopathy frequency and intensity.     Long Term GOALS: 6 weeks. Pt agrees with goals set.  1. Patient demonstrates increased C/S AROM to improve tolerance to daily activities and driving.   3. Patient demonstrates increased periscapular muscles strength by 1/2 grade or greater to improve tolerance to home chores.  4. Patient demonstrates independence with body mechanics while working.  5. Patient demonstrates independence with HEP.   6. Patient will improve FOTO function score to </= 67% to show improvement in condition and functional mobility.        Plan     Continue PT as deemed per POC:  posture, strength, cervical mobility    Lissa Do, PT

## 2024-02-08 ENCOUNTER — HOSPITAL ENCOUNTER (EMERGENCY)
Facility: HOSPITAL | Age: 59
Discharge: HOME | End: 2024-02-08
Attending: EMERGENCY MEDICINE
Payer: COMMERCIAL

## 2024-02-08 VITALS
HEIGHT: 71 IN | HEART RATE: 59 BPM | SYSTOLIC BLOOD PRESSURE: 138 MMHG | BODY MASS INDEX: 24.5 KG/M2 | WEIGHT: 175 LBS | RESPIRATION RATE: 20 BRPM | OXYGEN SATURATION: 99 % | TEMPERATURE: 98 F | DIASTOLIC BLOOD PRESSURE: 88 MMHG

## 2024-02-08 DIAGNOSIS — M1A.9XX1 GOUTY TOPHUS: Primary | ICD-10-CM

## 2024-02-08 PROCEDURE — 25000003 PHARM REV CODE 250: Performed by: NURSE PRACTITIONER

## 2024-02-08 PROCEDURE — 99284 EMERGENCY DEPT VISIT MOD MDM: CPT

## 2024-02-08 RX ORDER — HYDROCODONE BITARTRATE AND ACETAMINOPHEN 5; 325 MG/1; MG/1
1 TABLET ORAL EVERY 8 HOURS PRN
Qty: 10 TABLET | Refills: 0 | Status: SHIPPED | OUTPATIENT
Start: 2024-02-08 | End: 2024-02-11

## 2024-02-08 RX ORDER — PREDNISONE 20 MG/1
20 TABLET ORAL DAILY
Qty: 12 TABLET | Refills: 0 | Status: SHIPPED | OUTPATIENT
Start: 2024-02-08 | End: 2024-02-13

## 2024-02-08 RX ORDER — COLCHICINE 0.6 MG/1
0.6 TABLET ORAL ONCE
Qty: 1 TABLET | Refills: 0 | Status: SHIPPED | OUTPATIENT
Start: 2024-02-08 | End: 2024-03-20

## 2024-02-08 RX ORDER — COLCHICINE 0.6 MG/1
1.2 TABLET, FILM COATED ORAL
Status: COMPLETED | OUTPATIENT
Start: 2024-02-08 | End: 2024-02-08

## 2024-02-08 RX ADMIN — COLCHICINE 1.2 MG: 0.6 TABLET, FILM COATED ORAL at 10:02

## 2024-02-08 NOTE — ED PROVIDER NOTES
Encounter Date: 2/8/2024       History     Chief Complaint   Patient presents with    Foot Pain     Patient states he has gout in the left foot since this am      Patient is a 58 y.o. male who presents to the ED 02/08/2024 with a chief complaint of left great toe pain for 1 day consistent with previous episodes of gout.  Pt states he called his podiatrist but he cannot get an appointment for several weeks. He is not on his allopurinol currently as he is out. He states usually he gets 3 different medications that make him better. He denies any injury or trauma or fever. He denies any numbness or weakness. He has a history of CAD with CABG and takes ASA daily. He avoids NSAIDs at the direction of his cardiologist. Other PMH includes HTN and HLD.                Review of patient's allergies indicates:  No Known Allergies  Past Medical History:   Diagnosis Date    Coronary artery disease     COVID-19     Gout     Hyperlipidemia     Hypertension      Past Surgical History:   Procedure Laterality Date    CORONARY ANGIOGRAPHY INCLUDING BYPASS GRAFTS WITH CATHETERIZATION OF LEFT HEART N/A 9/8/2022    Procedure: ANGIOGRAM, CORONARY, INCLUDING BYPASS GRAFT, WITH LEFT HEART CATHETERIZATION;  Surgeon: Andrews Lopez MD;  Location: [facility] CATH/EP LAB;  Service: Cardiology;  Laterality: N/A;    CORONARY ARTERY BYPASS GRAFT       Family History   Problem Relation Age of Onset    Heart disease Father      Social History     Tobacco Use    Smoking status: Former    Smokeless tobacco: Never   Substance Use Topics    Alcohol use: Not Currently    Drug use: Never     Review of Systems   Constitutional:  Negative for chills and fever.   Respiratory:  Negative for chest tightness and shortness of breath.    Cardiovascular:  Negative for chest pain.   Genitourinary:  Negative for dysuria.   Musculoskeletal:  Positive for arthralgias and joint swelling. Negative for back pain, gait problem and myalgias.   Skin:  Negative for rash and wound.    Neurological:  Negative for syncope, weakness and numbness.   Hematological:  Does not bruise/bleed easily.       Physical Exam     Initial Vitals [02/08/24 0927]   BP Pulse Resp Temp SpO2   138/88 (!) 59 20 97.9 °F (36.6 °C) 99 %      MAP       --         Physical Exam    Nursing note and vitals reviewed.  Constitutional: He appears well-developed and well-nourished.   HENT:   Head: Normocephalic and atraumatic.   Eyes: Conjunctivae are normal. Pupils are equal, round, and reactive to light. Right eye exhibits no discharge. Left eye exhibits no discharge.   Neck: Neck supple.   Normal range of motion.  Cardiovascular:  Normal rate, regular rhythm, normal heart sounds and intact distal pulses.           Pulses:       Dorsalis pedis pulses are 2+ on the left side.        Posterior tibial pulses are 2+ on the left side.   Pulmonary/Chest: Breath sounds normal.   Musculoskeletal:         General: Normal range of motion.      Cervical back: Normal range of motion and neck supple.      Comments: Tenderness and swelling at left 1st MTP joint. No other swelling or tenderness or deformity to the foot.      Neurological: He is alert and oriented to person, place, and time. He has normal strength. No sensory deficit.   Skin: Skin is warm and dry. Capillary refill takes less than 2 seconds.   Psychiatric: He has a normal mood and affect.         ED Course   Procedures  Labs Reviewed - No data to display       Imaging Results    None          Medications   colchicine capsule/tablet 1.2 mg (1.2 mg Oral Given 2/8/24 1024)     Medical Decision Making  Risk  Prescription drug management.         APC / Resident Notes:   Patient is a 58 y.o. male who presents to the ED 02/08/2024 who underwent emergent evaluation for swelling and pain for 1 day without injury or trauma to left foot at 1st MTP joint. No erythema. Skin intact. He denies trauma. I doubt acute fracture. I do no think any acute infectious process such as cellulitis or  septic joint. He is ambulatory and has + 2 DP and PT pulses. This is likely an acute flair up of his gout. I do not think further imaging or other studies indicated at this time. Discussed pain management and multiple medications and pt agrees to colchicine, prednisone, and short course of pain medication for home today and will follow up with podiatry. Based on my clinical evaluation, I do not appreciate any immediate, emergent, or life threatening condition or etiology that warrants additional workup today and feel that the patient can be discharged with close follow up care. Case discussed with Dr. Barnes who is agreeable to plan of care. Follow up and return precautions discussed; patient verbalized understanding and is agreeable to plan of care. Patient discharged home in stable condition.                      Medical Decision Making:   Differential Diagnosis:   GOUT   Cellulitis  Septic joint             Clinical Impression:  Final diagnoses:  [M1A.9XX1] Gouty terra (Primary)          ED Disposition Condition    Discharge Stable          ED Prescriptions       Medication Sig Dispense Start Date End Date Auth. Provider    HYDROcodone-acetaminophen (NORCO) 5-325 mg per tablet Take 1 tablet by mouth every 8 (eight) hours as needed for Pain. 10 tablet 2/8/2024 2/11/2024 Aretha Hunter NP    colchicine (COLCRYS) 0.6 mg tablet (Expires today) Take 1 tablet (0.6 mg total) by mouth once. for 1 dose 1 tablet 2/8/2024 2/8/2024 Aretha Hunter NP    predniSONE (DELTASONE) 20 MG tablet Take 1 tablet (20 mg total) by mouth once daily. Take 3 tablets daily on day #1-2, Take 2 tablets daily on days #3-4, Take 1 tablet daily on day #5-6, then stop taking for 5 days 12 tablet 2/8/2024 2/13/2024 Aretha Hunter NP          Follow-up Information       Follow up With Specialties Details Why Contact Info    Sanjiv Blue DPM Podiatry, Surgery, Wound Care   [address]  [address]  [facility] [address]  [phone]                Aretha Hunter, HARMAN  02/08/24 1146

## 2024-03-07 ENCOUNTER — HOSPITAL ENCOUNTER (EMERGENCY)
Facility: HOSPITAL | Age: 59
Discharge: HOME | End: 2024-03-07
Attending: EMERGENCY MEDICINE
Payer: COMMERCIAL

## 2024-03-07 VITALS
RESPIRATION RATE: 17 BRPM | SYSTOLIC BLOOD PRESSURE: 130 MMHG | OXYGEN SATURATION: 97 % | WEIGHT: 181 LBS | DIASTOLIC BLOOD PRESSURE: 80 MMHG | TEMPERATURE: 98 F | BODY MASS INDEX: 25.24 KG/M2 | HEART RATE: 64 BPM

## 2024-03-07 DIAGNOSIS — M25.511 RIGHT SHOULDER PAIN: ICD-10-CM

## 2024-03-07 PROCEDURE — 25000003 PHARM REV CODE 250: Performed by: NURSE PRACTITIONER

## 2024-03-07 PROCEDURE — 99283 EMERGENCY DEPT VISIT LOW MDM: CPT | Mod: 25

## 2024-03-07 RX ORDER — DICLOFENAC SODIUM 10 MG/G
2 GEL TOPICAL 4 TIMES DAILY
Qty: 350 G | Refills: 0 | Status: SHIPPED | OUTPATIENT
Start: 2024-03-07

## 2024-03-07 RX ORDER — NAPROXEN 500 MG/1
500 TABLET ORAL 2 TIMES DAILY WITH MEALS
Qty: 20 TABLET | Refills: 0 | Status: SHIPPED | OUTPATIENT
Start: 2024-03-07 | End: 2024-03-17

## 2024-03-07 RX ORDER — KETOROLAC TROMETHAMINE 10 MG/1
10 TABLET, FILM COATED ORAL
Status: COMPLETED | OUTPATIENT
Start: 2024-03-07 | End: 2024-03-07

## 2024-03-07 RX ADMIN — KETOROLAC TROMETHAMINE 10 MG: 10 TABLET, FILM COATED ORAL at 07:03

## 2024-03-08 NOTE — FIRST PROVIDER EVALUATION
Emergency Department TeleTriage Encounter Note      CHIEF COMPLAINT    Chief Complaint   Patient presents with    Shoulder Pain     Patient states he has been having right shoulder pain for months.   States he is doing therapy, but no imaging has been done.   States can not lift arm without pain and hearing the grinding.        VITAL SIGNS   Initial Vitals [03/07/24 1841]   BP Pulse Resp Temp SpO2   132/81 66 20 97.9 °F (36.6 °C) 97 %      MAP       --            ALLERGIES    Review of patient's allergies indicates:  No Known Allergies    PROVIDER TRIAGE NOTE  Patient presents with right shoulder pain for months. He states he has been going to PT but has never had xray or any imaging.       ORDERS  Labs Reviewed - No data to display    ED Orders (720h ago, onward)      None              Virtual Visit Note: The provider triage portion of this emergency department evaluation and documentation was performed via [employer], a HIPAA-compliant telemedicine application, in concert with a tele-presenter in the room. A face to face patient evaluation with one of my colleagues will occur once the patient is placed in an emergency department room.      DISCLAIMER: This note was prepared with [employer] voice recognition transcription software. Garbled syntax, mangled pronouns, and other bizarre constructions may be attributed to that software system.

## 2024-03-08 NOTE — ED PROVIDER NOTES
Likely Encounter Date: 3/7/2024       History     Chief Complaint   Patient presents with    Shoulder Pain     Patient states he has been having right shoulder pain for months.   States he is doing therapy, but no imaging has been done.   States can not lift arm without pain and hearing the grinding.      Patient is a 58 y.o. male who presents to the ED 03/07/2024 with a chief complaint of Right shoulder pain for months.  He has been going to physical therapy for this without improvement.  He denies any injury or trauma.  He denies any redness of the joint or fever.  He saw some doctor for this he states but he has not sure if it was an orthopedic or who it was.  They sent him to physical therapy.  He is tried many topical treatments without relief as well as Tylenol.  He has a history of CAD, gout, hyperlipidemia, and hypertension.               Review of patient's allergies indicates:  No Known Allergies  Past Medical History:   Diagnosis Date    Coronary artery disease     COVID-19     Gout     Hyperlipidemia     Hypertension      Past Surgical History:   Procedure Laterality Date    CORONARY ANGIOGRAPHY INCLUDING BYPASS GRAFTS WITH CATHETERIZATION OF LEFT HEART N/A 9/8/2022    Procedure: ANGIOGRAM, CORONARY, INCLUDING BYPASS GRAFT, WITH LEFT HEART CATHETERIZATION;  Surgeon: Andrews Lopez MD;  Location: [facility] CATH/EP LAB;  Service: Cardiology;  Laterality: N/A;    CORONARY ARTERY BYPASS GRAFT       Family History   Problem Relation Age of Onset    Heart disease Father      Social History     Tobacco Use    Smoking status: Former    Smokeless tobacco: Never   Substance Use Topics    Alcohol use: Not Currently    Drug use: Never     Review of Systems   Constitutional:  Negative for chills and fever.   HENT:  Negative for sore throat.    Respiratory:  Negative for chest tightness and shortness of breath.    Cardiovascular:  Negative for chest pain.   Gastrointestinal:  Negative for abdominal pain.   Genitourinary:   Negative for dysuria.   Musculoskeletal:  Positive for arthralgias. Negative for back pain, gait problem, joint swelling, myalgias, neck pain and neck stiffness.   Skin:  Negative for rash and wound.   Neurological:  Negative for syncope.   Hematological:  Does not bruise/bleed easily.       Physical Exam     Initial Vitals [03/07/24 1841]   BP Pulse Resp Temp SpO2   132/81 66 20 97.9 °F (36.6 °C) 97 %      MAP       --         Physical Exam    Nursing note and vitals reviewed.  Constitutional: He appears well-developed and well-nourished.   HENT:   Head: Normocephalic and atraumatic.   Eyes: Conjunctivae are normal. Pupils are equal, round, and reactive to light. Right eye exhibits no discharge. Left eye exhibits no discharge.   Neck: Neck supple.   Normal range of motion.  Cardiovascular:  Normal rate, regular rhythm, normal heart sounds and intact distal pulses.           Pulses:       Radial pulses are 2+ on the right side.   Pulmonary/Chest: Breath sounds normal.   Musculoskeletal:      Right shoulder: Tenderness present. No swelling, deformity, effusion, laceration, bony tenderness or crepitus. Decreased range of motion. Normal strength. Normal pulse.      Right upper arm: Normal.      Right elbow: Normal.      Cervical back: Normal range of motion and neck supple.      Comments: Right anterior shoulder tenderness over the joint line. 90° of abduction.  Normal internal rotation.  100 degrees of forward flexion     Neurological: He is alert and oriented to person, place, and time. He has normal strength. No sensory deficit.   Skin: Skin is warm and dry. Capillary refill takes less than 2 seconds.   Psychiatric: He has a normal mood and affect.         ED Course   Procedures  Labs Reviewed - No data to display       Imaging Results              X-Ray Shoulder Complete 2 View Right (In process)                      Medications   ketorolac tablet 10 mg (10 mg Oral Given 3/7/24 1958)     Medical Decision  Making  Risk  Prescription drug management.         APC / Resident Notes:   Patient is a 58 y.o. male who presents to the ED 03/07/2024 who underwent emergent evaluation for Chronic right shoulder pain.  No injury or trauma.  X-ray without evidence of acute fracture dislocation.  Do not think any acute fracture dislocation.  The shoulder joint is not warm.  There is no redness.  Skin intact.  No evidence of acute infectious process such as septic joint.  Likely arthritis.  I do not think frozen shoulder.  Discussed possible rotator cuff or ligament injury.  Patient however has had no recent trauma and this appears to be chronic stable.  Recommended anti-inflammatories and given referral to Orthopedics.  I do not think further immobilization indicated at this time. Based on my clinical evaluation, I do not appreciate any immediate, emergent, or life threatening condition or etiology that warrants additional workup today and feel that the patient can be discharged with close follow up care. Case discussed with Dr. Barnes who is agreeable to plan of care. Follow up and return precautions discussed; patient verbalized understanding and is agreeable to plan of care. Patient discharged home in stable condition.                      Medical Decision Making:   Differential Diagnosis:   Tendonitis   Arthritis  Frozen shoulder  Fx  dislocation             Clinical Impression:  Final diagnoses:  [M25.511] Right shoulder pain          ED Disposition Condition    Discharge Stable          ED Prescriptions       Medication Sig Dispense Start Date End Date Auth. Provider    diclofenac sodium (VOLTAREN) 1 % Gel Apply 2 g topically 4 (four) times daily. 350 g 3/7/2024 -- Aretha Hunter NP    naproxen (NAPROSYN) 500 MG tablet Take 1 tablet (500 mg total) by mouth 2 (two) times daily with meals. for 10 days 20 tablet 3/7/2024 3/17/2024 Aretha Hunter NP          Follow-up Information       Follow up With Specialties Details Why  Contact Info Additional Information    Buck Benz MD Sports Medicine, Orthopedic Surgery In 1 week  [address] [place] [address] [address] Roe [facility]  [place] [address]  [phone]       [place] [facility] -  Emergency Medicine  As needed, If symptoms worsen [address] [place] [address] Dr [place] [address] 1st floor             Aretha Hunter NP  03/07/24 2020

## 2024-03-20 ENCOUNTER — OFFICE VISIT (OUTPATIENT)
Dept: ORTHOPEDICS | Facility: CLINIC | Age: 59
End: 2024-03-20
Payer: COMMERCIAL

## 2024-03-20 VITALS — WEIGHT: 181 LBS | BODY MASS INDEX: 25.34 KG/M2 | RESPIRATION RATE: 18 BRPM | HEIGHT: 71 IN

## 2024-03-20 DIAGNOSIS — M75.21 BICEPS TENDINITIS, RIGHT: Primary | ICD-10-CM

## 2024-03-20 DIAGNOSIS — M25.511 RIGHT SHOULDER PAIN: ICD-10-CM

## 2024-03-20 PROCEDURE — 99203 OFFICE O/P NEW LOW 30 MIN: CPT | Mod: 25,S$GLB,, | Performed by: ORTHOPAEDIC SURGERY

## 2024-03-20 PROCEDURE — 3008F BODY MASS INDEX DOCD: CPT | Mod: CPTII,S$GLB,, | Performed by: ORTHOPAEDIC SURGERY

## 2024-03-20 PROCEDURE — 20550 NJX 1 TENDON SHEATH/LIGAMENT: CPT | Mod: RT,S$GLB,, | Performed by: ORTHOPAEDIC SURGERY

## 2024-03-20 PROCEDURE — 1160F RVW MEDS BY RX/DR IN RCRD: CPT | Mod: CPTII,S$GLB,, | Performed by: ORTHOPAEDIC SURGERY

## 2024-03-20 PROCEDURE — 4010F ACE/ARB THERAPY RXD/TAKEN: CPT | Mod: CPTII,S$GLB,, | Performed by: ORTHOPAEDIC SURGERY

## 2024-03-20 PROCEDURE — 99999 PR PBB SHADOW E&M-EST. PATIENT-LVL III: CPT | Mod: PBBFAC,,, | Performed by: ORTHOPAEDIC SURGERY

## 2024-03-20 PROCEDURE — 1159F MED LIST DOCD IN RCRD: CPT | Mod: CPTII,S$GLB,, | Performed by: ORTHOPAEDIC SURGERY

## 2024-03-20 RX ORDER — TRIAMCINOLONE ACETONIDE 40 MG/ML
40 INJECTION, SUSPENSION INTRA-ARTICULAR; INTRAMUSCULAR
Status: DISCONTINUED | OUTPATIENT
Start: 2024-03-20 | End: 2024-03-20 | Stop reason: HOSPADM

## 2024-03-20 RX ADMIN — TRIAMCINOLONE ACETONIDE 40 MG: 40 INJECTION, SUSPENSION INTRA-ARTICULAR; INTRAMUSCULAR at 03:03

## 2024-03-20 NOTE — PROGRESS NOTES
Past Medical History:   Diagnosis Date    Coronary artery disease     COVID-19     Gout     Hyperlipidemia     Hypertension        Past Surgical History:   Procedure Laterality Date    CORONARY ANGIOGRAPHY INCLUDING BYPASS GRAFTS WITH CATHETERIZATION OF LEFT HEART N/A 9/8/2022    Procedure: ANGIOGRAM, CORONARY, INCLUDING BYPASS GRAFT, WITH LEFT HEART CATHETERIZATION;  Surgeon: Andrews Lopez MD;  Location: [facility] CATH/EP LAB;  Service: Cardiology;  Laterality: N/A;    CORONARY ARTERY BYPASS GRAFT         Current Outpatient Medications   Medication Sig    atorvastatin (LIPITOR) 80 MG tablet Take 1 tablet (80 mg total) by mouth once daily.    atorvastatin (LIPITOR) 80 MG tablet Take 1 tablet (80 mg total) by mouth every evening.    diclofenac sodium (VOLTAREN) 1 % Gel Apply 2 g topically 4 (four) times daily.    losartan (COZAAR) 25 MG tablet Take 1 tablet (25 mg total) by mouth once daily.    losartan (COZAAR) 25 MG tablet Take 1 tablet (25 mg total) by mouth once daily.    metoprolol succinate (TOPROL-XL) 100 MG 24 hr tablet Take 1 tablet (100 mg total) by mouth once daily.    metoprolol succinate (TOPROL-XL) 100 MG 24 hr tablet Take 1 tablet (100 mg total) by mouth once daily.    nitroGLYCERIN (NITROSTAT) 0.4 MG SL tablet Place 1 tablet (0.4 mg total) under the tongue every 5 (five) minutes as needed for Chest pain.    aspirin 81 MG Chew Take 1 tablet (81 mg total) by mouth once daily.    colchicine (COLCRYS) 0.6 mg tablet Take 1 tablet (0.6 mg total) by mouth once. for 1 dose     Current Facility-Administered Medications   Medication    acetaminophen tablet 650 mg       Review of patient's allergies indicates:  No Known Allergies    Family History   Problem Relation Age of Onset    Heart disease Father        Social History     Socioeconomic History    Marital status: Single   Tobacco Use    Smoking status: Former    Smokeless tobacco: Never   Substance and Sexual Activity    Alcohol use: Not Currently    Drug use:  Never    Sexual activity: Yes     Partners: Female     Birth control/protection: None     Social Determinants of Health     Financial Resource Strain: Medium Risk (3/20/2024)    Overall Financial Resource Strain (CARDIA)     Difficulty of Paying Living Expenses: Somewhat hard   Food Insecurity: Patient Declined (3/20/2024)    Hunger Vital Sign     Worried About Running Out of Food in the Last Year: Patient declined     Ran Out of Food in the Last Year: Patient declined   Transportation Needs: Patient Declined (3/20/2024)    PRAPARE - Transportation     Lack of Transportation (Medical): Patient declined     Lack of Transportation (Non-Medical): Patient declined   Physical Activity: Sufficiently Active (3/20/2024)    Exercise Vital Sign     Days of Exercise per Week: 3 days     Minutes of Exercise per Session: 60 min   Stress: No Stress Concern Present (3/20/2024)    [employer] - Occupational Stress Questionnaire     Feeling of Stress : Only a little   Social Connections: Unknown (3/20/2024)    Social Connection and Isolation Panel [NHANES]     Frequency of Communication with Friends and Family: Twice a week     Frequency of Social Gatherings with Friends and Family: Once a week     Active Member of Clubs or Organizations: Yes     Attends Club or Organization Meetings: More than 4 times per year     Marital Status: Living with partner   Housing Stability: Patient Declined (3/20/2024)    Housing Stability Vital Sign     Unable to Pay for Housing in the Last Year: Patient declined     Unstable Housing in the Last Year: Patient declined       Chief Complaint:   Chief Complaint   Patient presents with    Right Shoulder - Pain       History of present illness:  58-year-old male seen in consultation for Aretha Hunter.  Patient has had some right shoulder pain now for 2-3 months.  Patient denies an injury or trauma.  Pain is a 10/10.  Patient is right-hand dominant.  Pain is right over the front  of his shoulder.  Patient did physical therapy but it was more for his neck and they did not really do much specifically for the shoulder.        Review of Systems:    Constitution: Negative for chills, fever, and sweats.  Negative for unexplained weight loss.    HENT:  Negative for headaches and blurry vision.    Cardiovascular:Negative for chest pain or irregular heart beat. Negative for hypertension.    Respiratory:  Negative for cough and shortness of breath.    Gastrointestinal: Negative for abdominal pain, heartburn, melena, nausea, and vomitting.    Genitourinary:  Negative bladder incontinence and dysuria.    Musculoskeletal:  See HPI    Neurological: Negative for numbness.    Psychiatric/Behavioral: Negative for depression.  The patient is not nervous/anxious.      Endocrine: Negative for polyuria    Hematologic/Lymphatic: Negative for bleeding problem.  Does not bruise/bleed easily.    Skin: Negative for poor would healing and rash      Physical Examination:    Vital Signs:    Vitals:    03/20/24 1513   Resp: 18       Body mass index is 25.24 kg/m².    This a well-developed, well nourished patient in no acute distress.  They are alert and oriented and cooperative to examination.  Pt. walks without an antalgic gait.      Examination of the right shoulder shows no rashes or erythema. There are no masses, ecchymosis, or atrophy. The patient has Mild limitation in active of motion in forward flexion, external rotation, and internal rotation to the mid T-spine. The patient has positive Levine Neer and Cottonwood's test.  Positive tenderness over the biceps groove. Nontender to palpation over a.c. joint. Normal stability anteriorly, posteriorly, and negative sulcus sign. Passive range of motion: Forward flexion of 180°, external rotation at 90° of 90°, internal rotation of 50°, and external rotation at 0° of 50°. 2+ radial pulse. Intact axillary, radial, median and ulnar sensation. 5 out of 5 resisted forward  flexion, external rotation, and negative lift off test.      X-rays:  X-rays of the right shoulder are available for review which show no atypical findings         Assessment:  Right proximal biceps tendinitis    Plan:  I reviewed the findings with him today.  We talked about treatment options.  I recommended a biceps tendon injection as well as some self-directed exercises.  Hopefully this will help resolve his symptoms.  Next step would possibly be an MRI or physical therapy dedicated to his shoulder.            All previous pertinent notes including ER visits, physical therapy visits, other orthopedic visits as well as other care for the same musculoskeletal problem were reviewed.  All pertinent lab values and previous imaging was reviewed pertinent to the current visit.    This note was created using [employer] voice recognition software that occasionally misinterpreted phrases or words.    Consult note is delivered via Epic messaging service.

## 2024-03-20 NOTE — PROCEDURES
Tendon Sheath    Date/Time: 3/20/2024 3:15 PM    Performed by: Buck Benz MD  Authorized by: Buck Benz MD    Consent Done?:  Yes (Verbal)  Indications:  Pain  Site marked: the procedure site was marked    Timeout: prior to procedure the correct patient, procedure, and site was verified    Prep: patient was prepped and draped in usual sterile fashion      Local anesthesia used?: Yes    Anesthesia:  Local infiltration  Local anesthetic:  Lidocaine 1% without epinephrine  Anesthetic total (ml):  1    Location:  Shoulder  Site:  R bicep tendon  Ultrasonic guidance for needle placement?: No    Needle size:  22 G  Approach:  Volar  Medications:  40 mg triamcinolone acetonide 40 mg/mL  Patient tolerance:  Patient tolerated the procedure well with no immediate complications

## 2024-03-21 PROBLEM — Z78.9 IMPAIRED MOBILITY AND ACTIVITIES OF DAILY LIVING: Status: RESOLVED | Noted: 2023-12-18 | Resolved: 2024-03-21

## 2024-03-21 PROBLEM — Z74.09 IMPAIRED MOBILITY AND ACTIVITIES OF DAILY LIVING: Status: RESOLVED | Noted: 2023-12-18 | Resolved: 2024-03-21

## 2024-03-21 PROBLEM — R29.3 STOOPED POSTURE: Status: RESOLVED | Noted: 2023-12-18 | Resolved: 2024-03-21

## 2024-07-07 ENCOUNTER — HOSPITAL ENCOUNTER (EMERGENCY)
Facility: HOSPITAL | Age: 59
Discharge: HOME | End: 2024-07-07
Attending: EMERGENCY MEDICINE
Payer: COMMERCIAL

## 2024-07-07 VITALS
DIASTOLIC BLOOD PRESSURE: 87 MMHG | OXYGEN SATURATION: 99 % | WEIGHT: 175 LBS | SYSTOLIC BLOOD PRESSURE: 136 MMHG | HEIGHT: 70 IN | RESPIRATION RATE: 16 BRPM | TEMPERATURE: 98 F | BODY MASS INDEX: 25.05 KG/M2 | HEART RATE: 65 BPM

## 2024-07-07 DIAGNOSIS — S39.011A ABDOMINAL WALL STRAIN, INITIAL ENCOUNTER: Primary | ICD-10-CM

## 2024-07-07 DIAGNOSIS — M10.9 ACUTE GOUT INVOLVING TOE OF RIGHT FOOT, UNSPECIFIED CAUSE: ICD-10-CM

## 2024-07-07 DIAGNOSIS — S86.912A KNEE STRAIN, LEFT, INITIAL ENCOUNTER: ICD-10-CM

## 2024-07-07 PROCEDURE — 25000003 PHARM REV CODE 250: Performed by: EMERGENCY MEDICINE

## 2024-07-07 PROCEDURE — 99284 EMERGENCY DEPT VISIT MOD MDM: CPT

## 2024-07-07 RX ORDER — HYDROCODONE BITARTRATE AND ACETAMINOPHEN 5; 325 MG/1; MG/1
1 TABLET ORAL EVERY 6 HOURS PRN
Qty: 12 TABLET | Refills: 0 | Status: SHIPPED | OUTPATIENT
Start: 2024-07-07

## 2024-07-07 RX ORDER — NAPROXEN 500 MG/1
500 TABLET ORAL 2 TIMES DAILY WITH MEALS
Qty: 30 TABLET | Refills: 0 | Status: SHIPPED | OUTPATIENT
Start: 2024-07-07

## 2024-07-07 RX ORDER — HYDROCODONE BITARTRATE AND ACETAMINOPHEN 5; 325 MG/1; MG/1
1 TABLET ORAL
Status: COMPLETED | OUTPATIENT
Start: 2024-07-07 | End: 2024-07-07

## 2024-07-07 RX ORDER — COLCHICINE 0.6 MG/1
0.6 TABLET ORAL ONCE
Qty: 1 TABLET | Refills: 0 | Status: SHIPPED | OUTPATIENT
Start: 2024-07-07 | End: 2024-07-08

## 2024-07-07 RX ORDER — NAPROXEN 500 MG/1
500 TABLET ORAL
Status: COMPLETED | OUTPATIENT
Start: 2024-07-07 | End: 2024-07-07

## 2024-07-07 RX ORDER — COLCHICINE 0.6 MG/1
1.2 TABLET, FILM COATED ORAL
Status: COMPLETED | OUTPATIENT
Start: 2024-07-07 | End: 2024-07-07

## 2024-07-07 RX ADMIN — COLCHICINE 1.2 MG: 0.6 TABLET, FILM COATED ORAL at 12:07

## 2024-07-07 RX ADMIN — HYDROCODONE BITARTRATE AND ACETAMINOPHEN 1 TABLET: 5; 325 TABLET ORAL at 12:07

## 2024-07-07 RX ADMIN — NAPROXEN 500 MG: 500 TABLET ORAL at 12:07

## 2024-07-07 NOTE — ED NOTES
LOC: The patient is awake, alert, and oriented to self, place, time, and situation. Pt is calm and cooperative. Affect is appropriate.  Speech is appropriate and clear.     APPEARANCE: Patient resting uncomfortably, great toe right foot with pain, left knee with pain, left groin with burning sensation, in no acute distress.  Patient is clean and well groomed.    SKIN: The skin is warm and dry; color consistent with ethnicity.  Patient has normal skin turgor and moist mucus membranes.  Skin intact; no breakdown or bruising noted.     MUSCULOSKELETAL: Patient reporting pain to right foot great toe, left knee. Denies weakness.     RESPIRATORY: Airway is open and patent. Respirations spontaneous, even, easy, and non-labored.  Patient has a normal effort and rate.  No accessory muscle use noted. Denies cough.     CARDIAC:.  No peripheral edema noted. No complaints of chest pain.     ABDOMEN: Soft and non tender to palpation.  No distention noted. Pt denies abdominal pain; denies nausea, vomiting, diarrhea, or constipation.    NEUROLOGIC: Eyes open spontaneously.  Behavior appropriate to situation.  Follows commands; facial expression symmetrical.  Purposeful motor response noted; normal sensation in all extremities. Pt denies headache; denies lightheadedness or dizziness; denies visual disturbances; denies loss of balance; denies unilateral weakness.

## 2024-07-07 NOTE — ED PROVIDER NOTES
Encounter Date: 7/7/2024       History     Chief Complaint   Patient presents with    Multiple complaints     Think gout flaring up knee and foot, on r, pain also to r groin     59-year-old male with a history of gout presents with multiple complaints.  He has right toe pain that is similar to previous gout flares.  He also has pain to the left knee and left groin.  Started after driving back and forth from [place].  He denies trauma or acute inciting event.  He is out of his gout medications.  He is out of all pain medications.   Patient denies nausea, vomiting, diarrhea, fever, cough, shortness of breath, chest pain, abdominal pain, or dysuria.  The patients available PMH, PSH, Social History, medications, allergies, and triage vital signs were reviewed just prior to their medical evaluation.         Review of patient's allergies indicates:  No Known Allergies  Past Medical History:   Diagnosis Date    Coronary artery disease     COVID-19     Gout     Hyperlipidemia     Hypertension      Past Surgical History:   Procedure Laterality Date    CORONARY ANGIOGRAPHY INCLUDING BYPASS GRAFTS WITH CATHETERIZATION OF LEFT HEART N/A 9/8/2022    Procedure: ANGIOGRAM, CORONARY, INCLUDING BYPASS GRAFT, WITH LEFT HEART CATHETERIZATION;  Surgeon: Andrews Lopez MD;  Location[facility] CATH/EP LAB;  Service: Cardiology;  Laterality: N/A;    CORONARY ARTERY BYPASS GRAFT       Family History   Problem Relation Name Age of Onset    Heart disease Father       Social History     Tobacco Use    Smoking status: Former    Smokeless tobacco: Never   Substance Use Topics    Alcohol use: Not Currently    Drug use: Never     Review of Systems   Constitutional:  Negative for fever.   Respiratory:  Negative for cough and shortness of breath.    Cardiovascular:  Negative for chest pain.   Gastrointestinal:  Negative for abdominal pain, diarrhea, nausea and vomiting.   Genitourinary:  Negative for dysuria.   Musculoskeletal:  Positive for  arthralgias and myalgias.       Physical Exam     Initial Vitals [07/07/24 1030]   BP Pulse Resp Temp SpO2   136/87 65 16 97.7 °F (36.5 °C) 99 %      MAP       --         Physical Exam    Nursing note and vitals reviewed.  Constitutional: He appears well-developed and well-nourished. He is not diaphoretic. No distress.   HENT:   Head: Normocephalic and atraumatic.   Nose: Nose normal.   Eyes: Conjunctivae are normal. Right eye exhibits no discharge. Left eye exhibits no discharge.   Neck: Neck supple.   Normal range of motion.  Cardiovascular:  Normal rate, regular rhythm and normal heart sounds.     Exam reveals no gallop and no friction rub.       No murmur heard.  Pulmonary/Chest: Breath sounds normal. No respiratory distress. He has no wheezes. He has no rhonchi. He has no rales.   Abdominal: Abdomen is soft. He exhibits no distension. There is no abdominal tenderness. There is no rebound and no guarding.   Genitourinary:    Genitourinary Comments: Paramedic chaperone thorughout:  no hernia, normal penis and normal testes, ttp in lower left abdominal wall muscle     Musculoskeletal:         General: Tenderness present. No edema. Normal range of motion.      Cervical back: Normal range of motion and neck supple.      Comments: Left lcl and mcl ttp,no laxity, no effusion, normal rom, right great toe with ttp     Neurological: He is alert and oriented to person, place, and time. He has normal strength. GCS score is 15. GCS eye subscore is 4. GCS verbal subscore is 5. GCS motor subscore is 6.   Skin: Skin is warm and dry. No rash noted. No erythema.   Psychiatric: He has a normal mood and affect. His behavior is normal. Judgment and thought content normal.         ED Course   Procedures  Labs Reviewed   HIV 1 / 2 ANTIBODY   HEPATITIS C ANTIBODY          Imaging Results    None          Medications   colchicine capsule/tablet 1.2 mg (1.2 mg Oral Given 7/7/24 1201)   naproxen tablet 500 mg (500 mg Oral Given 7/7/24  1201)   HYDROcodone-acetaminophen 5-325 mg per tablet 1 tablet (1 tablet Oral Given 7/7/24 1201)     Medical Decision Making  59-year-old male presents with multiple complaints.  Vitals normal.  Physical exam as above.  Likely gout flare to the right great toe.  Treated with colchicine.  Suspect musculoskeletal strain to left knee LCL and MCL.  Suspect musculoskeletal strain to the left lower abdominal wall.  Treated with naproxen.  Will discharge with these medications and short course of hydrocodone.  No systemic steroids at this time.  Doubt septic joint, hernia, or any acute life threat.  Patient has PCP f/up this week.  Patient will return to ED for worsening symptoms, inability to eat/drink, fever greater than 100.4, or any other concerns. Did bedside teaching with return precautions.  All questions answered.  The patient acknowledges understanding.  Gave verbal discharge instructions.     Risk  Prescription drug management.                                      Clinical Impression:  Final diagnoses:  [S39.011A] Abdominal wall strain, initial encounter (Primary)  [S86.912A] Knee strain, left, initial encounter  [M10.9] Acute gout involving toe of right foot, unspecified cause          ED Disposition Condition    Discharge Stable          ED Prescriptions       Medication Sig Dispense Start Date End Date Auth. Provider    colchicine (COLCRYS) 0.6 mg tablet (Expires today) Take 1 tablet (0.6 mg total) by mouth once. for 1 dose 1 tablet 7/7/2024 7/7/2024 Imtiaz Pacheco MD    HYDROcodone-acetaminophen (NORCO) 5-325 mg per tablet Take 1 tablet by mouth every 6 (six) hours as needed for Pain. 12 tablet 7/7/2024 -- Imtiaz Pacheco MD    naproxen (NAPROSYN) 500 MG tablet Take 1 tablet (500 mg total) by mouth 2 (two) times daily with meals. 30 tablet 7/7/2024 -- Imtiaz Pacheco MD          Follow-up Information       Follow up With Specialties Details Why Contact Info    Follow up with primary physician  as soon as possible.  Call tomorrow for an appointment.        [facility] - Emergency Dept Emergency Medicine  Return to ED for worsening symptoms, inability to eat/drink, fever greater than 100.4, or any other concerns. [address]  [facility] [id number] [phone]             Imtiaz Pacheco MD  07/07/24 6892

## 2024-07-07 NOTE — DISCHARGE INSTRUCTIONS
While on naproxen do not take motrin/advil/ibuprofen.  You may take Tylenol over the counter as directed on packaging.     Do not drink or drive on this medication.  This medication puts you at risk for a fall.  Please use a cane or walker as needed.     Our goal in the emergency department is to always give you outstanding care and exceptional service. You may receive a survey by mail or e-mail in the next week regarding your experience in our ED. We would greatly appreciate your completing and returning the survey. Your feedback provides us with a way to recognize our staff who give very good care and it helps us learn how to improve when your experience was below our aspiration of excellence.

## 2024-07-07 NOTE — ED TRIAGE NOTES
To the ED with concerns  Gout right foot great toe with pain and redness.   Left knee pain, increases with mobility  Left groin with burning sensation for 3 days.

## 2024-08-08 ENCOUNTER — LAB VISIT (OUTPATIENT)
Dept: LAB | Facility: HOSPITAL | Age: 59
End: 2024-08-08
Payer: COMMERCIAL

## 2024-08-08 ENCOUNTER — OFFICE VISIT (OUTPATIENT)
Dept: INTERNAL MEDICINE | Facility: CLINIC | Age: 59
End: 2024-08-08
Payer: COMMERCIAL

## 2024-08-08 VITALS
DIASTOLIC BLOOD PRESSURE: 70 MMHG | BODY MASS INDEX: 26.48 KG/M2 | HEART RATE: 57 BPM | OXYGEN SATURATION: 98 % | WEIGHT: 185 LBS | HEIGHT: 70 IN | SYSTOLIC BLOOD PRESSURE: 125 MMHG

## 2024-08-08 DIAGNOSIS — I25.10 CORONARY ARTERY DISEASE INVOLVING NATIVE CORONARY ARTERY OF NATIVE HEART WITHOUT ANGINA PECTORIS: ICD-10-CM

## 2024-08-08 DIAGNOSIS — Z12.5 SCREENING FOR PROSTATE CANCER: ICD-10-CM

## 2024-08-08 DIAGNOSIS — M10.9 GOUT OF FOOT, UNSPECIFIED CAUSE, UNSPECIFIED CHRONICITY, UNSPECIFIED LATERALITY: ICD-10-CM

## 2024-08-08 DIAGNOSIS — Z95.1 HISTORY OF CORONARY ARTERY BYPASS GRAFT: ICD-10-CM

## 2024-08-08 DIAGNOSIS — R35.1 NOCTURIA: ICD-10-CM

## 2024-08-08 DIAGNOSIS — I10 ESSENTIAL HYPERTENSION: ICD-10-CM

## 2024-08-08 DIAGNOSIS — Z12.11 COLON CANCER SCREENING: ICD-10-CM

## 2024-08-08 DIAGNOSIS — E78.5 DYSLIPIDEMIA: ICD-10-CM

## 2024-08-08 DIAGNOSIS — H54.7 DECREASED VISION: ICD-10-CM

## 2024-08-08 DIAGNOSIS — M54.50 CHRONIC BILATERAL LOW BACK PAIN WITHOUT SCIATICA: ICD-10-CM

## 2024-08-08 DIAGNOSIS — I25.10 CORONARY ARTERY DISEASE INVOLVING NATIVE CORONARY ARTERY OF NATIVE HEART WITHOUT ANGINA PECTORIS: Primary | ICD-10-CM

## 2024-08-08 DIAGNOSIS — G89.29 CHRONIC BILATERAL LOW BACK PAIN WITHOUT SCIATICA: ICD-10-CM

## 2024-08-08 LAB
ALBUMIN SERPL BCP-MCNC: 4 G/DL (ref 3.5–5.2)
ALBUMIN SERPL BCP-MCNC: 4 G/DL (ref 3.5–5.2)
ALP SERPL-CCNC: 86 U/L (ref 55–135)
ALP SERPL-CCNC: 86 U/L (ref 55–135)
ALT SERPL W/O P-5'-P-CCNC: 28 U/L (ref 10–44)
ALT SERPL W/O P-5'-P-CCNC: 28 U/L (ref 10–44)
ANION GAP SERPL CALC-SCNC: 7 MMOL/L (ref 8–16)
ANION GAP SERPL CALC-SCNC: 7 MMOL/L (ref 8–16)
AST SERPL-CCNC: 25 U/L (ref 10–40)
AST SERPL-CCNC: 25 U/L (ref 10–40)
BASOPHILS # BLD AUTO: 0.01 K/UL (ref 0–0.2)
BASOPHILS NFR BLD: 0.2 % (ref 0–1.9)
BILIRUB SERPL-MCNC: 0.7 MG/DL (ref 0.1–1)
BILIRUB SERPL-MCNC: 0.7 MG/DL (ref 0.1–1)
BUN SERPL-MCNC: 12 MG/DL (ref 6–20)
BUN SERPL-MCNC: 12 MG/DL (ref 6–20)
CALCIUM SERPL-MCNC: 9.4 MG/DL (ref 8.7–10.5)
CALCIUM SERPL-MCNC: 9.4 MG/DL (ref 8.7–10.5)
CHLORIDE SERPL-SCNC: 108 MMOL/L (ref 95–110)
CHLORIDE SERPL-SCNC: 108 MMOL/L (ref 95–110)
CHOLEST SERPL-MCNC: 114 MG/DL (ref 120–199)
CHOLEST SERPL-MCNC: 114 MG/DL (ref 120–199)
CHOLEST/HDLC SERPL: 3.1 {RATIO} (ref 2–5)
CHOLEST/HDLC SERPL: 3.1 {RATIO} (ref 2–5)
CO2 SERPL-SCNC: 26 MMOL/L (ref 23–29)
CO2 SERPL-SCNC: 26 MMOL/L (ref 23–29)
COMPLEXED PSA SERPL-MCNC: 0.98 NG/ML (ref 0–4)
CREAT SERPL-MCNC: 1 MG/DL (ref 0.5–1.4)
CREAT SERPL-MCNC: 1 MG/DL (ref 0.5–1.4)
DIFFERENTIAL METHOD BLD: ABNORMAL
EOSINOPHIL # BLD AUTO: 0.2 K/UL (ref 0–0.5)
EOSINOPHIL NFR BLD: 3.2 % (ref 0–8)
ERYTHROCYTE [DISTWIDTH] IN BLOOD BY AUTOMATED COUNT: 11.9 % (ref 11.5–14.5)
EST. GFR  (NO RACE VARIABLE): >60 ML/MIN/1.73 M^2
EST. GFR  (NO RACE VARIABLE): >60 ML/MIN/1.73 M^2
ESTIMATED AVG GLUCOSE: 123 MG/DL (ref 68–131)
GLUCOSE SERPL-MCNC: 103 MG/DL (ref 70–110)
GLUCOSE SERPL-MCNC: 103 MG/DL (ref 70–110)
HBA1C MFR BLD: 5.9 % (ref 4–5.6)
HCT VFR BLD AUTO: 41.3 % (ref 40–54)
HDLC SERPL-MCNC: 37 MG/DL (ref 40–75)
HDLC SERPL-MCNC: 37 MG/DL (ref 40–75)
HDLC SERPL: 32.5 % (ref 20–50)
HDLC SERPL: 32.5 % (ref 20–50)
HGB BLD-MCNC: 13.4 G/DL (ref 14–18)
IMM GRANULOCYTES # BLD AUTO: 0.02 K/UL (ref 0–0.04)
IMM GRANULOCYTES NFR BLD AUTO: 0.3 % (ref 0–0.5)
LDLC SERPL CALC-MCNC: 65.8 MG/DL (ref 63–159)
LDLC SERPL CALC-MCNC: 65.8 MG/DL (ref 63–159)
LYMPHOCYTES # BLD AUTO: 1.8 K/UL (ref 1–4.8)
LYMPHOCYTES NFR BLD: 29.6 % (ref 18–48)
MCH RBC QN AUTO: 29.8 PG (ref 27–31)
MCHC RBC AUTO-ENTMCNC: 32.4 G/DL (ref 32–36)
MCV RBC AUTO: 92 FL (ref 82–98)
MONOCYTES # BLD AUTO: 0.5 K/UL (ref 0.3–1)
MONOCYTES NFR BLD: 8.6 % (ref 4–15)
NEUTROPHILS # BLD AUTO: 3.6 K/UL (ref 1.8–7.7)
NEUTROPHILS NFR BLD: 58.1 % (ref 38–73)
NONHDLC SERPL-MCNC: 77 MG/DL
NONHDLC SERPL-MCNC: 77 MG/DL
NRBC BLD-RTO: 0 /100 WBC
PLATELET # BLD AUTO: 165 K/UL (ref 150–450)
PMV BLD AUTO: 11.1 FL (ref 9.2–12.9)
POTASSIUM SERPL-SCNC: 4.2 MMOL/L (ref 3.5–5.1)
POTASSIUM SERPL-SCNC: 4.2 MMOL/L (ref 3.5–5.1)
PROT SERPL-MCNC: 7.1 G/DL (ref 6–8.4)
PROT SERPL-MCNC: 7.1 G/DL (ref 6–8.4)
RBC # BLD AUTO: 4.49 M/UL (ref 4.6–6.2)
SODIUM SERPL-SCNC: 141 MMOL/L (ref 136–145)
SODIUM SERPL-SCNC: 141 MMOL/L (ref 136–145)
TRIGL SERPL-MCNC: 56 MG/DL (ref 30–150)
TRIGL SERPL-MCNC: 56 MG/DL (ref 30–150)
TSH SERPL DL<=0.005 MIU/L-ACNC: 1.86 UIU/ML (ref 0.4–4)
URATE SERPL-MCNC: 8 MG/DL (ref 3.4–7)
WBC # BLD AUTO: 6.18 K/UL (ref 3.9–12.7)

## 2024-08-08 PROCEDURE — 99999 PR PBB SHADOW E&M-EST. PATIENT-LVL V: CPT | Mod: PBBFAC,,, | Performed by: NURSE PRACTITIONER

## 2024-08-08 PROCEDURE — 3078F DIAST BP <80 MM HG: CPT | Mod: CPTII,S$GLB,, | Performed by: NURSE PRACTITIONER

## 2024-08-08 PROCEDURE — 3074F SYST BP LT 130 MM HG: CPT | Mod: CPTII,S$GLB,, | Performed by: NURSE PRACTITIONER

## 2024-08-08 PROCEDURE — 4010F ACE/ARB THERAPY RXD/TAKEN: CPT | Mod: CPTII,S$GLB,, | Performed by: NURSE PRACTITIONER

## 2024-08-08 PROCEDURE — 85025 COMPLETE CBC W/AUTO DIFF WBC: CPT | Performed by: NURSE PRACTITIONER

## 2024-08-08 PROCEDURE — 99204 OFFICE O/P NEW MOD 45 MIN: CPT | Mod: S$GLB,,, | Performed by: NURSE PRACTITIONER

## 2024-08-08 PROCEDURE — 1159F MED LIST DOCD IN RCRD: CPT | Mod: CPTII,S$GLB,, | Performed by: NURSE PRACTITIONER

## 2024-08-08 PROCEDURE — 80053 COMPREHEN METABOLIC PANEL: CPT | Performed by: NURSE PRACTITIONER

## 2024-08-08 PROCEDURE — 3008F BODY MASS INDEX DOCD: CPT | Mod: CPTII,S$GLB,, | Performed by: NURSE PRACTITIONER

## 2024-08-08 PROCEDURE — 84153 ASSAY OF PSA TOTAL: CPT | Performed by: NURSE PRACTITIONER

## 2024-08-08 PROCEDURE — 36415 COLL VENOUS BLD VENIPUNCTURE: CPT | Performed by: NURSE PRACTITIONER

## 2024-08-08 PROCEDURE — 80061 LIPID PANEL: CPT | Performed by: NURSE PRACTITIONER

## 2024-08-08 PROCEDURE — 84443 ASSAY THYROID STIM HORMONE: CPT | Performed by: NURSE PRACTITIONER

## 2024-08-08 PROCEDURE — 84550 ASSAY OF BLOOD/URIC ACID: CPT | Performed by: NURSE PRACTITIONER

## 2024-08-08 PROCEDURE — 83036 HEMOGLOBIN GLYCOSYLATED A1C: CPT | Performed by: NURSE PRACTITIONER

## 2024-08-08 RX ORDER — METOPROLOL SUCCINATE 100 MG/1
100 TABLET, EXTENDED RELEASE ORAL DAILY
Qty: 90 TABLET | Refills: 1 | Status: CANCELLED | OUTPATIENT
Start: 2024-08-08 | End: 2025-08-09

## 2024-08-08 RX ORDER — ATORVASTATIN CALCIUM 80 MG/1
80 TABLET, FILM COATED ORAL DAILY
Qty: 90 TABLET | Refills: 1 | Status: SHIPPED | OUTPATIENT
Start: 2024-08-08

## 2024-08-08 RX ORDER — LOSARTAN POTASSIUM 25 MG/1
25 TABLET ORAL DAILY
Qty: 90 TABLET | Refills: 1 | Status: SHIPPED | OUTPATIENT
Start: 2024-08-08 | End: 2025-08-09

## 2024-08-08 RX ORDER — ATORVASTATIN CALCIUM 80 MG/1
80 TABLET, FILM COATED ORAL
COMMUNITY
End: 2024-08-08 | Stop reason: SDUPTHER

## 2024-08-08 RX ORDER — COLCHICINE 0.6 MG/1
TABLET ORAL
Qty: 10 TABLET | Refills: 0 | Status: SHIPPED | OUTPATIENT
Start: 2024-08-08

## 2024-08-09 ENCOUNTER — TELEPHONE (OUTPATIENT)
Dept: INTERNAL MEDICINE | Facility: CLINIC | Age: 59
End: 2024-08-09
Payer: COMMERCIAL

## 2024-08-09 DIAGNOSIS — M10.9 GOUT OF FOOT, UNSPECIFIED CAUSE, UNSPECIFIED CHRONICITY, UNSPECIFIED LATERALITY: Primary | ICD-10-CM

## 2024-08-09 RX ORDER — ALLOPURINOL 100 MG/1
100 TABLET ORAL DAILY
Qty: 30 TABLET | Refills: 5 | Status: SHIPPED | OUTPATIENT
Start: 2024-08-09

## 2024-08-14 ENCOUNTER — TELEPHONE (OUTPATIENT)
Dept: OPTOMETRY | Facility: CLINIC | Age: 59
End: 2024-08-14
Payer: COMMERCIAL

## 2024-09-30 DIAGNOSIS — E78.5 DYSLIPIDEMIA: ICD-10-CM

## 2024-10-01 RX ORDER — ATORVASTATIN CALCIUM 80 MG/1
80 TABLET, FILM COATED ORAL DAILY
Qty: 90 TABLET | Refills: 3 | Status: SHIPPED | OUTPATIENT
Start: 2024-10-01

## 2024-10-15 ENCOUNTER — TELEPHONE (OUTPATIENT)
Dept: ENDOSCOPY | Facility: HOSPITAL | Age: 59
End: 2024-10-15
Payer: COMMERCIAL

## 2024-10-15 ENCOUNTER — CLINICAL SUPPORT (OUTPATIENT)
Dept: ENDOSCOPY | Facility: HOSPITAL | Age: 59
End: 2024-10-15
Payer: COMMERCIAL

## 2024-10-15 VITALS — WEIGHT: 185 LBS | HEIGHT: 70 IN | BODY MASS INDEX: 26.48 KG/M2

## 2024-10-15 DIAGNOSIS — Z12.11 COLON CANCER SCREENING: ICD-10-CM

## 2024-10-15 RX ORDER — SODIUM, POTASSIUM,MAG SULFATES 17.5-3.13G
1 SOLUTION, RECONSTITUTED, ORAL ORAL DAILY
Qty: 1 KIT | Refills: 0 | Status: SHIPPED | OUTPATIENT
Start: 2024-10-15 | End: 2024-10-17

## 2024-10-15 NOTE — PLAN OF CARE
Spoke to pt to schedule procedure(s) Colonoscopy       Physician to perform procedure(s) Dr. ALLYSON Kraft  Date of Procedure (s) 12/2/24  Arrival Time 9:00 AM  Time of Procedure(s) 10:00 AM   Location of Procedure(s) [place] 2nd Floor  Type of Rx Prep sent to patient: Suprep  Instructions provided to patient via [facility]    The patient is currently under an internal cardiologist Keesha Mckee NP (but is planning on making an appointment with a new MD now that he's moved) care and requires a clearance Cardiology for their upcoming scheduled Colonoscopy on 12/2/24.      Patient was informed on the following information and verbalized understanding. Screening questionnaire reviewed with patient and complete. If procedure requires anesthesia, a responsible adult needs to be present to accompany the patient home, patient cannot drive after receiving anesthesia. Appointment details are tentative, especially check-in time. Patient will receive a prep-op call 7 days prior to confirm check-in time for procedure. If applicable the patient should contact their pharmacy to verify Rx for procedure prep is ready for pick-up. Patient was advised to call the scheduling department at [phone] if pharmacy states no Rx is available. Patient was advised to call the endoscopy scheduling department if any questions or concerns arise.       Endoscopy Scheduling Department

## 2024-10-15 NOTE — TELEPHONE ENCOUNTER
Spoke to pt to schedule procedure(s) Colonoscopy       Physician to perform procedure(s) Dr. ALLYSON Kraft  Date of Procedure (s) 12/2/24  Arrival Time 9:00 AM  Time of Procedure(s) 10:00 AM   Location of Procedure(s) [place] 2nd Floor  Type of Rx Prep sent to patient: Suprep  Instructions provided to patient via [facility]    The patient is currently under an internal cardiologist Keesha Mckee NP (but is planning on making an appointment with a new MD now that he's moved) care and requires a clearance Cardiology for their upcoming scheduled Colonoscopy on 12/2/24.      Patient was informed on the following information and verbalized understanding. Screening questionnaire reviewed with patient and complete. If procedure requires anesthesia, a responsible adult needs to be present to accompany the patient home, patient cannot drive after receiving anesthesia. Appointment details are tentative, especially check-in time. Patient will receive a prep-op call 7 days prior to confirm check-in time for procedure. If applicable the patient should contact their pharmacy to verify Rx for procedure prep is ready for pick-up. Patient was advised to call the scheduling department at [phone] if pharmacy states no Rx is available. Patient was advised to call the endoscopy scheduling department if any questions or concerns arise.       Endoscopy Scheduling Department

## 2024-10-18 ENCOUNTER — TELEPHONE (OUTPATIENT)
Dept: ENDOSCOPY | Facility: HOSPITAL | Age: 59
End: 2024-10-18
Payer: COMMERCIAL

## 2024-10-18 ENCOUNTER — TELEPHONE (OUTPATIENT)
Dept: CARDIOLOGY | Facility: CLINIC | Age: 59
End: 2024-10-18
Payer: COMMERCIAL

## 2024-10-18 NOTE — TELEPHONE ENCOUNTER
Dear KATY Mckee NP,    Patient has a scheduled procedure Colonoscopy 12/2/24 and in order to ensure patient safety, we would like to confirm if he/she can be cleared for the procedure.      Thank you for your prompt reply.    [facility] Endoscopy Scheduling

## 2024-10-18 NOTE — TELEPHONE ENCOUNTER
10/18/24  Sw the patient about the Sx cl from [facility] Endoscopy being   requested for him patient he was informed that a slot on 10/31/24 @10:30 am was given

## 2024-10-18 NOTE — TELEPHONE ENCOUNTER
----- Message from BUNNY Parisi sent at 10/15/2024  8:46 AM CDT -----  Regardin24 CL  The patient is currently under an internal cardiologist Keesha Mckee NP (but is planning on making an appointment with a new MD now that he's moved) care and requires a clearance Cardiology for their upcoming scheduled Colonoscopy on 24.

## 2024-11-06 ENCOUNTER — OFFICE VISIT (OUTPATIENT)
Dept: CARDIOLOGY | Facility: CLINIC | Age: 59
End: 2024-11-06
Payer: COMMERCIAL

## 2024-11-06 ENCOUNTER — LAB VISIT (OUTPATIENT)
Dept: LAB | Facility: HOSPITAL | Age: 59
End: 2024-11-06
Attending: NURSE PRACTITIONER
Payer: COMMERCIAL

## 2024-11-06 VITALS
OXYGEN SATURATION: 97 % | SYSTOLIC BLOOD PRESSURE: 132 MMHG | HEART RATE: 60 BPM | BODY MASS INDEX: 27.26 KG/M2 | DIASTOLIC BLOOD PRESSURE: 84 MMHG | WEIGHT: 190 LBS

## 2024-11-06 DIAGNOSIS — Z01.818 PRE-OP EVALUATION: Primary | ICD-10-CM

## 2024-11-06 DIAGNOSIS — I50.1: ICD-10-CM

## 2024-11-06 DIAGNOSIS — E78.5 DYSLIPIDEMIA: ICD-10-CM

## 2024-11-06 DIAGNOSIS — I10 ESSENTIAL HYPERTENSION: ICD-10-CM

## 2024-11-06 DIAGNOSIS — Z01.818 PRE-OP EVALUATION: ICD-10-CM

## 2024-11-06 LAB
ALBUMIN SERPL BCP-MCNC: 4.5 G/DL (ref 3.5–5.2)
ALP SERPL-CCNC: 90 U/L (ref 55–135)
ALT SERPL W/O P-5'-P-CCNC: 18 U/L (ref 10–44)
ANION GAP SERPL CALC-SCNC: 5 MMOL/L (ref 8–16)
AST SERPL-CCNC: 16 U/L (ref 10–40)
BILIRUB SERPL-MCNC: 0.7 MG/DL (ref 0.1–1)
BNP SERPL-MCNC: 101 PG/ML (ref 0–99)
BUN SERPL-MCNC: 8 MG/DL (ref 6–20)
CALCIUM SERPL-MCNC: 9.4 MG/DL (ref 8.7–10.5)
CHLORIDE SERPL-SCNC: 108 MMOL/L (ref 95–110)
CO2 SERPL-SCNC: 29 MMOL/L (ref 23–29)
CREAT SERPL-MCNC: 1 MG/DL (ref 0.5–1.4)
EST. GFR  (NO RACE VARIABLE): >60 ML/MIN/1.73 M^2
GLUCOSE SERPL-MCNC: 87 MG/DL (ref 70–110)
POTASSIUM SERPL-SCNC: 4.2 MMOL/L (ref 3.5–5.1)
PROT SERPL-MCNC: 7.5 G/DL (ref 6–8.4)
SODIUM SERPL-SCNC: 142 MMOL/L (ref 136–145)
TSH SERPL DL<=0.005 MIU/L-ACNC: 2.15 UIU/ML (ref 0.34–5.6)

## 2024-11-06 PROCEDURE — 3075F SYST BP GE 130 - 139MM HG: CPT | Mod: CPTII,S$GLB,, | Performed by: NURSE PRACTITIONER

## 2024-11-06 PROCEDURE — 3008F BODY MASS INDEX DOCD: CPT | Mod: CPTII,S$GLB,, | Performed by: NURSE PRACTITIONER

## 2024-11-06 PROCEDURE — 99214 OFFICE O/P EST MOD 30 MIN: CPT | Mod: S$GLB,,, | Performed by: NURSE PRACTITIONER

## 2024-11-06 PROCEDURE — 3044F HG A1C LEVEL LT 7.0%: CPT | Mod: CPTII,S$GLB,, | Performed by: NURSE PRACTITIONER

## 2024-11-06 PROCEDURE — 83880 ASSAY OF NATRIURETIC PEPTIDE: CPT | Performed by: NURSE PRACTITIONER

## 2024-11-06 PROCEDURE — 4010F ACE/ARB THERAPY RXD/TAKEN: CPT | Mod: CPTII,S$GLB,, | Performed by: NURSE PRACTITIONER

## 2024-11-06 PROCEDURE — 84443 ASSAY THYROID STIM HORMONE: CPT | Performed by: NURSE PRACTITIONER

## 2024-11-06 PROCEDURE — 80053 COMPREHEN METABOLIC PANEL: CPT | Performed by: NURSE PRACTITIONER

## 2024-11-06 PROCEDURE — 3079F DIAST BP 80-89 MM HG: CPT | Mod: CPTII,S$GLB,, | Performed by: NURSE PRACTITIONER

## 2024-11-06 PROCEDURE — 99999 PR PBB SHADOW E&M-EST. PATIENT-LVL II: CPT | Mod: PBBFAC,,, | Performed by: NURSE PRACTITIONER

## 2024-11-06 PROCEDURE — 36415 COLL VENOUS BLD VENIPUNCTURE: CPT | Performed by: NURSE PRACTITIONER

## 2024-11-06 NOTE — LETTER
2024    Irwin Richard  [address]  [address]  [place] [address]             [facility]  [address]  [address]  [place] [address]  Phone: [phone]  Fax: [phone] Patient: Irwin Richard  : 1965  Referring Doctor: Keesha Mckee NP  Telephone:[phone]  Date of Last Office Visit:2024  Consulting Provider: Zackery Kraft MD  Procedure : Colonoscopy  Procedure date: 2024    Current Outpatient Medications   Medication Sig    allopurinoL (ZYLOPRIM) 100 MG tablet Take 1 tablet (100 mg total) by mouth once daily.    aspirin 81 MG Chew Take 1 tablet (81 mg total) by mouth once daily.    atorvastatin (LIPITOR) 80 MG tablet Take 1 tablet (80 mg total) by mouth once daily.    colchicine (COLCRYS) 0.6 mg tablet Take 1 tablet by mouth daily as needed at first sign of gout flare    losartan (COZAAR) 25 MG tablet Take 1 tablet (25 mg total) by mouth once daily.    metoprolol succinate (TOPROL-XL) 100 MG 24 hr tablet Take 1 tablet (100 mg total) by mouth once daily.    nitroGLYCERIN (NITROSTAT) 0.4 MG SL tablet Place 1 tablet (0.4 mg total) under the tongue every 5 (five) minutes as needed for Chest pain.    HYDROcodone-acetaminophen (NORCO) 5-325 mg per tablet Take 1 tablet by mouth every 6 (six) hours as needed for Pain. (Patient not taking: Reported on 2024)     Current Facility-Administered Medications   Medication    acetaminophen tablet 650 mg       This patient has been assessed for risk factors for clearance of surgery with the following stipulations:    ___ No contraindications  _ x__ Recommendations for antiplatelet/anticoagulant medications:HOLD ASA for 7 days prior to procedure.    High Risk ____ Moderate Risk__ _x_ Low Risk_____  __x _ Cleared for surgery with the following contraindications/precautions:  ___ Not cleared for surgery due to the following reasons:      If you have any  questions regarding the above, please contact my office at [phone]    Sincerely,    Keesha Mckee NP

## 2024-11-06 NOTE — PROGRESS NOTES
Subjective:    Patient ID:  Irwin Richard is a 59 y.o. male who presents for follow-up   Chief Complaint   Patient presents with    Pre-op Exam     Colonoscopy        HPI:      Irwin Richard is here for follow-up visit. Denies chest pain or shortness of breath. Denies recent fever cough chills or congestion. Denies blood in the urine or blood in the stool.  Denies myalgias. Denies orthopnea or peripheral edema. Denies nausea vomiting or dyspepsia. No recent arm neck or jaw pain. No lightheadedness or dizziness.       Review of patient's allergies indicates:  No Known Allergies    Past Medical History:   Diagnosis Date    Coronary artery disease     COVID-19     Gout     Hyperlipidemia     Hypertension      Past Surgical History:   Procedure Laterality Date    CORONARY ANGIOGRAPHY INCLUDING BYPASS GRAFTS WITH CATHETERIZATION OF LEFT HEART N/A 9/8/2022    Procedure: ANGIOGRAM, CORONARY, INCLUDING BYPASS GRAFT, WITH LEFT HEART CATHETERIZATION;  Surgeon: Andrews Lopez MD;  Location: [facility] CATH/EP LAB;  Service: Cardiology;  Laterality: N/A;    CORONARY ARTERY BYPASS GRAFT       Social History     Tobacco Use    Smoking status: Former    Smokeless tobacco: Never   Substance Use Topics    Alcohol use: Not Currently    Drug use: Never     Family History   Problem Relation Name Age of Onset    Heart disease Father          Review of Systems:   Per hpi         Objective:        Vitals:    11/06/24 1316   BP: 132/84   Pulse: 60       Lab Results   Component Value Date    WBC 6.18 08/08/2024    HGB 13.4 (L) 08/08/2024    HCT 41.3 08/08/2024     08/08/2024    CHOL 114 (L) 08/08/2024    CHOL 114 (L) 08/08/2024    TRIG 56 08/08/2024    TRIG 56 08/08/2024    HDL 37 (L) 08/08/2024    HDL 37 (L) 08/08/2024    ALT 18 11/06/2024    AST 16 11/06/2024     11/06/2024    K 4.2 11/06/2024     11/06/2024    CREATININE 1.0 11/06/2024    BUN 8 11/06/2024    CO2 29 11/06/2024    TSH 2.154 11/06/2024    PSA 0.98  08/08/2024    HGBA1C 5.9 (H) 08/08/2024        ECHOCARDIOGRAM RESULTS  Results for orders placed during the hospital encounter of 07/28/22    Echo    Interpretation Summary  · The left ventricle is normal in size with concentric remodeling and low normal systolic function.  · The estimated ejection fraction is 50%.  · Normal left ventricular diastolic function.  · There is mild left ventricular global hypokinesis.  · Normal right ventricular size with normal right ventricular systolic function.  · Normal central venous pressure (3 mmHg).  · The estimated PA systolic pressure is 24 mmHg.        CURRENT/PREVIOUS VISIT EKG  Results for orders placed or performed during the hospital encounter of 03/25/23   EKG 12-lead    Collection Time: 03/25/23  5:59 PM    Narrative    Test Reason : R07.9,    Vent. Rate : 062 BPM     Atrial Rate : 062 BPM     P-R Int : 172 ms          QRS Dur : 084 ms      QT Int : 396 ms       P-R-T Axes : 041 -01 062 degrees     QTc Int : 401 ms    Normal sinus rhythm  Normal ECG  When compared with ECG of 25-MAR-2023 16:06,  No significant change was found  Confirmed by Margarito GUZMAN MD ([id number]) on 3/26/2023 9:42:41 AM    Referred By: [email or web address]   SELF           Confirmed By:Margarito GUZMAN MD     No valid procedures specified.   Results for orders placed during the hospital encounter of 07/28/22    Nuclear Stress Test    Interpretation Summary    The EKG portion of this study is negative for ischemia.    The patient reported no chest pain during the stress test.    During stress, rare PVCs are noted.    The nuclear portion of this study will be reported separately.      Physical Exam:  CONSTITUTIONAL: No fever, no chills  HEENT: Normocephalic, atraumatic,pupils reactive to light                 NECK:  No JVD no carotid bruit  CVS: S1S2+, RRR, no murmurs,   LUNGS: Clear  ABDOMEN: Soft, NT, BS+  EXTREMITIES: No cyanosis, edema  : No blount catheter  NEURO: AAO X 3  PSY: Normal affect      Medication List with  Changes/Refills   Current Medications    ALLOPURINOL (ZYLOPRIM) 100 MG TABLET    Take 1 tablet (100 mg total) by mouth once daily.    ASPIRIN 81 MG CHEW    Take 1 tablet (81 mg total) by mouth once daily.    ATORVASTATIN (LIPITOR) 80 MG TABLET    Take 1 tablet (80 mg total) by mouth once daily.    COLCHICINE (COLCRYS) 0.6 MG TABLET    Take 1 tablet by mouth daily as needed at first sign of gout flare    HYDROCODONE-ACETAMINOPHEN (NORCO) 5-325 MG PER TABLET    Take 1 tablet by mouth every 6 (six) hours as needed for Pain.    LOSARTAN (COZAAR) 25 MG TABLET    Take 1 tablet (25 mg total) by mouth once daily.    METOPROLOL SUCCINATE (TOPROL-XL) 100 MG 24 HR TABLET    Take 1 tablet (100 mg total) by mouth once daily.    NITROGLYCERIN (NITROSTAT) 0.4 MG SL TABLET    Place 1 tablet (0.4 mg total) under the tongue every 5 (five) minutes as needed for Chest pain.   Discontinued Medications    DICLOFENAC SODIUM (VOLTAREN) 1 % GEL    Apply 2 g topically 4 (four) times daily.             Assessment:       1. Pre-op evaluation    2. Essential hypertension    3. Dyslipidemia    4. Heart failure, left, with LVEF >40%         Plan:       FATIGUE AND TIREDNESS AND WEIGHT GAIN   CHECK TSH CHECK BNP   ECHOCARDIOGRAM TO EVALUATE FOR LVEF   EKG IS NORMAL SINUS RHYTHM NO ANGINAL SYMPTOMS OKAY FOR COLONOSCOPY AS PLANNED    Hypertension Medications               losartan (COZAAR) 25 MG tablet Take 1 tablet (25 mg total) by mouth once daily.    metoprolol succinate (TOPROL-XL) 100 MG 24 hr tablet Take 1 tablet (100 mg total) by mouth once daily.    nitroGLYCERIN (NITROSTAT) 0.4 MG SL tablet Place 1 tablet (0.4 mg total) under the tongue every 5 (five) minutes as needed for Chest pain.               Problem List Items Addressed This Visit       Essential hypertension    Dyslipidemia    Pre-op evaluation - Primary    Relevant Orders    IN OFFICE EKG 12-LEAD (to Muse)    Echo    Comprehensive Metabolic Panel (Completed)    BNP (Completed)     TSH (Completed)    Heart failure, left, with LVEF >40%       No follow-ups on file.       KLAUDIA Sewell-ACNP, CVNP-BC

## 2024-11-13 ENCOUNTER — OFFICE VISIT (OUTPATIENT)
Dept: INTERNAL MEDICINE | Facility: CLINIC | Age: 59
End: 2024-11-13
Payer: COMMERCIAL

## 2024-11-13 VITALS
HEART RATE: 52 BPM | DIASTOLIC BLOOD PRESSURE: 86 MMHG | BODY MASS INDEX: 27.94 KG/M2 | WEIGHT: 195.13 LBS | HEIGHT: 70 IN | OXYGEN SATURATION: 96 % | SYSTOLIC BLOOD PRESSURE: 130 MMHG

## 2024-11-13 DIAGNOSIS — M10.9 GOUT OF FOOT, UNSPECIFIED CAUSE, UNSPECIFIED CHRONICITY, UNSPECIFIED LATERALITY: ICD-10-CM

## 2024-11-13 DIAGNOSIS — M79.642 LEFT HAND PAIN: ICD-10-CM

## 2024-11-13 DIAGNOSIS — R07.9 CHEST PAIN, UNSPECIFIED TYPE: ICD-10-CM

## 2024-11-13 DIAGNOSIS — I10 ESSENTIAL HYPERTENSION: ICD-10-CM

## 2024-11-13 DIAGNOSIS — I25.10 CORONARY ARTERY DISEASE INVOLVING NATIVE CORONARY ARTERY OF NATIVE HEART WITHOUT ANGINA PECTORIS: Primary | ICD-10-CM

## 2024-11-13 PROCEDURE — 99214 OFFICE O/P EST MOD 30 MIN: CPT | Mod: S$GLB,,, | Performed by: INTERNAL MEDICINE

## 2024-11-13 PROCEDURE — 3075F SYST BP GE 130 - 139MM HG: CPT | Mod: CPTII,S$GLB,, | Performed by: INTERNAL MEDICINE

## 2024-11-13 PROCEDURE — 3044F HG A1C LEVEL LT 7.0%: CPT | Mod: CPTII,S$GLB,, | Performed by: INTERNAL MEDICINE

## 2024-11-13 PROCEDURE — 3008F BODY MASS INDEX DOCD: CPT | Mod: CPTII,S$GLB,, | Performed by: INTERNAL MEDICINE

## 2024-11-13 PROCEDURE — 3079F DIAST BP 80-89 MM HG: CPT | Mod: CPTII,S$GLB,, | Performed by: INTERNAL MEDICINE

## 2024-11-13 PROCEDURE — 99999 PR PBB SHADOW E&M-EST. PATIENT-LVL IV: CPT | Mod: PBBFAC,,, | Performed by: INTERNAL MEDICINE

## 2024-11-13 PROCEDURE — 4010F ACE/ARB THERAPY RXD/TAKEN: CPT | Mod: CPTII,S$GLB,, | Performed by: INTERNAL MEDICINE

## 2024-11-13 RX ORDER — ALLOPURINOL 100 MG/1
100 TABLET ORAL DAILY
Qty: 30 TABLET | Refills: 5 | Status: SHIPPED | OUTPATIENT
Start: 2024-11-13 | End: 2024-11-13

## 2024-11-13 RX ORDER — ALLOPURINOL 100 MG/1
100 TABLET ORAL DAILY
Qty: 30 TABLET | Refills: 5 | Status: SHIPPED | OUTPATIENT
Start: 2024-11-13

## 2024-11-13 NOTE — PROGRESS NOTES
Subjective:       Patient ID: Irwin Richard is a 59 y.o. male.    Chief Complaint: Establish Care    HPI      Presents to establish care.     Having Upper chest pain, sharp stabbing pain   For years.   2-3 times week, lasts a couple seconds.   Mostly happens with sitting     CAD    HLD on statin    Hx of gout on allopurinol     HTN: well controlled on losartan       Extensive cardiac hx- CAD s/p CABG cardiac stents. Compliant with statin (needs refill), ASA, losartan 25 mg and toprol 100 mg. Has nitro if he needs it but hasn't had to use it.     Former smoker. Quit about 7 years ago.   Health Maintenance:  Colon Cancer Screening: disucss next visit   HIV:  negative 2023   Hep C: negative 2023   Lipids: normal 2024   Vaccines:  needs flu and shingles.       Review of Systems   Constitutional:  Negative for chills, diaphoresis, fatigue and fever.   HENT:  Negative for rhinorrhea, sneezing and sore throat.    Respiratory:  Negative for cough, chest tightness, shortness of breath and wheezing.    Cardiovascular:  Negative for chest pain, palpitations and leg swelling.   Gastrointestinal:  Negative for abdominal pain, blood in stool, diarrhea, nausea and vomiting.   Musculoskeletal:  Negative for arthralgias and back pain.   Neurological:  Negative for dizziness, weakness, light-headedness and headaches.   Psychiatric/Behavioral:  Negative for agitation and behavioral problems.            Past Medical History:   Diagnosis Date    Coronary artery disease     COVID-19     Gout     Hyperlipidemia     Hypertension      Past Surgical History:   Procedure Laterality Date    CORONARY ANGIOGRAPHY INCLUDING BYPASS GRAFTS WITH CATHETERIZATION OF LEFT HEART N/A 9/8/2022    Procedure: ANGIOGRAM, CORONARY, INCLUDING BYPASS GRAFT, WITH LEFT HEART CATHETERIZATION;  Surgeon: Andrews Lopez MD;  Location: [facility] CATH/EP LAB;  Service: Cardiology;  Laterality: N/A;    CORONARY ARTERY BYPASS GRAFT        Patient Active Problem List    Diagnosis    History of coronary artery bypass graft    Gout of foot    Chronic bilateral low back pain without sciatica    Essential hypertension    Dyslipidemia    CAD (coronary artery disease)    New onset of congestive heart failure    Abnormal stress test    Chest pain    Pre-op evaluation    Heart failure, left, with LVEF >40%        Objective:      Physical Exam  Constitutional:       Appearance: Normal appearance.   HENT:      Head: Normocephalic and atraumatic.   Cardiovascular:      Rate and Rhythm: Normal rate and regular rhythm.      Heart sounds: Normal heart sounds.   Pulmonary:      Effort: Pulmonary effort is normal.      Breath sounds: Normal breath sounds. No stridor. No wheezing or rales.   Abdominal:      General: Abdomen is flat.      Palpations: Abdomen is soft. There is no mass.      Tenderness: There is no abdominal tenderness.   Skin:     General: Skin is warm and dry.   Neurological:      Mental Status: He is alert and oriented to person, place, and time.   Psychiatric:         Mood and Affect: Mood normal.         Assessment:       Problem List Items Addressed This Visit          Cardiac/Vascular    Essential hypertension    Chest pain    Relevant Orders    Ambulatory referral/consult to Cardiology    X-Ray Chest PA And Lateral (Completed)    CAD (coronary artery disease) - Primary    Relevant Orders    Echo    Ambulatory referral/consult to Cardiology    X-Ray Chest PA And Lateral (Completed)       Orthopedic    Gout of foot    Relevant Medications    allopurinoL (ZYLOPRIM) 100 MG tablet     Other Visit Diagnoses       Left hand pain        Relevant Orders    X-Ray Hand 3 View Left (Completed)    Ambulatory referral/consult to Hand Surgery            Plan:         Irwin was seen today for establish care.    Diagnoses and all orders for this visit:    Coronary artery disease involving native coronary artery of native heart without angina pectoris  Chest pain, unspecified type  -      Echo; Future  -     Ambulatory referral/consult to Cardiology; Future  -     X-Ray Chest PA And Lateral; Future  Check EKG and echo   Referral to cardiology at [facility]     Essential hypertension  Well controlled on current meds     Left hand pain  -     X-Ray Hand 3 View Left; Future  -     Ambulatory referral/consult to Hand Surgery; Future    Gout of foot, unspecified cause, unspecified chronicity, unspecified laterality  -     allopurinoL (ZYLOPRIM) 100 MG tablet; Take 1 tablet (100 mg total) by mouth once daily.                   Maggie Burgos MD   Internal Medicine   Primary Care

## 2024-11-15 DIAGNOSIS — M10.9 ACUTE GOUT INVOLVING TOE OF RIGHT FOOT, UNSPECIFIED CAUSE: ICD-10-CM

## 2024-11-15 NOTE — TELEPHONE ENCOUNTER
No care due was identified.  [facility] Embedded Care Due Messages. Reference number: [id number].   11/15/2024 3:15:57 PM CST

## 2024-11-15 NOTE — TELEPHONE ENCOUNTER
----- Message from Guillaume sent at 11/15/2024  3:08 PM CST -----  Contact: Pt  [phone]  Requesting an RX refill or new RX.    Is this a refill or new RX: refill    RX name and strength (copy/paste from chart):  HYDROcodone-acetaminophen (NORCO) 5-325 mg per tablet    Is this a 30 day or 90 day RX:     Pharmacy name and phone # (copy/paste from chart):  [facility]   Phone: [phone]  Fax: [phone]      The doctors have asked that we provide their patients with the following 2 reminders -- prescription refills can take up to 72 hours, and a friendly reminder that in the future you can use your [facility] account to request refills: yes

## 2024-11-15 NOTE — TELEPHONE ENCOUNTER
----- Message from Guillaume sent at 11/15/2024  3:08 PM CST -----  Contact: Pt  [phone]  Requesting an RX refill or new RX.    Is this a refill or new RX: refill    RX name and strength (copy/paste from chart):  HYDROcodone-acetaminophen (NORCO) 5-325 mg per tablet    Is this a 30 day or 90 day RX:     Pharmacy name and phone # (copy/paste from chart):  [facility]   Phone: [phone]  Fax: [phone]      The doctors have asked that we provide their patients with the following 2 reminders -- prescription refills can take up to 72 hours, and a friendly reminder that in the future you can use your [facility] account to request refills: yes

## 2024-11-17 RX ORDER — HYDROCODONE BITARTRATE AND ACETAMINOPHEN 5; 325 MG/1; MG/1
1 TABLET ORAL EVERY 6 HOURS PRN
Qty: 12 TABLET | Refills: 0 | OUTPATIENT
Start: 2024-11-17

## 2024-11-19 DIAGNOSIS — M10.9 ACUTE GOUT INVOLVING TOE OF RIGHT FOOT, UNSPECIFIED CAUSE: ICD-10-CM

## 2024-11-19 RX ORDER — HYDROCODONE BITARTRATE AND ACETAMINOPHEN 5; 325 MG/1; MG/1
1 TABLET ORAL EVERY 6 HOURS PRN
Qty: 12 TABLET | Refills: 0 | Status: CANCELLED | OUTPATIENT
Start: 2024-11-19

## 2024-11-19 NOTE — TELEPHONE ENCOUNTER
No care due was identified.  [facility] Embedded Care Due Messages. Reference number: [id number].   11/19/2024 11:42:21 AM CST

## 2024-11-19 NOTE — TELEPHONE ENCOUNTER
----- Message from Guillaume sent at 11/19/2024 10:20 AM CST -----  Contact: Pt  [phone]  Pt called in regards to the request to refill the pain medication pt states he is still waiting to get the medication fill if Dr Burgos will not refill it please call and let him know pt states he is pain please advise.

## 2024-11-20 ENCOUNTER — TELEPHONE (OUTPATIENT)
Dept: ORTHOPEDICS | Facility: CLINIC | Age: 59
End: 2024-11-20
Payer: COMMERCIAL

## 2024-11-20 DIAGNOSIS — R52 PAIN: Primary | ICD-10-CM

## 2024-11-20 NOTE — TELEPHONE ENCOUNTER
Received a call from patient stating he has left several message for medication refills. Spoke with Dr Burgos who stated that she will review the message and if patient is I extreme pain he should go to the ED for evaluation. Patient scheduled an appointment tomorrow with a different provider per patient's request.

## 2024-11-21 ENCOUNTER — OFFICE VISIT (OUTPATIENT)
Dept: INTERNAL MEDICINE | Facility: CLINIC | Age: 59
End: 2024-11-21
Payer: COMMERCIAL

## 2024-11-21 ENCOUNTER — HOSPITAL ENCOUNTER (OUTPATIENT)
Dept: RADIOLOGY | Facility: HOSPITAL | Age: 59
Discharge: HOME | End: 2024-11-21
Attending: INTERNAL MEDICINE
Payer: COMMERCIAL

## 2024-11-21 VITALS
SYSTOLIC BLOOD PRESSURE: 106 MMHG | OXYGEN SATURATION: 96 % | HEIGHT: 70 IN | BODY MASS INDEX: 28.12 KG/M2 | HEART RATE: 66 BPM | WEIGHT: 196.44 LBS | DIASTOLIC BLOOD PRESSURE: 66 MMHG

## 2024-11-21 DIAGNOSIS — R07.9 CHEST PAIN, UNSPECIFIED TYPE: ICD-10-CM

## 2024-11-21 DIAGNOSIS — R63.5 WEIGHT GAIN: ICD-10-CM

## 2024-11-21 DIAGNOSIS — I25.10 CORONARY ARTERY DISEASE INVOLVING NATIVE CORONARY ARTERY OF NATIVE HEART WITHOUT ANGINA PECTORIS: ICD-10-CM

## 2024-11-21 DIAGNOSIS — M79.642 LEFT HAND PAIN: ICD-10-CM

## 2024-11-21 DIAGNOSIS — M79.671 FOOT PAIN, RIGHT: Primary | ICD-10-CM

## 2024-11-21 PROCEDURE — 1160F RVW MEDS BY RX/DR IN RCRD: CPT | Mod: CPTII,S$GLB,,

## 2024-11-21 PROCEDURE — 3044F HG A1C LEVEL LT 7.0%: CPT | Mod: CPTII,S$GLB,,

## 2024-11-21 PROCEDURE — 99999 PR PBB SHADOW E&M-EST. PATIENT-LVL IV: CPT | Mod: PBBFAC,,,

## 2024-11-21 PROCEDURE — 73130 X-RAY EXAM OF HAND: CPT | Mod: 26,LT,, | Performed by: RADIOLOGY

## 2024-11-21 PROCEDURE — 4010F ACE/ARB THERAPY RXD/TAKEN: CPT | Mod: CPTII,S$GLB,,

## 2024-11-21 PROCEDURE — 3078F DIAST BP <80 MM HG: CPT | Mod: CPTII,S$GLB,,

## 2024-11-21 PROCEDURE — 3008F BODY MASS INDEX DOCD: CPT | Mod: CPTII,S$GLB,,

## 2024-11-21 PROCEDURE — 71046 X-RAY EXAM CHEST 2 VIEWS: CPT | Mod: TC

## 2024-11-21 PROCEDURE — 71046 X-RAY EXAM CHEST 2 VIEWS: CPT | Mod: 26,,, | Performed by: RADIOLOGY

## 2024-11-21 PROCEDURE — 99213 OFFICE O/P EST LOW 20 MIN: CPT | Mod: S$GLB,,,

## 2024-11-21 PROCEDURE — 1159F MED LIST DOCD IN RCRD: CPT | Mod: CPTII,S$GLB,,

## 2024-11-21 PROCEDURE — 73130 X-RAY EXAM OF HAND: CPT | Mod: TC,LT

## 2024-11-21 PROCEDURE — 3074F SYST BP LT 130 MM HG: CPT | Mod: CPTII,S$GLB,,

## 2024-11-21 RX ORDER — DICLOFENAC SODIUM 75 MG/1
75 TABLET, DELAYED RELEASE ORAL 2 TIMES DAILY
Qty: 14 TABLET | Refills: 0 | Status: SHIPPED | OUTPATIENT
Start: 2024-11-21

## 2024-11-21 NOTE — PATIENT INSTRUCTIONS
Nice meeting you today,    We are going to get some lab work to check for diabetes and to check for gout. We also wrote a prescription for diclofenac.

## 2024-11-21 NOTE — PROGRESS NOTES
Subjective     Patient ID: Irwin Richard is a 59 y.o. male.    Chief Complaint: Foot and hand pain and weight gain    Mr. Richard is a 59 M with a past medical heisoty that includes CABG, TN, HLD, CAD, HF, gout, and sciatica. He is here today with concerns for a pain in his right great toe extending to the bottom of his foot and left hand pain, weight gain, and episodes of SOB. He states the pain onset about four days ago. He is taking both colchicine and allopurinol and neither are helping. He had imaging today of his hand and nothing acute was seen. Pain in both his hand and feet are worse with movement. The pain in his hand is located over the base of the thumb and around the 5th MCP. There is no excessive warmth over either the right great toe or along the joint of the hand. Patient denies smoking since 2017, states he only eats red meat once a week, states he drinks one beer every 2 days or so. He has tried naproxen at home without adequate relief. He also has concerns for sporadic SOB and weight gain. He states the SOB comes on sporadically with activity or at rest and lasts only a few seconds. He is not having any leg swelling. CXR done today showed no cardiomegaly or signs of volume overload. BNP was mildly elevated at 101. He is unsure what brings on the SOB. When asked if he is SOB lying down he says sometimes, but not consistently. His last concern is weight gain. He reports a 10 pound weight gain over the last few months; he admits to increasing abdominal weight gain.     Review of Systems   Constitutional:  Negative for chills, fatigue and fever.   Respiratory:  Positive for shortness of breath (sporadic; lasting a few seconds). Negative for cough.    Cardiovascular:  Negative for chest pain and leg swelling.   Gastrointestinal:  Negative for abdominal pain.   Genitourinary:  Negative for scrotal swelling.        Nocturia   Musculoskeletal:  Positive for arthralgias (right great toe). Negative for  joint swelling.          Objective     Physical Exam  Constitutional:       General: He is not in acute distress.     Appearance: Normal appearance. He is not ill-appearing, toxic-appearing or diaphoretic.   Eyes:      General: No scleral icterus.  Cardiovascular:      Rate and Rhythm: Normal rate and regular rhythm.      Pulses: Normal pulses.      Heart sounds: Normal heart sounds. No murmur heard.  Pulmonary:      Effort: Pulmonary effort is normal. No respiratory distress.      Breath sounds: Normal breath sounds. No stridor. No wheezing, rhonchi or rales.   Abdominal:      General: Abdomen is flat. Bowel sounds are normal. There is no distension.      Palpations: Abdomen is soft.      Tenderness: There is no abdominal tenderness.   Musculoskeletal:      Right lower leg: No edema.      Left lower leg: No edema.   Neurological:      Mental Status: He is alert and oriented to person, place, and time.            Assessment and Plan     1. Foot pain, right  Right foot pain along the great toe (tip to just past the MTP); worse with movement. No erythema or warmth.    Ddx: tendonitis, gout. Will recommend diclofenac; if uric acid level is significantly elevated will consider a steroid course.     -     URIC ACID; Future; Expected date: 11/21/2024  -     Sedimentation rate; Future; Expected date: 11/21/2024  -     diclofenac (VOLTAREN) 75 MG EC tablet; Take 1 tablet (75 mg total) by mouth 2 (two) times daily.  Dispense: 14 tablet; Refill: 0  -     CBC W/ AUTO DIFFERENTIAL; Future; Expected date: 11/21/2024  -     HEMOGLOBIN A1C; Future; Expected date: 11/21/2024    2. Weight gain  Recommended tracking calories (showed patient the [employer] [place] queta). Pre-diabetic on his last labs.  -     HEMOGLOBIN A1C; Future; Expected date: 11/21/2024    3. Left hand pain  Pain in the base of the left thumb and the 5th MCP joint of the left hand with movement. No warmth or erythema noted.       4. Shortness of Breath  Patient reports  random episodes of SOB at rest or with activity. Episodes last just a few seconds and disappear. No other symptoms.     Recommend patient keep a log of when the occur, how long they last, and what he was doing at the time.            Follow up if symptoms worsen or fail to improve.

## 2024-11-21 NOTE — PROGRESS NOTES
Reviewed documentation of history, physical, assessment, and plan.  The documentation of the history and physical agrees with the residents presentation.  The assessment and plan reflects my discussion with the resident about the care and management of this patient.  In addition the patient was seen and examined and there was no obvious evidence for gout.  The history of shortness a breath does not suggest an acute cardiac cause such as an arrhythmia.  In addition to the resident I did discuss with the patient management of the concerns he presented with.

## 2024-11-25 ENCOUNTER — ANESTHESIA EVENT (OUTPATIENT)
Dept: ENDOSCOPY | Facility: HOSPITAL | Age: 59
End: 2024-11-25
Payer: COMMERCIAL

## 2024-11-25 NOTE — ANESTHESIA PREPROCEDURE EVALUATION
11/25/2024  Irwin Richard is a 59 y.o., male.  [facility]  Anesthesia Pre-Operative Evaluation       Patient Name: Irwin Richard  YOB: 1965  MRN: [id number]  [facility]: [id number]      Code Status: Prior   Date of Procedure: 12/2/2024  Anesthesia: Choice Procedure: Procedure(s) (LRB):  COLONOSCOPY, SCREENING, LOW RISK PATIENT (N/A)  Pre-Operative Diagnosis: Colon cancer screening [Z12.11]  Proceduralist: Surgeons and Role:     * Marlyn Kraft MD - Primary        SUBJECTIVE:   Irwin Richard is a 59 y.o. male who  has a past medical history of Coronary artery disease, COVID-19, Gout, Hyperlipidemia, and Hypertension..     he has a current medication list which includes the following long-term medication(s): aspirin, atorvastatin, colchicine, hydrocodone-acetaminophen, losartan, metoprolol succinate, and nitroglycerin.     ALLERGIES:   Review of patient's allergies indicates:  No Known Allergies  LDA:          Lines/Drains/Airways       None                  Anesthesia Evaluation      Airway   Mallampati: II  TM distance: Normal  Neck ROM: Normal ROM  Dental      Pulmonary    Cardiovascular   (+) hypertension, CAD, CHF    Rate: Normal    Neuro/Psych      GI/Hepatic/Renal      Endo/Other    Abdominal                   MEDICATIONS:     Antibiotics (From admission, onward)      None          VTE Risk Mitigation (From admission, onward)      None              Current Facility-Administered Medications   Medication Dose Route Frequency Provider Last Rate Last Admin    acetaminophen tablet 650 mg  650 mg Oral Once PRN Cristina Hammond MD         Current Outpatient Medications   Medication Sig Dispense Refill    allopurinoL (ZYLOPRIM) 100 MG tablet Take 1 tablet (100 mg total) by mouth once daily. 30 tablet 5    aspirin 81 MG Chew Take 1 tablet (81 mg total) by mouth once  daily. 90 tablet 0    atorvastatin (LIPITOR) 80 MG tablet Take 1 tablet (80 mg total) by mouth once daily. 90 tablet 3    colchicine (COLCRYS) 0.6 mg tablet Take 1 tablet by mouth daily as needed at first sign of gout flare (Patient not taking: Reported on 11/21/2024) 10 tablet 0    diclofenac (VOLTAREN) 75 MG EC tablet Take 1 tablet (75 mg total) by mouth 2 (two) times daily. 14 tablet 0    HYDROcodone-acetaminophen (NORCO) 5-325 mg per tablet Take 1 tablet by mouth every 6 (six) hours as needed for Pain. 12 tablet 0    losartan (COZAAR) 25 MG tablet Take 1 tablet (25 mg total) by mouth once daily. 90 tablet 1    metoprolol succinate (TOPROL-XL) 100 MG 24 hr tablet Take 1 tablet (100 mg total) by mouth once daily. 90 tablet 3    nitroGLYCERIN (NITROSTAT) 0.4 MG SL tablet Place 1 tablet (0.4 mg total) under the tongue every 5 (five) minutes as needed for Chest pain. 15 tablet 0          History:   There are no hospital problems to display for this patient.    Surgical History:    has a past surgical history that includes Coronary artery bypass graft and Coronary angiography including bypass grafts with catheterization of left heart (N/A, 9/8/2022).   Social History:    reports being sexually active and has had partner(s) who are female. He reports using the following method of birth control/protection: None.  reports that he has quit smoking. He has never used smokeless tobacco. He reports that he does not currently use alcohol. He reports that he does not use drugs.     OBJECTIVE:     Vital Signs (Most Recent):    Vital Signs Range (Last 24H):          There is no height or weight on file to calculate BMI.   Wt Readings from Last 4 Encounters:   11/21/24 89.1 kg (196 lb 6.9 oz)   11/13/24 88.5 kg (195 lb 1.7 oz)   11/06/24 86.2 kg (190 lb)   10/15/24 83.9 kg (185 lb)       Significant Labs:  Lab Results   Component Value Date    WBC 6.18 08/08/2024    HGB 13.4 (L) 08/08/2024    HCT 41.3 08/08/2024      08/08/2024     11/06/2024    K 4.2 11/06/2024     11/06/2024    CREATININE 1.0 11/06/2024    BUN 8 11/06/2024    CO2 29 11/06/2024    GLU 87 11/06/2024    CALCIUM 9.4 11/06/2024    MG 2.1 07/29/2022    PHOS 3.5 07/29/2022    ALKPHOS 90 11/06/2024    ALT 18 11/06/2024    AST 16 11/06/2024    ALBUMIN 4.5 11/06/2024    HGBA1C 5.9 (H) 08/08/2024    TROPONINI 0.020 03/25/2023     (H) 11/06/2024     No LMP for male patient.  No results found for this or any previous visit (from the past 72 hours).    EKG:   Results for orders placed or performed in visit on 11/06/24   IN OFFICE EKG 12-LEAD (to [place])    Collection Time: 11/06/24  1:07 PM   Result Value Ref Range    QRS Duration 76 ms    [facility] QTC Calculation 402 ms    Narrative    Test Reason : Z01.818,    Vent. Rate :  57 BPM     Atrial Rate :  57 BPM     P-R Int : 192 ms          QRS Dur :  76 ms      QT Int : 414 ms       P-R-T Axes :  29 -15  57 degrees    QTcB Int : 402 ms    Sinus bradycardia  Otherwise normal ECG  When compared with ECG of 25-MAR-2023 17:59,  No significant change was found  Confirmed by Karsten Sumner ([id number]) on 11/17/2024 8:15:05 PM    Referred By:            Confirmed By: Karsten Sumner       TTE:  Results for orders placed or performed during the hospital encounter of 07/28/22   Echo   Result Value Ref Range    Ascending aorta 2.55 cm    STJ 2.51 cm    AV mean gradient 2 mmHg    Ao peak lv 0.97 m/s    Ao VTI 16.47 cm    IVRT 114.18 msec    IVS 1.00 0.6 - 1.1 cm    LA size 3.26 cm    Left Atrium Major Axis 4.23 cm    Left Atrium Minor Axis 3.91 cm    LVIDd 4.27 3.5 - 6.0 cm    LVIDs 3.20 2.1 - 4.0 cm    LVOT diameter 2.10 cm    LVOT peak VTI 14.32 cm    PW 0.95 0.6 - 1.1 cm    MV Peak A Lv 0.66 m/s    E wave deceleration time 301.53 msec    MV Peak E Lv 0.33 m/s    PV Peak D Lv 0.43 m/s    PV Peak S Lv 0.35 m/s    RA Major Axis 3.35 cm    RA Width 2.17 cm    RVDD 2.62 cm    Sinus 2.62 cm    TAPSE 1.99 cm    TR Max Lv 2.28 m/s  "   LA WIDTH 2.82 cm    Ao root annulus 2.68 cm    AORTIC VALVE CUSP SEPERATION 2.08 cm    PV PEAK VELOCITY 0.66 cm/s    MV stenosis pressure 1/2 time 87.44 ms    LV Diastolic Volume 81.82 mL    LV Systolic Volume 40.96 mL    LVOT peak olivier 0.79 m/s    TDI LATERAL 0.09 m/s    TDI SEPTAL 0.06 m/s    Mr max olivier 0.03 m/s    LA Vol (MOD) 28.09 cm3    MV "A" wave duration 10.28 msec    MV mean gradient 0 mmHg    MV peak gradient 3 mmHg    RV S' 8.80 cm/s    MV VTI 20.93 cm    LV LATERAL E/E' RATIO 3.67 m/s    LV SEPTAL E/E' RATIO 5.50 m/s    FS 25 %    LA Vol 31.75 cm3    LV mass 136.04 g    Left Ventricle Relative Wall Thickness 0.44 cm    AV valve area 3.01 cm2    AV Velocity Ratio 0.81     AV index (prosthetic) 0.87     MV valve area p 1/2 method 2.52 cm2    MV valve area by continuity eq 2.37 cm2    E/A ratio 0.50     Mean e' 0.08 m/s    Pulm vein S/D ratio 0.81     LVOT area 3.5 cm2    LVOT stroke volume 49.57 cm3    AV peak gradient 4 mmHg    E/E' ratio 4.40 m/s    Triscuspid Valve Regurgitation Peak Gradient 21 mmHg    BSA 2.01 m2    LV Systolic Volume Index 20.5 mL/m2    LV Diastolic Volume Index 40.91 mL/m2    WOOD 15.9 mL/m2    LV Mass Index 68 g/m2    WOOD (MOD) 14.0 mL/m2    Right Atrial Pressure (from IVC) 3 mmHg    EF 50 %    TV resting pulmonary artery pressure 24 mmHg    Narrative    · The left ventricle is normal in size with concentric remodeling and low   normal systolic function.  · The estimated ejection fraction is 50%.  · Normal left ventricular diastolic function.  · There is mild left ventricular global hypokinesis.  · Normal right ventricular size with normal right ventricular systolic   function.  · Normal central venous pressure (3 mmHg).  · The estimated PA systolic pressure is 24 mmHg.        EF   Date Value Ref Range Status   07/29/2022 50 % Final      No results found for this or any previous visit.  SOL:  No results found for this or any previous visit.  Stress Test:  Results for orders " "placed during the hospital encounter of 07/28/22    Nuclear Stress Test    Interpretation Summary    The EKG portion of this study is negative for ischemia.    The patient reported no chest pain during the stress test.    During stress, rare PVCs are noted.    The nuclear portion of this study will be reported separately.     LHC:  Results for orders placed during the hospital encounter of 09/08/22    Cardiac catheterization    Conclusion    The pre-procedure left ventricular end diastolic pressure was 8.    The estimated blood loss was <50 mL.    Severe native multivessel coronary artery disease as described    Patent LIMA to LAD, vein graft to OM, vein graft to ramus and vein graft to distal RCA    The procedure log was documented by Documenter: RT Gianni and verified by Andrews Lopez MD.    Date: 9/8/2022  Time: 6:36 PM     PFT:  No results found for: "FEV1", "FVC", "QSI3BPY", "TLC", "DLCO"     ASSESSMENT/PLAN:        Pre-op Assessment    I have reviewed the Patient Summary Reports.     I have reviewed the Nursing Notes. I have reviewed the NPO Status.   I have reviewed the Medications.     Review of Systems  Anesthesia Hx:  No problems with previous Anesthesia             Denies Family Hx of Anesthesia complications.    Denies Personal Hx of Anesthesia complications.                    Social:  Alcohol Use, Former Smoker       Hematology/Oncology:  Hematology Normal   Oncology Normal                                   EENT/Dental:  EENT/Dental Normal           Cardiovascular:     Hypertension   CAD       CHF    hyperlipidemia    CABG                           Pulmonary:  Pulmonary Normal                       Renal/:  Renal/ Normal                 Hepatic/GI:  Hepatic/GI Normal                    Musculoskeletal:  Musculoskeletal Normal                Neurological:  Neurology Normal                Chronic Pain: Chronic lower back pain.                         Endocrine:  Endocrine Normal      "       Dermatological:  Skin Normal    Psych:  Psychiatric Normal                  Physical Exam  General: Well nourished, Cooperative, Alert and Oriented    Airway:  Mallampati: II   TM Distance: Normal  Tongue: Normal  Neck ROM: Normal ROM    Chest/Lungs:  Clear to auscultation    Heart:  Rate: Normal    Abdomen:  Normal      Anesthesia Plan  Type of Anesthesia, risks & benefits discussed:    Anesthesia Type: Gen Natural Airway  Intra-op Monitoring Plan: Standard ASA Monitors  Post Op Pain Control Plan: multimodal analgesia  Induction:  IV  Informed Consent: Informed consent signed with the Patient and all parties understand the risks and agree with anesthesia plan.  All questions answered.   ASA Score: 3  Day of Surgery Review of History & Physical: H&P Update referred to the surgeon/provider.    Ready For Surgery From Anesthesia Perspective.     .

## 2024-11-27 NOTE — H&P
Short Stay Endoscopy History and Physical    PCP - Maggie Burgos MD  Referring Physician - Yessenia Rivas, NP  [address] [place] [address]    Procedure - Colonoscopy  ASA - per anesthesia  Mallampati - per anesthesia  History of Anesthesia problems - no  Family history Anesthesia problems -  no   Plan of anesthesia - General    HPI  59 y.o. male  Reason for procedure:   Colon cancer screening [Z12.11]        ROS:  Constitutional: No fevers, chills, No weight loss  CV: No chest pain  Pulm: No cough, No shortness of breath  GI: see HPI    Medical History:  has a past medical history of Coronary artery disease, COVID-19, Gout, Hyperlipidemia, and Hypertension.    Surgical History:  has a past surgical history that includes Coronary artery bypass graft and Coronary angiography including bypass grafts with catheterization of left heart (N/A, 9/8/2022).    Family History: family history includes Heart disease in his father..    Social History:  reports that he has quit smoking. He has never used smokeless tobacco. He reports that he does not currently use alcohol. He reports that he does not use drugs.    Review of patient's allergies indicates:  No Known Allergies    Medications:   Facility-Administered Medications Prior to Admission   Medication Dose Route Frequency Provider Last Rate Last Admin    acetaminophen tablet 650 mg  650 mg Oral Once PRN Cristina Hammond MD         Medications Prior to Admission   Medication Sig Dispense Refill Last Dose/Taking    allopurinoL (ZYLOPRIM) 100 MG tablet Take 1 tablet (100 mg total) by mouth once daily. 30 tablet 5     aspirin 81 MG Chew Take 1 tablet (81 mg total) by mouth once daily. 90 tablet 0     atorvastatin (LIPITOR) 80 MG tablet Take 1 tablet (80 mg total) by mouth once daily. 90 tablet 3     colchicine (COLCRYS) 0.6 mg tablet Take 1 tablet by mouth daily as needed at first sign of gout flare (Patient not taking: Reported on 11/21/2024) 10  tablet 0     diclofenac (VOLTAREN) 75 MG EC tablet Take 1 tablet (75 mg total) by mouth 2 (two) times daily. 14 tablet 0     HYDROcodone-acetaminophen (NORCO) 5-325 mg per tablet Take 1 tablet by mouth every 6 (six) hours as needed for Pain. 12 tablet 0     losartan (COZAAR) 25 MG tablet Take 1 tablet (25 mg total) by mouth once daily. 90 tablet 1     metoprolol succinate (TOPROL-XL) 100 MG 24 hr tablet Take 1 tablet (100 mg total) by mouth once daily. 90 tablet 3     nitroGLYCERIN (NITROSTAT) 0.4 MG SL tablet Place 1 tablet (0.4 mg total) under the tongue every 5 (five) minutes as needed for Chest pain. 15 tablet 0        Physical Exam:    Vital Signs: There were no vitals filed for this visit.    General Appearance: Well appearing in no acute distress  Abdomen: Soft, non tender, non distended with normal bowel sounds, no masses    Labs:  Lab Results   Component Value Date    WBC 6.18 08/08/2024    HGB 13.4 (L) 08/08/2024    HCT 41.3 08/08/2024     08/08/2024    CHOL 114 (L) 08/08/2024    CHOL 114 (L) 08/08/2024    TRIG 56 08/08/2024    TRIG 56 08/08/2024    HDL 37 (L) 08/08/2024    HDL 37 (L) 08/08/2024    ALT 18 11/06/2024    AST 16 11/06/2024     11/06/2024    K 4.2 11/06/2024     11/06/2024    CREATININE 1.0 11/06/2024    BUN 8 11/06/2024    CO2 29 11/06/2024    TSH 2.154 11/06/2024    PSA 0.98 08/08/2024    HGBA1C 5.9 (H) 08/08/2024       I have explained the risks and benefits of this endoscopic procedure to the patient including but not limited to bleeding, inflammation, infection, perforation, and death.    Assessment/Plan:     CRC Screening     - Proceed with colonoscopy     Marlyn Kraft MD  Gastroenterology   [facility]

## 2024-12-02 ENCOUNTER — HOSPITAL ENCOUNTER (OUTPATIENT)
Facility: HOSPITAL | Age: 59
Discharge: HOME | End: 2024-12-02
Attending: STUDENT IN AN ORGANIZED HEALTH CARE EDUCATION/TRAINING PROGRAM | Admitting: STUDENT IN AN ORGANIZED HEALTH CARE EDUCATION/TRAINING PROGRAM
Payer: COMMERCIAL

## 2024-12-02 ENCOUNTER — ANESTHESIA (OUTPATIENT)
Dept: ENDOSCOPY | Facility: HOSPITAL | Age: 59
End: 2024-12-02
Payer: COMMERCIAL

## 2024-12-02 VITALS
OXYGEN SATURATION: 99 % | HEART RATE: 68 BPM | HEIGHT: 70 IN | SYSTOLIC BLOOD PRESSURE: 133 MMHG | TEMPERATURE: 98 F | DIASTOLIC BLOOD PRESSURE: 88 MMHG | RESPIRATION RATE: 19 BRPM | BODY MASS INDEX: 28.18 KG/M2

## 2024-12-02 DIAGNOSIS — Z12.11 COLON CANCER SCREENING: Primary | ICD-10-CM

## 2024-12-02 PROCEDURE — 27201089 HC SNARE, DISP (ANY): Performed by: STUDENT IN AN ORGANIZED HEALTH CARE EDUCATION/TRAINING PROGRAM

## 2024-12-02 PROCEDURE — 37000009 HC ANESTHESIA EA ADD 15 MINS: Performed by: STUDENT IN AN ORGANIZED HEALTH CARE EDUCATION/TRAINING PROGRAM

## 2024-12-02 PROCEDURE — 25000003 PHARM REV CODE 250: Performed by: NURSE ANESTHETIST, CERTIFIED REGISTERED

## 2024-12-02 PROCEDURE — 45385 COLONOSCOPY W/LESION REMOVAL: CPT | Mod: 33,,, | Performed by: STUDENT IN AN ORGANIZED HEALTH CARE EDUCATION/TRAINING PROGRAM

## 2024-12-02 PROCEDURE — 45385 COLONOSCOPY W/LESION REMOVAL: CPT | Mod: 33 | Performed by: STUDENT IN AN ORGANIZED HEALTH CARE EDUCATION/TRAINING PROGRAM

## 2024-12-02 PROCEDURE — 94761 N-INVAS EAR/PLS OXIMETRY MLT: CPT

## 2024-12-02 PROCEDURE — D9220A PRA ANESTHESIA: Mod: ,,, | Performed by: NURSE ANESTHETIST, CERTIFIED REGISTERED

## 2024-12-02 PROCEDURE — 99900035 HC TECH TIME PER 15 MIN (STAT)

## 2024-12-02 PROCEDURE — 88305 TISSUE EXAM BY PATHOLOGIST: CPT | Performed by: PATHOLOGY

## 2024-12-02 PROCEDURE — 63600175 PHARM REV CODE 636 W HCPCS: Performed by: NURSE ANESTHETIST, CERTIFIED REGISTERED

## 2024-12-02 PROCEDURE — 37000008 HC ANESTHESIA 1ST 15 MINUTES: Performed by: STUDENT IN AN ORGANIZED HEALTH CARE EDUCATION/TRAINING PROGRAM

## 2024-12-02 PROCEDURE — A4216 STERILE WATER/SALINE, 10 ML: HCPCS | Performed by: NURSE ANESTHETIST, CERTIFIED REGISTERED

## 2024-12-02 RX ORDER — PROPOFOL 10 MG/ML
VIAL (ML) INTRAVENOUS
Status: DISCONTINUED | OUTPATIENT
Start: 2024-12-02 | End: 2024-12-02

## 2024-12-02 RX ORDER — PROPOFOL 10 MG/ML
VIAL (ML) INTRAVENOUS CONTINUOUS PRN
Status: DISCONTINUED | OUTPATIENT
Start: 2024-12-02 | End: 2024-12-02

## 2024-12-02 RX ORDER — LIDOCAINE HYDROCHLORIDE 20 MG/ML
INJECTION INTRAVENOUS
Status: DISCONTINUED | OUTPATIENT
Start: 2024-12-02 | End: 2024-12-02

## 2024-12-02 RX ORDER — SODIUM CHLORIDE 0.9 % (FLUSH) 0.9 %
SYRINGE (ML) INJECTION
Status: DISCONTINUED | OUTPATIENT
Start: 2024-12-02 | End: 2024-12-02

## 2024-12-02 RX ADMIN — Medication 10 ML: at 08:12

## 2024-12-02 RX ADMIN — GLYCOPYRROLATE 0.1 MG: 0.2 INJECTION, SOLUTION INTRAMUSCULAR; INTRAVENOUS at 08:12

## 2024-12-02 RX ADMIN — PROPOFOL 150 MCG/KG/MIN: 10 INJECTION, EMULSION INTRAVENOUS at 08:12

## 2024-12-02 RX ADMIN — PROPOFOL 100 MG: 10 INJECTION, EMULSION INTRAVENOUS at 08:12

## 2024-12-02 RX ADMIN — LIDOCAINE HYDROCHLORIDE 100 MG: 20 INJECTION INTRAVENOUS at 08:12

## 2024-12-02 NOTE — PROVATION PATIENT INSTRUCTIONS
Discharge Summary/Instructions after an Endoscopic Procedure  Patient Name: Irwin Richard  Patient MRN: [id number]  Patient YOB: 1965 Monday, December 2, 2024  Marlyn Kraft MD  Dear patient,  As a result of recent federal legislation (The Federal Cures Act), you may   receive lab or pathology results from your procedure in your [facility]   account before your physician is able to contact you. Your physician or   their representative will relay the results to you with their   recommendations at their soonest availability.  Thank you,  RESTRICTIONS:  During your procedure today, you received medications for sedation.  These   medications may affect your judgment, balance and coordination.  Therefore,   for 24 hours, you have the following restrictions:   - DO NOT drive a car, operate machinery, make legal/financial decisions,   sign important papers or drink alcohol.    ACTIVITY:  Today: no heavy lifting, straining or running due to procedural   sedation/anesthesia.  The following day: return to full activity including work.  DIET:  Eat and drink normally unless instructed otherwise.     TREATMENT FOR COMMON SIDE EFFECTS:  - Mild abdominal pain, nausea, belching, bloating or excessive gas:  rest,   eat lightly and use a heating pad.  - Sore Throat: treat with throat lozenges and/or gargle with warm salt   water.  - Because air was used during the procedure, expelling large amounts of air   from your rectum or belching is normal.  - If a bowel prep was taken, you may not have a bowel movement for 1-3 days.    This is normal.  SYMPTOMS TO WATCH FOR AND REPORT TO YOUR PHYSICIAN:  1. Abdominal pain or bloating, other than gas cramps.  2. Chest pain.  3. Back pain.  4. Signs of infection such as: chills or fever occurring within 24 hours   after the procedure.  5. Rectal bleeding, which would show as bright red, maroon, or black stools.   (A tablespoon of blood from the rectum is not serious, especially if    hemorrhoids are present.)  6. Vomiting.  7. Weakness or dizziness.  GO DIRECTLY TO THE NEAREST EMERGENCY ROOM IF YOU HAVE ANY OF THE FOLLOWING:      Difficulty breathing              Chills and/or fever over 101 F   Persistent vomiting and/or vomiting blood   Severe abdominal pain   Severe chest pain   Black, tarry stools   Bleeding- more than one tablespoon   Any other symptom or condition that you feel may need urgent attention  Your doctor recommends these additional instructions:  If any biopsies were taken, your doctors clinic will contact you in 1 to 2   weeks with any results.  - Discharge patient to home (ambulatory).   - Resume regular diet.   - Continue present medications.   - Await pathology results.   - Repeat colonoscopy in 7 years for surveillance.  For questions, problems or results please call your physician - Marlyn Kraft MD at Work:  [phone].  [facility], EMERGENCY ROOM PHONE NUMBER: [phone]  IF A COMPLICATION OR EMERGENCY SITUATION ARISES AND YOU ARE UNABLE TO REACH   YOUR PHYSICIAN - GO DIRECTLY TO THE EMERGENCY ROOM.  Marlyn Kraft MD  12/2/2024 8:49:40 AM  This report has been verified and signed electronically.  Dear patient,  As a result of recent federal legislation (The Federal Cures Act), you may   receive lab or pathology results from your procedure in your [facility]   account before your physician is able to contact you. Your physician or   their representative will relay the results to you with their   recommendations at their soonest availability.  Thank you,  PROVATION

## 2024-12-02 NOTE — PLAN OF CARE
Pt in preop bay 5, VSS, and IV inserted. Pt denies any open wounds on body or the use of any weight loss injections. Pt needs procedural consents signed, anesthesia consents signed, otherwise ready to roll.

## 2024-12-02 NOTE — ANESTHESIA POSTPROCEDURE EVALUATION
Anesthesia Post Evaluation    Patient: Irwin Richard    Procedure(s) Performed: Procedure(s) (LRB):  COLONOSCOPY, SCREENING, LOW RISK PATIENT (N/A)    Final Anesthesia Type: general      Patient location during evaluation: PACU  Patient participation: Yes- Able to Participate  Level of consciousness: awake and alert  Post-procedure vital signs: reviewed and stable  Pain management: adequate  Airway patency: patent    PONV status at discharge: No PONV  Anesthetic complications: no      Cardiovascular status: stable  Respiratory status: spontaneous ventilation  Hydration status: euvolemic  Follow-up not needed.          Vitals Value Taken Time   /88 12/02/24 0913   Temp 36.6 °C (97.8 °F) 12/02/24 0849   Pulse 68 12/02/24 0913   Resp 19 12/02/24 0900   SpO2 99 % 12/02/24 0913         Event Time   Out of Recovery 09:14:00         Pain/Caitie Score: Caitie Score: 9 (12/2/2024  8:59 AM)

## 2024-12-02 NOTE — TRANSFER OF CARE
"Anesthesia Transfer of Care Note    Patient: Irwin Richard    Procedure(s) Performed: Procedure(s) (LRB):  COLONOSCOPY, SCREENING, LOW RISK PATIENT (N/A)    Patient location: PACU    Anesthesia Type: general    Transport from OR: Transported from OR on room air with adequate spontaneous ventilation    Post pain: adequate analgesia    Post assessment: no apparent anesthetic complications and tolerated procedure well    Post vital signs: stable    Level of consciousness: responds to stimulation and sedated    Nausea/Vomiting: no nausea/vomiting    Complications: none    Transfer of care protocol was followed      Last vitals: Visit Vitals  /86 (BP Location: Right arm, Patient Position: Lying)   Pulse 63   Temp 36.6 °C (97.9 °F) (Temporal)   Resp 16   Ht 5' 10" (1.778 m)   SpO2 99%   BMI 28.18 kg/m²     "

## 2024-12-04 ENCOUNTER — PATIENT MESSAGE (OUTPATIENT)
Dept: GASTROENTEROLOGY | Facility: CLINIC | Age: 59
End: 2024-12-04
Payer: COMMERCIAL

## 2024-12-04 LAB
FINAL PATHOLOGIC DIAGNOSIS: NORMAL
GROSS: NORMAL
Lab: NORMAL

## 2024-12-04 RX ORDER — METOPROLOL SUCCINATE 100 MG/1
100 TABLET, EXTENDED RELEASE ORAL
Qty: 90 TABLET | Refills: 3 | Status: SHIPPED | OUTPATIENT
Start: 2024-12-04

## 2024-12-11 PROBLEM — Z95.1 S/P CABG X 4: Status: ACTIVE | Noted: 2024-12-11

## 2024-12-11 PROBLEM — I25.10 CORONARY ARTERY DISEASE INVOLVING NATIVE CORONARY ARTERY OF NATIVE HEART WITHOUT ANGINA PECTORIS: Status: ACTIVE | Noted: 2020-01-15

## 2025-02-05 DIAGNOSIS — I10 ESSENTIAL HYPERTENSION: ICD-10-CM

## 2025-02-05 RX ORDER — METOPROLOL SUCCINATE 100 MG/1
100 TABLET, EXTENDED RELEASE ORAL DAILY
Qty: 90 TABLET | Refills: 3 | Status: SHIPPED | OUTPATIENT
Start: 2025-02-05

## 2025-02-05 RX ORDER — LOSARTAN POTASSIUM 25 MG/1
25 TABLET ORAL DAILY
Qty: 90 TABLET | Refills: 1 | Status: SHIPPED | OUTPATIENT
Start: 2025-02-05 | End: 2026-02-06

## 2025-08-03 DIAGNOSIS — I10 ESSENTIAL HYPERTENSION: ICD-10-CM

## 2025-08-03 RX ORDER — LOSARTAN POTASSIUM 25 MG/1
25 TABLET ORAL DAILY
Qty: 90 TABLET | Refills: 1 | Status: CANCELLED | OUTPATIENT
Start: 2025-08-03 | End: 2026-08-04

## 2025-08-04 DIAGNOSIS — I10 ESSENTIAL HYPERTENSION: ICD-10-CM

## 2025-08-04 NOTE — TELEPHONE ENCOUNTER
Care Due:                  Date            Visit Type   Department     Provider  --------------------------------------------------------------------------------                                NP -                              PRIMARY      [facility] INTER[facility]  Last Visit: 11-      CARE ([facility])   MEDICINE       Maggie Burgos  Next Visit: None Scheduled  None         None Found                                                            Last  Test          Frequency    Reason                     Performed    Due Date  --------------------------------------------------------------------------------    CBC.........  12 months..  allopurinoL..............  08- 08-    CMP.........  12 months..  allopurinoL..............  11- 11-    Uric Acid...  12 months..  allopurinoL..............  08- 08-    [employer] Embedded Care Due Messages. Reference number: [id number].   8/04/2025 4:45:15 PM CDT

## 2025-08-05 RX ORDER — LOSARTAN POTASSIUM 25 MG/1
25 TABLET ORAL DAILY
Qty: 90 TABLET | Refills: 1 | Status: CANCELLED | OUTPATIENT
Start: 2025-08-05

## 2025-08-05 NOTE — TELEPHONE ENCOUNTER
Refill Routing Note   Medication(s) are not appropriate for processing by [facility] for the following reason(s):        No active prescription written by provider    [facility] action(s):  Defer   Requires labs : Yes        Medication Therapy Plan: Last ordered: 6 months ago (2/5/2025) by Yessenia Rivas NP      Appointments  past 12m or future 3m with PCP    Date Provider   Last Visit   11/13/2024 Maggie Burgos MD   Next Visit   Visit date not found Maggie Burgos MD   ED visits in past 90 days: 0        Note composed:12:29 PM 08/05/2025

## 2025-08-06 DIAGNOSIS — I10 ESSENTIAL HYPERTENSION: ICD-10-CM

## 2025-08-07 RX ORDER — LOSARTAN POTASSIUM 25 MG/1
25 TABLET ORAL DAILY
Qty: 90 TABLET | Refills: 0 | Status: SHIPPED | OUTPATIENT
Start: 2025-08-07

## 2025-08-07 NOTE — TELEPHONE ENCOUNTER
Refill Routing Note   Medication(s) are not appropriate for processing by [facility] for the following reason(s):        Non-participating provider    [facility] action(s):  Route               Appointments  past 12m or future 3m with PCP    Date Provider   Last Visit   8/8/2024 Yessenia Rivas NP   Next Visit   Visit date not found Yessenia Rivas NP   ED visits in past 90 days: 0        Note composed:8:49 AM 08/07/2025

## (undated) DEVICE — GUIDEWIRE J TIP EXCHANGE FIXED CORE 260

## (undated) DEVICE — CATHETER DIAGNOSTIC DXTERITY 6F MPA 110

## (undated) DEVICE — CATHETER DIAGNOSTIC DXTERITY 6FR JL 4

## (undated) DEVICE — CATHETER DIAGNOSTIC DXTERITY 6FR JR 4.0

## (undated) DEVICE — CATHETER DIAGNOSTIC DXTERITY 5FR IMA

## (undated) DEVICE — SHEATH INTRODUCER PRECISION ACCESS 6FX10

## (undated) DEVICE — GUIDEWIRE DOUBLE ENDED .035 DIA. 150CML

## (undated) DEVICE — SHEATH PINNACLE 6FRX10CM W/GUIDEWIRE